# Patient Record
Sex: MALE | Race: OTHER | NOT HISPANIC OR LATINO | URBAN - METROPOLITAN AREA
[De-identification: names, ages, dates, MRNs, and addresses within clinical notes are randomized per-mention and may not be internally consistent; named-entity substitution may affect disease eponyms.]

---

## 2017-01-20 ENCOUNTER — INPATIENT (INPATIENT)
Facility: HOSPITAL | Age: 52
LOS: 2 days | Discharge: HOME | DRG: 816 | End: 2017-01-23
Attending: SURGERY | Admitting: SURGERY
Payer: MEDICARE

## 2017-01-20 VITALS
OXYGEN SATURATION: 98 % | TEMPERATURE: 98 F | SYSTOLIC BLOOD PRESSURE: 164 MMHG | RESPIRATION RATE: 20 BRPM | DIASTOLIC BLOOD PRESSURE: 98 MMHG | WEIGHT: 210.1 LBS | HEART RATE: 96 BPM

## 2017-01-20 LAB
ALBUMIN SERPL ELPH-MCNC: 3.5 G/DL — SIGNIFICANT CHANGE UP (ref 3.4–5)
ALP SERPL-CCNC: 127 U/L — HIGH (ref 40–120)
ALT FLD-CCNC: 35 U/L — SIGNIFICANT CHANGE UP (ref 12–42)
ANION GAP SERPL CALC-SCNC: 10 MMOL/L — SIGNIFICANT CHANGE UP (ref 9–16)
APPEARANCE UR: CLEAR — SIGNIFICANT CHANGE UP
AST SERPL-CCNC: 33 U/L — SIGNIFICANT CHANGE UP (ref 15–37)
BASOPHILS NFR BLD AUTO: 0.2 % — SIGNIFICANT CHANGE UP (ref 0–2)
BASOPHILS NFR BLD AUTO: 0.2 % — SIGNIFICANT CHANGE UP (ref 0–2)
BASOPHILS NFR BLD AUTO: 0.7 % — SIGNIFICANT CHANGE UP (ref 0–2)
BILIRUB SERPL-MCNC: 0.4 MG/DL — SIGNIFICANT CHANGE UP (ref 0.2–1.2)
BILIRUB UR-MCNC: NEGATIVE — SIGNIFICANT CHANGE UP
BLD GP AB SCN SERPL QL: NEGATIVE — SIGNIFICANT CHANGE UP
BUN SERPL-MCNC: 15 MG/DL — SIGNIFICANT CHANGE UP (ref 7–23)
CALCIUM SERPL-MCNC: 8.2 MG/DL — LOW (ref 8.5–10.5)
CHLORIDE SERPL-SCNC: 103 MMOL/L — SIGNIFICANT CHANGE UP (ref 96–108)
CK MB CFR SERPL CALC: <1 NG/ML — SIGNIFICANT CHANGE UP (ref 0.5–3.6)
CO2 SERPL-SCNC: 25 MMOL/L — SIGNIFICANT CHANGE UP (ref 22–31)
COLOR SPEC: YELLOW — SIGNIFICANT CHANGE UP
CREAT SERPL-MCNC: 0.64 MG/DL — SIGNIFICANT CHANGE UP (ref 0.5–1.3)
DIFF PNL FLD: NEGATIVE — SIGNIFICANT CHANGE UP
EOSINOPHIL NFR BLD AUTO: 0.1 % — SIGNIFICANT CHANGE UP (ref 0–6)
EOSINOPHIL NFR BLD AUTO: 0.3 % — SIGNIFICANT CHANGE UP (ref 0–6)
EOSINOPHIL NFR BLD AUTO: 2 % — SIGNIFICANT CHANGE UP (ref 0–6)
EXTRA BLUE TOP TUBE: SIGNIFICANT CHANGE UP
EXTRA SST TUBE: SIGNIFICANT CHANGE UP
GLUCOSE SERPL-MCNC: 179 MG/DL — HIGH (ref 70–99)
GLUCOSE UR QL: 100
HCT VFR BLD CALC: 35.2 % — LOW (ref 39–50)
HCT VFR BLD CALC: 35.7 % — LOW (ref 39–50)
HCT VFR BLD CALC: 37.3 % — LOW (ref 39–50)
HCT VFR BLD CALC: 40.7 % — SIGNIFICANT CHANGE UP (ref 39–50)
HETEROPH AB TITR SER AGGL: NEGATIVE — SIGNIFICANT CHANGE UP
HGB BLD-MCNC: 11.7 G/DL — LOW (ref 13–17)
HGB BLD-MCNC: 11.8 G/DL — LOW (ref 13–17)
HGB BLD-MCNC: 12.6 G/DL — LOW (ref 13–17)
HGB BLD-MCNC: 13.8 G/DL — SIGNIFICANT CHANGE UP (ref 13–17)
KETONES UR-MCNC: NEGATIVE — SIGNIFICANT CHANGE UP
LACTATE SERPL-SCNC: 2 MMOL/L — SIGNIFICANT CHANGE UP (ref 0.5–2)
LACTATE SERPL-SCNC: 2.1 MMOL/L — HIGH (ref 0.5–2)
LEUKOCYTE ESTERASE UR-ACNC: NEGATIVE — SIGNIFICANT CHANGE UP
LYMPHOCYTES # BLD AUTO: 16.9 % — SIGNIFICANT CHANGE UP (ref 13–44)
LYMPHOCYTES # BLD AUTO: 27.1 % — SIGNIFICANT CHANGE UP (ref 13–44)
LYMPHOCYTES # BLD AUTO: 8.1 % — LOW (ref 13–44)
MCHC RBC-ENTMCNC: 31.1 PG — SIGNIFICANT CHANGE UP (ref 27–34)
MCHC RBC-ENTMCNC: 31.2 PG — SIGNIFICANT CHANGE UP (ref 27–34)
MCHC RBC-ENTMCNC: 31.4 PG — SIGNIFICANT CHANGE UP (ref 27–34)
MCHC RBC-ENTMCNC: 31.6 PG — SIGNIFICANT CHANGE UP (ref 27–34)
MCHC RBC-ENTMCNC: 33.1 G/DL — SIGNIFICANT CHANGE UP (ref 32–36)
MCHC RBC-ENTMCNC: 33.2 G/DL — SIGNIFICANT CHANGE UP (ref 32–36)
MCHC RBC-ENTMCNC: 33.8 G/DL — SIGNIFICANT CHANGE UP (ref 32–36)
MCHC RBC-ENTMCNC: 33.9 G/DL — SIGNIFICANT CHANGE UP (ref 32–36)
MCV RBC AUTO: 92.5 FL — SIGNIFICANT CHANGE UP (ref 80–100)
MCV RBC AUTO: 93.5 FL — SIGNIFICANT CHANGE UP (ref 80–100)
MCV RBC AUTO: 93.9 FL — SIGNIFICANT CHANGE UP (ref 80–100)
MCV RBC AUTO: 94.2 FL — SIGNIFICANT CHANGE UP (ref 80–100)
MONOCYTES NFR BLD AUTO: 4.5 % — SIGNIFICANT CHANGE UP (ref 2–14)
MONOCYTES NFR BLD AUTO: 7 % — SIGNIFICANT CHANGE UP (ref 2–14)
MONOCYTES NFR BLD AUTO: 8.7 % — SIGNIFICANT CHANGE UP (ref 2–14)
NEUTROPHILS NFR BLD AUTO: 61.5 % — SIGNIFICANT CHANGE UP (ref 43–77)
NEUTROPHILS NFR BLD AUTO: 75.6 % — SIGNIFICANT CHANGE UP (ref 43–77)
NEUTROPHILS NFR BLD AUTO: 87.1 % — HIGH (ref 43–77)
NITRITE UR-MCNC: NEGATIVE — SIGNIFICANT CHANGE UP
PCP SPEC-MCNC: SIGNIFICANT CHANGE UP
PH UR: 5 — SIGNIFICANT CHANGE UP (ref 4–8)
PLATELET # BLD AUTO: 214 K/UL — SIGNIFICANT CHANGE UP (ref 150–400)
PLATELET # BLD AUTO: 220 K/UL — SIGNIFICANT CHANGE UP (ref 150–400)
PLATELET # BLD AUTO: 232 K/UL — SIGNIFICANT CHANGE UP (ref 150–400)
PLATELET # BLD AUTO: 280 K/UL — SIGNIFICANT CHANGE UP (ref 150–400)
POTASSIUM SERPL-MCNC: 4.1 MMOL/L — SIGNIFICANT CHANGE UP (ref 3.5–5.3)
POTASSIUM SERPL-SCNC: 4.1 MMOL/L — SIGNIFICANT CHANGE UP (ref 3.5–5.3)
PROT SERPL-MCNC: 7.4 G/DL — SIGNIFICANT CHANGE UP (ref 6.4–8.2)
PROT UR-MCNC: NEGATIVE MG/DL — SIGNIFICANT CHANGE UP
RBC # BLD: 3.75 M/UL — LOW (ref 4.2–5.8)
RBC # BLD: 3.79 M/UL — LOW (ref 4.2–5.8)
RBC # BLD: 3.99 M/UL — LOW (ref 4.2–5.8)
RBC # BLD: 4.4 M/UL — SIGNIFICANT CHANGE UP (ref 4.2–5.8)
RBC # FLD: 13.1 % — SIGNIFICANT CHANGE UP (ref 10.3–16.9)
RBC # FLD: 13.3 % — SIGNIFICANT CHANGE UP (ref 10.3–16.9)
RBC # FLD: 13.4 % — SIGNIFICANT CHANGE UP (ref 10.3–16.9)
RBC # FLD: 13.4 % — SIGNIFICANT CHANGE UP (ref 10.3–16.9)
RH IG SCN BLD-IMP: POSITIVE — SIGNIFICANT CHANGE UP
RH IG SCN BLD-IMP: POSITIVE — SIGNIFICANT CHANGE UP
SODIUM SERPL-SCNC: 138 MMOL/L — SIGNIFICANT CHANGE UP (ref 135–145)
SP GR SPEC: 1.02 — SIGNIFICANT CHANGE UP (ref 1–1.03)
TROPONIN I SERPL-MCNC: <0.015 NG/ML — SIGNIFICANT CHANGE UP (ref 0.01–0.04)
UROBILINOGEN FLD QL: 0.2 E.U./DL — SIGNIFICANT CHANGE UP
WBC # BLD: 13.5 K/UL — HIGH (ref 3.8–10.5)
WBC # BLD: 13.8 K/UL — HIGH (ref 3.8–10.5)
WBC # BLD: 17.2 K/UL — HIGH (ref 3.8–10.5)
WBC # BLD: 18.8 K/UL — HIGH (ref 3.8–10.5)
WBC # FLD AUTO: 13.5 K/UL — HIGH (ref 3.8–10.5)
WBC # FLD AUTO: 13.8 K/UL — HIGH (ref 3.8–10.5)
WBC # FLD AUTO: 17.2 K/UL — HIGH (ref 3.8–10.5)
WBC # FLD AUTO: 18.8 K/UL — HIGH (ref 3.8–10.5)

## 2017-01-20 PROCEDURE — 93010 ELECTROCARDIOGRAM REPORT: CPT

## 2017-01-20 PROCEDURE — 71020: CPT | Mod: 26

## 2017-01-20 PROCEDURE — 71020: CPT | Mod: 26,77

## 2017-01-20 PROCEDURE — 99285 EMERGENCY DEPT VISIT HI MDM: CPT | Mod: 25

## 2017-01-20 PROCEDURE — 74177 CT ABD & PELVIS W/CONTRAST: CPT | Mod: 26

## 2017-01-20 RX ORDER — NITROGLYCERIN 6.5 MG
0.4 CAPSULE, EXTENDED RELEASE ORAL ONCE
Qty: 0 | Refills: 0 | Status: DISCONTINUED | OUTPATIENT
Start: 2017-01-20 | End: 2017-01-20

## 2017-01-20 RX ORDER — LISINOPRIL 2.5 MG/1
20 TABLET ORAL DAILY
Qty: 0 | Refills: 0 | Status: DISCONTINUED | OUTPATIENT
Start: 2017-01-21 | End: 2017-01-21

## 2017-01-20 RX ORDER — HYDROMORPHONE HYDROCHLORIDE 2 MG/ML
4 INJECTION INTRAMUSCULAR; INTRAVENOUS; SUBCUTANEOUS EVERY 4 HOURS
Qty: 0 | Refills: 0 | Status: DISCONTINUED | OUTPATIENT
Start: 2017-01-20 | End: 2017-01-20

## 2017-01-20 RX ORDER — MORPHINE SULFATE 50 MG/1
4 CAPSULE, EXTENDED RELEASE ORAL ONCE
Qty: 0 | Refills: 0 | Status: DISCONTINUED | OUTPATIENT
Start: 2017-01-20 | End: 2017-01-20

## 2017-01-20 RX ORDER — HYDROMORPHONE HYDROCHLORIDE 2 MG/ML
1 INJECTION INTRAMUSCULAR; INTRAVENOUS; SUBCUTANEOUS ONCE
Qty: 0 | Refills: 0 | Status: DISCONTINUED | OUTPATIENT
Start: 2017-01-20 | End: 2017-01-20

## 2017-01-20 RX ORDER — TIOTROPIUM BROMIDE 18 UG/1
1 CAPSULE ORAL; RESPIRATORY (INHALATION) ONCE
Qty: 0 | Refills: 0 | Status: COMPLETED | OUTPATIENT
Start: 2017-01-20 | End: 2017-01-20

## 2017-01-20 RX ORDER — DILTIAZEM HCL 120 MG
120 CAPSULE, EXT RELEASE 24 HR ORAL ONCE
Qty: 0 | Refills: 0 | Status: COMPLETED | OUTPATIENT
Start: 2017-01-20 | End: 2017-01-20

## 2017-01-20 RX ORDER — ALBUTEROL 90 UG/1
2 AEROSOL, METERED ORAL EVERY 4 HOURS
Qty: 0 | Refills: 0 | Status: DISCONTINUED | OUTPATIENT
Start: 2017-01-20 | End: 2017-01-23

## 2017-01-20 RX ORDER — SODIUM CHLORIDE 9 MG/ML
1000 INJECTION, SOLUTION INTRAVENOUS
Qty: 0 | Refills: 0 | Status: DISCONTINUED | OUTPATIENT
Start: 2017-01-20 | End: 2017-01-21

## 2017-01-20 RX ORDER — ONDANSETRON 8 MG/1
4 TABLET, FILM COATED ORAL EVERY 6 HOURS
Qty: 0 | Refills: 0 | Status: DISCONTINUED | OUTPATIENT
Start: 2017-01-20 | End: 2017-01-23

## 2017-01-20 RX ORDER — HYDROMORPHONE HYDROCHLORIDE 2 MG/ML
1 INJECTION INTRAMUSCULAR; INTRAVENOUS; SUBCUTANEOUS EVERY 4 HOURS
Qty: 0 | Refills: 0 | Status: DISCONTINUED | OUTPATIENT
Start: 2017-01-20 | End: 2017-01-20

## 2017-01-20 RX ORDER — IOHEXOL 300 MG/ML
50 INJECTION, SOLUTION INTRAVENOUS ONCE
Qty: 0 | Refills: 0 | Status: COMPLETED | OUTPATIENT
Start: 2017-01-20 | End: 2017-01-20

## 2017-01-20 RX ORDER — DILTIAZEM HCL 120 MG
120 CAPSULE, EXT RELEASE 24 HR ORAL DAILY
Qty: 0 | Refills: 0 | Status: DISCONTINUED | OUTPATIENT
Start: 2017-01-21 | End: 2017-01-23

## 2017-01-20 RX ORDER — SODIUM CHLORIDE 9 MG/ML
1000 INJECTION INTRAMUSCULAR; INTRAVENOUS; SUBCUTANEOUS ONCE
Qty: 0 | Refills: 0 | Status: COMPLETED | OUTPATIENT
Start: 2017-01-20 | End: 2017-01-20

## 2017-01-20 RX ORDER — SODIUM CHLORIDE 9 MG/ML
1500 INJECTION INTRAMUSCULAR; INTRAVENOUS; SUBCUTANEOUS ONCE
Qty: 0 | Refills: 0 | Status: COMPLETED | OUTPATIENT
Start: 2017-01-20 | End: 2017-01-20

## 2017-01-20 RX ORDER — MORPHINE SULFATE 50 MG/1
4 CAPSULE, EXTENDED RELEASE ORAL EVERY 4 HOURS
Qty: 0 | Refills: 0 | Status: DISCONTINUED | OUTPATIENT
Start: 2017-01-20 | End: 2017-01-21

## 2017-01-20 RX ORDER — LISINOPRIL 2.5 MG/1
20 TABLET ORAL ONCE
Qty: 0 | Refills: 0 | Status: COMPLETED | OUTPATIENT
Start: 2017-01-20 | End: 2017-01-20

## 2017-01-20 RX ORDER — ONDANSETRON 8 MG/1
4 TABLET, FILM COATED ORAL ONCE
Qty: 0 | Refills: 0 | Status: COMPLETED | OUTPATIENT
Start: 2017-01-20 | End: 2017-01-20

## 2017-01-20 RX ORDER — HYDROMORPHONE HYDROCHLORIDE 2 MG/ML
0.5 INJECTION INTRAMUSCULAR; INTRAVENOUS; SUBCUTANEOUS EVERY 4 HOURS
Qty: 0 | Refills: 0 | Status: DISCONTINUED | OUTPATIENT
Start: 2017-01-20 | End: 2017-01-20

## 2017-01-20 RX ORDER — PANTOPRAZOLE SODIUM 20 MG/1
40 TABLET, DELAYED RELEASE ORAL
Qty: 0 | Refills: 0 | Status: DISCONTINUED | OUTPATIENT
Start: 2017-01-20 | End: 2017-01-23

## 2017-01-20 RX ORDER — MORPHINE SULFATE 50 MG/1
2 CAPSULE, EXTENDED RELEASE ORAL EVERY 4 HOURS
Qty: 0 | Refills: 0 | Status: DISCONTINUED | OUTPATIENT
Start: 2017-01-20 | End: 2017-01-21

## 2017-01-20 RX ORDER — SIMVASTATIN 20 MG/1
40 TABLET, FILM COATED ORAL AT BEDTIME
Qty: 0 | Refills: 0 | Status: DISCONTINUED | OUTPATIENT
Start: 2017-01-20 | End: 2017-01-23

## 2017-01-20 RX ORDER — LISINOPRIL 2.5 MG/1
20 TABLET ORAL DAILY
Qty: 0 | Refills: 0 | Status: DISCONTINUED | OUTPATIENT
Start: 2017-01-20 | End: 2017-01-20

## 2017-01-20 RX ADMIN — MORPHINE SULFATE 2 MILLIGRAM(S): 50 CAPSULE, EXTENDED RELEASE ORAL at 21:12

## 2017-01-20 RX ADMIN — MORPHINE SULFATE 4 MILLIGRAM(S): 50 CAPSULE, EXTENDED RELEASE ORAL at 04:00

## 2017-01-20 RX ADMIN — SODIUM CHLORIDE 1000 MILLILITER(S): 9 INJECTION INTRAMUSCULAR; INTRAVENOUS; SUBCUTANEOUS at 06:46

## 2017-01-20 RX ADMIN — MORPHINE SULFATE 4 MILLIGRAM(S): 50 CAPSULE, EXTENDED RELEASE ORAL at 18:35

## 2017-01-20 RX ADMIN — SODIUM CHLORIDE 1333.33 MILLILITER(S): 9 INJECTION INTRAMUSCULAR; INTRAVENOUS; SUBCUTANEOUS at 04:00

## 2017-01-20 RX ADMIN — SIMVASTATIN 40 MILLIGRAM(S): 20 TABLET, FILM COATED ORAL at 22:02

## 2017-01-20 RX ADMIN — SODIUM CHLORIDE 125 MILLILITER(S): 9 INJECTION, SOLUTION INTRAVENOUS at 21:12

## 2017-01-20 RX ADMIN — LISINOPRIL 20 MILLIGRAM(S): 2.5 TABLET ORAL at 07:25

## 2017-01-20 RX ADMIN — HYDROMORPHONE HYDROCHLORIDE 1 MILLIGRAM(S): 2 INJECTION INTRAMUSCULAR; INTRAVENOUS; SUBCUTANEOUS at 07:10

## 2017-01-20 RX ADMIN — SODIUM CHLORIDE 125 MILLILITER(S): 9 INJECTION, SOLUTION INTRAVENOUS at 11:12

## 2017-01-20 RX ADMIN — HYDROMORPHONE HYDROCHLORIDE 1 MILLIGRAM(S): 2 INJECTION INTRAMUSCULAR; INTRAVENOUS; SUBCUTANEOUS at 09:20

## 2017-01-20 RX ADMIN — MORPHINE SULFATE 2 MILLIGRAM(S): 50 CAPSULE, EXTENDED RELEASE ORAL at 17:17

## 2017-01-20 RX ADMIN — ONDANSETRON 4 MILLIGRAM(S): 8 TABLET, FILM COATED ORAL at 04:00

## 2017-01-20 RX ADMIN — MORPHINE SULFATE 4 MILLIGRAM(S): 50 CAPSULE, EXTENDED RELEASE ORAL at 22:56

## 2017-01-20 RX ADMIN — MORPHINE SULFATE 2 MILLIGRAM(S): 50 CAPSULE, EXTENDED RELEASE ORAL at 21:30

## 2017-01-20 RX ADMIN — MORPHINE SULFATE 4 MILLIGRAM(S): 50 CAPSULE, EXTENDED RELEASE ORAL at 04:34

## 2017-01-20 RX ADMIN — SODIUM CHLORIDE 1500 MILLILITER(S): 9 INJECTION INTRAMUSCULAR; INTRAVENOUS; SUBCUTANEOUS at 04:25

## 2017-01-20 RX ADMIN — HYDROMORPHONE HYDROCHLORIDE 1 MILLIGRAM(S): 2 INJECTION INTRAMUSCULAR; INTRAVENOUS; SUBCUTANEOUS at 06:54

## 2017-01-20 RX ADMIN — MORPHINE SULFATE 4 MILLIGRAM(S): 50 CAPSULE, EXTENDED RELEASE ORAL at 04:43

## 2017-01-20 RX ADMIN — HYDROMORPHONE HYDROCHLORIDE 1 MILLIGRAM(S): 2 INJECTION INTRAMUSCULAR; INTRAVENOUS; SUBCUTANEOUS at 11:27

## 2017-01-20 RX ADMIN — HYDROMORPHONE HYDROCHLORIDE 1 MILLIGRAM(S): 2 INJECTION INTRAMUSCULAR; INTRAVENOUS; SUBCUTANEOUS at 11:09

## 2017-01-20 RX ADMIN — HYDROMORPHONE HYDROCHLORIDE 1 MILLIGRAM(S): 2 INJECTION INTRAMUSCULAR; INTRAVENOUS; SUBCUTANEOUS at 08:48

## 2017-01-20 RX ADMIN — MORPHINE SULFATE 4 MILLIGRAM(S): 50 CAPSULE, EXTENDED RELEASE ORAL at 06:00

## 2017-01-20 RX ADMIN — MORPHINE SULFATE 4 MILLIGRAM(S): 50 CAPSULE, EXTENDED RELEASE ORAL at 18:19

## 2017-01-20 RX ADMIN — Medication 120 MILLIGRAM(S): at 07:25

## 2017-01-20 RX ADMIN — MORPHINE SULFATE 2 MILLIGRAM(S): 50 CAPSULE, EXTENDED RELEASE ORAL at 16:51

## 2017-01-20 RX ADMIN — MORPHINE SULFATE 4 MILLIGRAM(S): 50 CAPSULE, EXTENDED RELEASE ORAL at 05:32

## 2017-01-20 RX ADMIN — IOHEXOL 50 MILLILITER(S): 300 INJECTION, SOLUTION INTRAVENOUS at 04:02

## 2017-01-20 RX ADMIN — MORPHINE SULFATE 4 MILLIGRAM(S): 50 CAPSULE, EXTENDED RELEASE ORAL at 05:26

## 2017-01-20 NOTE — ED PROVIDER NOTE - MEDICAL DECISION MAKING DETAILS
IV meds + pain amelioration , hydration,  CT and abdominal set care + EKG completed labs completed no emergent surgical process identified IV meds + pain amelioration , hydration,  CT and abdominal set care + EKG completed labs with appreciated splenic subcapsular hematoma ? laceration thus surgical team consulted

## 2017-01-20 NOTE — H&P ADULT. - HISTORY OF PRESENT ILLNESS
51M w/ PMH of CAD, MI s/p angioplasty 10 years ago now on ASA 81, HTN, and pancreatitis 2/2 to EtOH abuse presents to [facility] with 2 day history of LUQ abdominal pain which acutely worsened at 2 AM on 1/20/17. Patient reports he was urinating and "felt a pop" and had severe Left sided abdominal pain, worse with inspiration and with associated nausea but no vomiting. Patient became sweaty and felt hypertensive so called an ambulance. Patient reports that he had a dull left sided abdominal pain for the past 2 days, but became acutely worse so decided he needed to seek further medical attention. In the ED, patient was stable, but moderately hypertensive (170/110) and mildly tachycardic (105-110). A CT scan was done which showed a subcapsular splenic hypodensity measuring 1.5 cm concerning for splenic hematoma. Surgery was consulted for further recommendations.     PMH: HTN, CAD/MI, pancreatitis 2/2 EtOH Abuse   PSH: Angioplasty (2006)   Allergies: NKDA   Meds: As listed in rec   SH: Reports drinking 6 beers every other day, denies liquor use. Reports occasional marijuana use. Denies other drug use. 1ppd/40 yrs smoking hx.     Physical Exam:   General: NAD  CV: Normal sinus tachycardia  Resp: Non-labored breathing, no acute respiratory distress, CTAB  Abd: Soft, Mildly distended, voluntary guarding, +TTP in LUQ, spleen palpable on exam   Ext: WNL

## 2017-01-20 NOTE — ED PROVIDER NOTE - PMH
Angina pectoris  Anginal pain  Atherosclerosis of coronary artery  CAD (coronary artery disease)  Essential hypertension  HTN (hypertension)  Nondependent alcohol abuse  Alcohol abuse  Pancreatitis

## 2017-01-20 NOTE — ED PROVIDER NOTE - CARE PLAN
Principal Discharge DX:	Left upper quadrant pain Principal Discharge DX:	Left upper quadrant pain  Secondary Diagnosis:	Splenic disorder

## 2017-01-20 NOTE — H&P ADULT. - ASSESSMENT
51M w/ PMH of MI, CAD, HTN, EtOH abuse presents w/ 2 day history of LUQ acutely worsening found to have splenic hematoma.   - Admit to general surgery Team 4, telemetry monitoring under Dr. Dowd   - NPO/IVF  - Pain, Nausea control PRN   - Serial abdominal exams   - DVT PPX: SCDs, NO SQH   - HOLD ASA 81   - HTN control

## 2017-01-20 NOTE — ED PROVIDER NOTE - ATTENDING CONTRIBUTION TO CARE
51M hx pancreatitis, htn, cad, c/o LUQ abd pain. pt states awoke with pain this morning.  +nausea. no vomiting, no fevers, no trauma. no dysuria. no chest pain, no SOB.  gen- nad  heent- ncat, clear conj  cv -rrr  lungs -ctab  abd - soft, nt, nd  ext -wwp, no edema  neuro -aox3, steady gait, escalante  elevation in WBC, no elevation in lipase,  CT shows subcapsular splenic hematoma, surgery consulted. pt continues to deny trauma, no ecchymosis on exam

## 2017-01-20 NOTE — ED PROVIDER NOTE - OBJECTIVE STATEMENT
severe left sided abdominal pain tonight and thus to Ed + associated nausea no fever no diarrhea severe left sided abdominal acute onset  pain tonight and thus to Ed + associated nausea no fever no diarrhea no trauma reported + NKDA + Hx MI ~ 2007 remote Hx etoh overuse but states drinks less now ( beer only and no " shakes " if not drinking )  HTN HLD GERD + Cardizem, lisinopril ,  Nexium Statin and baby asa daily + smoker ( marijuana and cigarettes ) denies other drug use Admits to being in a drug survey with VIT D use Denies surgeries sans Angioplasty in past

## 2017-01-20 NOTE — H&P ADULT. - FAMILY HISTORY
Father  Still living? Unknown  Family history of mental disorder, Age at diagnosis: Age Unknown     Mother  Still living? Unknown  Family history of Hodgkin's lymphoma, Age at diagnosis: Age Unknown     Sibling  Still living? Unknown  Family history of diabetes mellitus, Age at diagnosis: Age Unknown

## 2017-01-20 NOTE — ED ADULT NURSE NOTE - OBJECTIVE STATEMENT
52yo M, A&Ox3, came into ER c/o sudden abdominal pain to luq, states was peeing and had "pop" Pt vocal in regards to pain, moaning and hunched over grabbing abdomen. No notable masses. No cp, no sob, no back pain. Pt c/o some nausea, no vomiting. Some mild tenderness to left flank. No guarding noted. +bowl sounds. states pain 10/10, sharp. denies urinary s/s. Lungs clear bilateral. No jvd, no edema. No numbness, no tingling. Will continue to monitor. 50yo M, A&Ox3, came into ER c/o sudden abdominal pain to luq, states was peeing and had "pop" Pt vocal in regards to pain, moaning and hunched over grabbing abdomen. No notable masses. No cp, no sob, no back pain. Pt c/o some nausea, no vomiting. Some tenderness to left side of abdomen No guarding noted. +bowl sounds. states pain 10/10, sharp. denies urinary s/s, denies trauma. Lungs clear bilateral. No jvd, no edema. No numbness, no tingling. Will continue to monitor. 50yo M, A&Ox3, came into ER c/o sudden abdominal pain to luq, states was peeing and had "pop" Pt vocal in regards to pain, moaning and hunched over grabbing abdomen. No notable masses. No cp, no sob, no back pain. Pt c/o some nausea, no vomiting. Some tenderness to left side of abdomen No guarding noted. +bowl sounds. states pain 10/10, sharp. denies urinary s/s, denies trauma.  and cullens sign. Lungs clear bilateral. No jvd, no edema. No numbness, no tingling. Will continue to monitor.

## 2017-01-20 NOTE — ED PROVIDER NOTE - DIAGNOSTIC INTERPRETATION
ER Physician:  Dinorah Director  CHEST XRAY INTERPRETATION: lungs clear, heart shadow normal, bony structures intact

## 2017-01-20 NOTE — ED PROVIDER NOTE - PROGRESS NOTE DETAILS
Pt received from night team. Pt reports improved pain s/p dilaudid.  BP elevated - pt has not taken bp meds.  Surg at bedside, await their eval and recommendations.  Repeat cbc pending. seen by surgery will admit to tele

## 2017-01-21 LAB
ANION GAP SERPL CALC-SCNC: 8 MMOL/L — LOW (ref 9–16)
BUN SERPL-MCNC: 8 MG/DL — SIGNIFICANT CHANGE UP (ref 7–23)
CALCIUM SERPL-MCNC: 8.8 MG/DL — SIGNIFICANT CHANGE UP (ref 8.5–10.5)
CHLORIDE SERPL-SCNC: 99 MMOL/L — SIGNIFICANT CHANGE UP (ref 96–108)
CO2 SERPL-SCNC: 27 MMOL/L — SIGNIFICANT CHANGE UP (ref 22–31)
CREAT SERPL-MCNC: 0.6 MG/DL — SIGNIFICANT CHANGE UP (ref 0.5–1.3)
GLUCOSE SERPL-MCNC: 130 MG/DL — HIGH (ref 70–99)
HCT VFR BLD CALC: 36.3 % — LOW (ref 39–50)
HGB BLD-MCNC: 11.9 G/DL — LOW (ref 13–17)
MAGNESIUM SERPL-MCNC: 2 MG/DL — SIGNIFICANT CHANGE UP (ref 1.6–2.4)
MCHC RBC-ENTMCNC: 30.9 PG — SIGNIFICANT CHANGE UP (ref 27–34)
MCHC RBC-ENTMCNC: 32.8 G/DL — SIGNIFICANT CHANGE UP (ref 32–36)
MCV RBC AUTO: 94.3 FL — SIGNIFICANT CHANGE UP (ref 80–100)
PHOSPHATE SERPL-MCNC: 2.7 MG/DL — SIGNIFICANT CHANGE UP (ref 2.5–4.5)
PLATELET # BLD AUTO: 204 K/UL — SIGNIFICANT CHANGE UP (ref 150–400)
POTASSIUM SERPL-MCNC: 3.9 MMOL/L — SIGNIFICANT CHANGE UP (ref 3.5–5.3)
POTASSIUM SERPL-SCNC: 3.9 MMOL/L — SIGNIFICANT CHANGE UP (ref 3.5–5.3)
RBC # BLD: 3.85 M/UL — LOW (ref 4.2–5.8)
RBC # FLD: 13.4 % — SIGNIFICANT CHANGE UP (ref 10.3–16.9)
SODIUM SERPL-SCNC: 134 MMOL/L — LOW (ref 135–145)
WBC # BLD: 11.5 K/UL — HIGH (ref 3.8–10.5)
WBC # FLD AUTO: 11.5 K/UL — HIGH (ref 3.8–10.5)

## 2017-01-21 RX ORDER — POTASSIUM PHOSPHATE, MONOBASIC POTASSIUM PHOSPHATE, DIBASIC 236; 224 MG/ML; MG/ML
15 INJECTION, SOLUTION INTRAVENOUS ONCE
Qty: 0 | Refills: 0 | Status: COMPLETED | OUTPATIENT
Start: 2017-01-21 | End: 2017-01-21

## 2017-01-21 RX ORDER — LISINOPRIL 2.5 MG/1
20 TABLET ORAL ONCE
Qty: 0 | Refills: 0 | Status: COMPLETED | OUTPATIENT
Start: 2017-01-21 | End: 2017-01-21

## 2017-01-21 RX ORDER — HYDROMORPHONE HYDROCHLORIDE 2 MG/ML
0.5 INJECTION INTRAMUSCULAR; INTRAVENOUS; SUBCUTANEOUS EVERY 4 HOURS
Qty: 0 | Refills: 0 | Status: DISCONTINUED | OUTPATIENT
Start: 2017-01-21 | End: 2017-01-23

## 2017-01-21 RX ORDER — LABETALOL HCL 100 MG
10 TABLET ORAL ONCE
Qty: 0 | Refills: 0 | Status: COMPLETED | OUTPATIENT
Start: 2017-01-21 | End: 2017-01-21

## 2017-01-21 RX ORDER — SODIUM CHLORIDE 9 MG/ML
1000 INJECTION, SOLUTION INTRAVENOUS
Qty: 0 | Refills: 0 | Status: DISCONTINUED | OUTPATIENT
Start: 2017-01-21 | End: 2017-01-23

## 2017-01-21 RX ORDER — LISINOPRIL 2.5 MG/1
40 TABLET ORAL DAILY
Qty: 0 | Refills: 0 | Status: DISCONTINUED | OUTPATIENT
Start: 2017-01-22 | End: 2017-01-23

## 2017-01-21 RX ADMIN — ALBUTEROL 2 PUFF(S): 90 AEROSOL, METERED ORAL at 23:42

## 2017-01-21 RX ADMIN — LISINOPRIL 20 MILLIGRAM(S): 2.5 TABLET ORAL at 14:05

## 2017-01-21 RX ADMIN — HYDROMORPHONE HYDROCHLORIDE 0.5 MILLIGRAM(S): 2 INJECTION INTRAMUSCULAR; INTRAVENOUS; SUBCUTANEOUS at 14:05

## 2017-01-21 RX ADMIN — MORPHINE SULFATE 2 MILLIGRAM(S): 50 CAPSULE, EXTENDED RELEASE ORAL at 06:21

## 2017-01-21 RX ADMIN — MORPHINE SULFATE 4 MILLIGRAM(S): 50 CAPSULE, EXTENDED RELEASE ORAL at 04:18

## 2017-01-21 RX ADMIN — Medication 120 MILLIGRAM(S): at 06:11

## 2017-01-21 RX ADMIN — HYDROMORPHONE HYDROCHLORIDE 0.5 MILLIGRAM(S): 2 INJECTION INTRAMUSCULAR; INTRAVENOUS; SUBCUTANEOUS at 23:18

## 2017-01-21 RX ADMIN — POTASSIUM PHOSPHATE, MONOBASIC POTASSIUM PHOSPHATE, DIBASIC 63.75 MILLIMOLE(S): 236; 224 INJECTION, SOLUTION INTRAVENOUS at 10:04

## 2017-01-21 RX ADMIN — HYDROMORPHONE HYDROCHLORIDE 0.5 MILLIGRAM(S): 2 INJECTION INTRAMUSCULAR; INTRAVENOUS; SUBCUTANEOUS at 22:25

## 2017-01-21 RX ADMIN — HYDROMORPHONE HYDROCHLORIDE 0.5 MILLIGRAM(S): 2 INJECTION INTRAMUSCULAR; INTRAVENOUS; SUBCUTANEOUS at 18:17

## 2017-01-21 RX ADMIN — MORPHINE SULFATE 4 MILLIGRAM(S): 50 CAPSULE, EXTENDED RELEASE ORAL at 04:30

## 2017-01-21 RX ADMIN — HYDROMORPHONE HYDROCHLORIDE 0.5 MILLIGRAM(S): 2 INJECTION INTRAMUSCULAR; INTRAVENOUS; SUBCUTANEOUS at 09:50

## 2017-01-21 RX ADMIN — SIMVASTATIN 40 MILLIGRAM(S): 20 TABLET, FILM COATED ORAL at 22:14

## 2017-01-21 RX ADMIN — Medication 10 MILLIGRAM(S): at 11:01

## 2017-01-21 RX ADMIN — LISINOPRIL 20 MILLIGRAM(S): 2.5 TABLET ORAL at 06:11

## 2017-01-21 RX ADMIN — MORPHINE SULFATE 2 MILLIGRAM(S): 50 CAPSULE, EXTENDED RELEASE ORAL at 06:30

## 2017-01-21 RX ADMIN — MORPHINE SULFATE 4 MILLIGRAM(S): 50 CAPSULE, EXTENDED RELEASE ORAL at 00:30

## 2017-01-21 NOTE — PROGRESS NOTE ADULT - SUBJECTIVE AND OBJECTIVE BOX
O/N: 8pm CBC stable. VSS wnl  1/20: admitted from ED w/ splenic hematoma; tachycardia improved; abd exam- soft, non distended, minimal pain to LUQ; [id number]; O/N: 8pm CBC stable. VSS wnl  : admitted from ED w/ splenic hematoma; tachycardia improved; abd exam- soft, non distended, minimal pain to LUQ; [id number];     SUBJECTIVE:  Flatus: [ x] YES [ ] NO             Bowel Movement: [x ] YES [ ] NO  Pain Control Adequate: [ x] YES [ ] NO  Nausea: [ ] YES [x ] NO            Vomiting: [ ] YES [x ] NO  Diarrhea: [ ] YES [x ] NO         Constipation: [ ] YES [x ] NO     Chest Pain: [ ] YES [x ] NO    SOB:  [ ] YES [x ] NO    MEDICATIONS  (STANDING):  simvastatin 40milliGRAM(s) Oral at bedtime  pantoprazole    Tablet 40milliGRAM(s) Oral before breakfast  lisinopril 20milliGRAM(s) Oral daily  diltiazem   CD 120milliGRAM(s) Oral daily  lactated ringers. 1000milliLiter(s) IV Continuous <Continuous>  potassium phosphate IVPB 15milliMole(s) IV Intermittent once    MEDICATIONS  (PRN):  ALBUTerol    90 MICROgram(s) HFA Inhaler 2Puff(s) Inhalation every 4 hours PRN Shortness of Breath and/or Wheezing  ondansetron Injectable 4milliGRAM(s) IV Push every 6 hours PRN Nausea  HYDROmorphone  Injectable 0.5milliGRAM(s) IV Push every 4 hours PRN Severe Pain (7 - 10)      Vital Signs Last 24 Hrs  T(C): 37.1, Max: 37.1 ( @ 04:43)  T(F): 98.8, Max: 98.8 ( @ 04:43)  HR: 90 (90 - 102)  BP: 162/97 (133/90 - 162/97)  BP(mean): 123 (106 - 123)  RR: 16 (16 - 18)  SpO2: 94% (93% - 97%)    PHYSICAL EXAM:      Constitutional: NAD, A&Ox3    Gastrointestinal: Soft, NT/ND, No guarding or rebound    Extremities: no peripheral edema        I&O's Detail      LABS:                        11.9   11.5  )-----------( 204      ( 2017 07:46 )             36.3     2017 07:46    134    |  99     |  8      ----------------------------<  130    3.9     |  27     |  0.60     Ca    8.8        2017 07:46  Phos  2.7       2017 07:46  Mg     2.0       2017 07:46    TPro  7.4    /  Alb  3.5    /  TBili  0.4    /  DBili  x      /  AST  33     /  ALT  35     /  AlkPhos  127    2017 04:07    PT/INR - ( 2017 04:07 )   PT: 11.7 sec;   INR: 1.05          PTT - ( 2017 04:07 )  PTT:27.3 sec  Urinalysis Basic - ( 2017 05:50 )    Color: Yellow / Appearance: Clear / S.025 / pH: x  Gluc: x / Ketone: NEGATIVE  / Bili: NEGATIVE / Urobili: 0.2 E.U./dL   Blood: x / Protein: NEGATIVE mg/dL / Nitrite: NEGATIVE   Leuk Esterase: NEGATIVE / RBC: x / WBC x   Sq Epi: x / Non Sq Epi: x / Bacteria: x          RADIOLOGY & ADDITIONAL STUDIES:

## 2017-01-21 NOTE — CONSULT NOTE ADULT - ASSESSMENT
52 yo M with HTN, CAD s/p angioplasty, Possible AFib, and h/o pancreatitis 2/2 EtOH Abuse who presents with a splenic hematoma, now with asymptomatic uncontrolled hypertension. Elevated BP is likely exacerbated by both fluids and pain. He is asymptomatic, though the surgical team would like him better controlled to mitigate worsening of his splenic hematoma.     - Improve pain control and reduce or stop IVF as soon as feasible  - IV labetalol for acute mangement is a good choice  - IF still uncontrolled, can give an extra lisinopril 20mg today and increase his daily dose to 40mg  - Will need cardiology follow up as an outpatient, ideally with Dr. Gallardo  - Discussed with Dr. Jose Nunez MD  Cardiology Fellow

## 2017-01-21 NOTE — PROGRESS NOTE ADULT - SUBJECTIVE AND OBJECTIVE BOX
Pt with spont spleen laceration ( grade 1), uncontrolled hypertension. Feels ok, some RUQ pain. Cont monitoring, cardiology consult

## 2017-01-21 NOTE — PROVIDER CONTACT NOTE (CHANGE IN STATUS NOTIFICATION) - BACKGROUND
noted that patient chews nicotine gum, rubi oscar aware, pt states he cant take nicotine patches as it elevates his bp, dr. jarrett also aware, that pt may chew his own gum, as approved by rubi oscar

## 2017-01-21 NOTE — PROGRESS NOTE ADULT - ASSESSMENT
51M w/ HTN, CAD/MI s/p angioplasty, EtOH abuse with atraumatic Grade II splenic hematoma.     NPO/IVF   Pain/Nausea Control PRN  Serial Abdominal Exams   Hold SQH/ASA   Monitor for Tachycardia/increased pain   HTN control - cont diltiazem/lisinopril

## 2017-01-21 NOTE — CONSULT NOTE ADULT - ATTENDING COMMENTS
Patient seen and examined.  History reviewed. 51 M w of CAD/MI s/p angioplasty in 2006, HTN, possible  AFib, h/o  pancreatitis 2/2 to EtOH abuse admitted with abdominal pain and finding of  splenic hematoma. BPs improved on current regimen.  Discussed with patient importance of compliance and follow-up.

## 2017-01-21 NOTE — CONSULT NOTE ADULT - SUBJECTIVE AND OBJECTIVE BOX
HPI:  51 M w of CAD, MI s/p angioplasty in 2006 at [facility], HTN, Possible AFib (says he gets a "racing heart", is on diltiazem) and pancreatitis 2/2 to EtOH abuse presented with acute onset abdominal pain, found to have a splenic hematoma for which he is not being managed concervatively with fluids and pain control. Today his BP was elevated to 174/110 for which he was given labetalol 10mg IV with good response to 143/85. He is asymptomatic, denies headache, lightheadedness, dizziness, chest pain or shortness of breath.     He was seen by Dr. Gallardo for an outpatient stress test one year ago but has not followed up since that time.     ROS: A 10-point review of systems was otherwise negative.      PMH: HTN, CAD s/p angioplasty, Possible AFib, pancreatitis 2/2 EtOH Abuse     PSH: Angioplasty (2006)     SOCIAL HISTORY:  40 pack year smoker. Drinks 1-2 beer daily. Occasional marijuana use. Prior cocaine abuse, quit in 2006.    FAMILY HISTORY:  Family history of diabetes mellitus (Sibling)  Family history of Hodgkin&#x27;s lymphoma (Mother)  Family history of mental disorder (Father): Family history of alcoholism    ALLERGIES: 	  NKDA    Home Meds: ASA 81, Diltiazem  daily, lisinopril 20 daily, Nitro SL prn    MEDICATIONS:  simvastatin 40milliGRAM(s) Oral at bedtime  ALBUTerol    90 MICROgram(s) HFA Inhaler 2Puff(s) Inhalation every 4 hours PRN  pantoprazole    Tablet 40milliGRAM(s) Oral before breakfast  diltiazem   CD 120milliGRAM(s) Oral daily  ondansetron Injectable 4milliGRAM(s) IV Push every 6 hours PRN  lactated ringers. 1000milliLiter(s) IV Continuous <Continuous>  HYDROmorphone  Injectable 0.5milliGRAM(s) IV Push every 4 hours PRN    PHYSICAL EXAM:  T(C): 37.2, Max: 37.2 (01-21 @ 14:00)  HR: 90 (86 - 102)  BP: 145/92 (133/90 - 176/110)  RR: 18 (16 - 18)  SpO2: 95% (91% - 96%)  Wt(kg): --    GEN: Awake, comfortable. NAD.   HEENT: NCAT, PERRL, EOMI. Mucosa moist. No JVD.   RESP: CTA b/l  CV: RRR, normal s1/s2. No m/r/g.  ABD: Soft, RUQ tenderness. BS+  EXT: Warm. No edema, clubbing, or cyanosis.   NEURO: AAOx3. No focal deficits.  	  LABS:	 	                         11.9   11.5  )-----------( 204      ( 21 Jan 2017 07:46 )             36.3     21 Jan 2017 07:46    134    |  99     |  8      ----------------------------<  130    3.9     |  27     |  0.60     Ca    8.8        21 Jan 2017 07:46  Phos  2.7       21 Jan 2017 07:46  Mg     2.0       21 Jan 2017 07:46    TPro  7.4    /  Alb  3.5    /  TBili  0.4    /  DBili  x      /  AST  33     /  ALT  35     /  AlkPhos  127    20 Jan 2017 04:07    ECG: NSR at 98 bpm

## 2017-01-21 NOTE — PROVIDER CONTACT NOTE (CHANGE IN STATUS NOTIFICATION) - ASSESSMENT
pt desats down to 89/90% , pt placed on 2-3 L,rubi oscar aware, pt encouraged use of incentive spirometer, monitor closely.

## 2017-01-22 LAB
ANION GAP SERPL CALC-SCNC: 8 MMOL/L — LOW (ref 9–16)
BUN SERPL-MCNC: 7 MG/DL — SIGNIFICANT CHANGE UP (ref 7–23)
CALCIUM SERPL-MCNC: 8.9 MG/DL — SIGNIFICANT CHANGE UP (ref 8.5–10.5)
CHLORIDE SERPL-SCNC: 98 MMOL/L — SIGNIFICANT CHANGE UP (ref 96–108)
CO2 SERPL-SCNC: 27 MMOL/L — SIGNIFICANT CHANGE UP (ref 22–31)
CREAT SERPL-MCNC: 0.55 MG/DL — SIGNIFICANT CHANGE UP (ref 0.5–1.3)
GLUCOSE SERPL-MCNC: 109 MG/DL — HIGH (ref 70–99)
HCT VFR BLD CALC: 34.5 % — LOW (ref 39–50)
HGB BLD-MCNC: 11.3 G/DL — LOW (ref 13–17)
MAGNESIUM SERPL-MCNC: 1.9 MG/DL — SIGNIFICANT CHANGE UP (ref 1.6–2.4)
MCHC RBC-ENTMCNC: 30.4 PG — SIGNIFICANT CHANGE UP (ref 27–34)
MCHC RBC-ENTMCNC: 32.8 G/DL — SIGNIFICANT CHANGE UP (ref 32–36)
MCV RBC AUTO: 92.7 FL — SIGNIFICANT CHANGE UP (ref 80–100)
PHOSPHATE SERPL-MCNC: 3.4 MG/DL — SIGNIFICANT CHANGE UP (ref 2.5–4.5)
PLATELET # BLD AUTO: 206 K/UL — SIGNIFICANT CHANGE UP (ref 150–400)
POTASSIUM SERPL-MCNC: 3.9 MMOL/L — SIGNIFICANT CHANGE UP (ref 3.5–5.3)
POTASSIUM SERPL-SCNC: 3.9 MMOL/L — SIGNIFICANT CHANGE UP (ref 3.5–5.3)
RBC # BLD: 3.72 M/UL — LOW (ref 4.2–5.8)
RBC # FLD: 13.2 % — SIGNIFICANT CHANGE UP (ref 10.3–16.9)
SODIUM SERPL-SCNC: 133 MMOL/L — LOW (ref 135–145)
WBC # BLD: 13.2 K/UL — HIGH (ref 3.8–10.5)
WBC # FLD AUTO: 13.2 K/UL — HIGH (ref 3.8–10.5)

## 2017-01-22 PROCEDURE — 99253 IP/OBS CNSLTJ NEW/EST LOW 45: CPT

## 2017-01-22 RX ADMIN — HYDROMORPHONE HYDROCHLORIDE 0.5 MILLIGRAM(S): 2 INJECTION INTRAMUSCULAR; INTRAVENOUS; SUBCUTANEOUS at 15:23

## 2017-01-22 RX ADMIN — LISINOPRIL 40 MILLIGRAM(S): 2.5 TABLET ORAL at 05:56

## 2017-01-22 RX ADMIN — HYDROMORPHONE HYDROCHLORIDE 0.5 MILLIGRAM(S): 2 INJECTION INTRAMUSCULAR; INTRAVENOUS; SUBCUTANEOUS at 19:46

## 2017-01-22 RX ADMIN — HYDROMORPHONE HYDROCHLORIDE 0.5 MILLIGRAM(S): 2 INJECTION INTRAMUSCULAR; INTRAVENOUS; SUBCUTANEOUS at 20:10

## 2017-01-22 RX ADMIN — HYDROMORPHONE HYDROCHLORIDE 0.5 MILLIGRAM(S): 2 INJECTION INTRAMUSCULAR; INTRAVENOUS; SUBCUTANEOUS at 07:17

## 2017-01-22 RX ADMIN — HYDROMORPHONE HYDROCHLORIDE 0.5 MILLIGRAM(S): 2 INJECTION INTRAMUSCULAR; INTRAVENOUS; SUBCUTANEOUS at 14:56

## 2017-01-22 RX ADMIN — HYDROMORPHONE HYDROCHLORIDE 0.5 MILLIGRAM(S): 2 INJECTION INTRAMUSCULAR; INTRAVENOUS; SUBCUTANEOUS at 23:55

## 2017-01-22 RX ADMIN — PANTOPRAZOLE SODIUM 40 MILLIGRAM(S): 20 TABLET, DELAYED RELEASE ORAL at 06:40

## 2017-01-22 RX ADMIN — HYDROMORPHONE HYDROCHLORIDE 0.5 MILLIGRAM(S): 2 INJECTION INTRAMUSCULAR; INTRAVENOUS; SUBCUTANEOUS at 11:15

## 2017-01-22 RX ADMIN — HYDROMORPHONE HYDROCHLORIDE 0.5 MILLIGRAM(S): 2 INJECTION INTRAMUSCULAR; INTRAVENOUS; SUBCUTANEOUS at 06:38

## 2017-01-22 RX ADMIN — SIMVASTATIN 40 MILLIGRAM(S): 20 TABLET, FILM COATED ORAL at 21:08

## 2017-01-22 RX ADMIN — Medication 120 MILLIGRAM(S): at 05:57

## 2017-01-22 RX ADMIN — HYDROMORPHONE HYDROCHLORIDE 0.5 MILLIGRAM(S): 2 INJECTION INTRAMUSCULAR; INTRAVENOUS; SUBCUTANEOUS at 10:39

## 2017-01-22 RX ADMIN — HYDROMORPHONE HYDROCHLORIDE 0.5 MILLIGRAM(S): 2 INJECTION INTRAMUSCULAR; INTRAVENOUS; SUBCUTANEOUS at 02:35

## 2017-01-22 NOTE — PHYSICAL THERAPY INITIAL EVALUATION ADULT - PERTINENT HX OF CURRENT PROBLEM, REHAB EVAL
52 yo male admitted for Left upper quadrant pain/burning, found to have grade II nontraumatic splenic hematoma seen for PT evaluation hospital day # 3

## 2017-01-22 NOTE — PHYSICAL THERAPY INITIAL EVALUATION ADULT - ADDITIONAL COMMENTS
Lives with wife in elevator building (he does not use elevator due to claustrophobia) lives on 3rd floor. No reported falls, no DME.

## 2017-01-22 NOTE — PROGRESS NOTE ADULT - SUBJECTIVE AND OBJECTIVE BOX
INTERVAL HPI/OVERNIGHT EVENTS: No acute events overnight.         SUBJECTIVE:  Flatus: [ ] YES [ ] NO             Bowel Movement: [ ] YES [ ] NO  Pain (0-10):            Pain Control Adequate: [ ] YES [ ] NO  Nausea: [ ] YES [ ] NO            Vomiting: [ ] YES [ ] NO  Diarrhea: [ ] YES [ ] NO         Constipation: [ ] YES [ ] NO     Chest Pain: [ ] YES [ ] NO    SOB:  [ ] YES [ ] NO    MEDICATIONS  (STANDING):  simvastatin 40milliGRAM(s) Oral at bedtime  pantoprazole    Tablet 40milliGRAM(s) Oral before breakfast  diltiazem   CD 120milliGRAM(s) Oral daily  lactated ringers. 1000milliLiter(s) IV Continuous <Continuous>  lisinopril 40milliGRAM(s) Oral daily    MEDICATIONS  (PRN):  ALBUTerol    90 MICROgram(s) HFA Inhaler 2Puff(s) Inhalation every 4 hours PRN Shortness of Breath and/or Wheezing  ondansetron Injectable 4milliGRAM(s) IV Push every 6 hours PRN Nausea  HYDROmorphone  Injectable 0.5milliGRAM(s) IV Push every 4 hours PRN Severe Pain (7 - 10)      Vital Signs Last 24 Hrs  T(C): 37, Max: 37.6 ( @ 18:41)  T(F): 98.6, Max: 99.7 ( @ 18:41)  HR: 98 (86 - 98)  BP: 131/79 (131/79 - 176/110)  BP(mean): 115 (106 - 136)  RR: 18 (16 - 18)  SpO2: 94% (91% - 95%)    PHYSICAL EXAM:      Constitutional: A&Ox3    Breasts:    Respiratory: non labored breathing, no respiratory distress    Cardiovascular: NSR, RRR    Gastrointestinal:                 Incision:    Genitourinary:    Extremities: (-) edema                  I&O's Detail      LABS:                        11.9   11.5  )-----------( 204      ( 2017 07:46 )             36.3     2017 07:46    134    |  99     |  8      ----------------------------<  130    3.9     |  27     |  0.60     Ca    8.8        2017 07:46  Phos  2.7       2017 07:46  Mg     2.0       2017 07:46    TPro  7.4    /  Alb  3.5    /  TBili  0.4    /  DBili  x      /  AST  33     /  ALT  35     /  AlkPhos  127    2017 04:07    PT/INR - ( 2017 04:07 )   PT: 11.7 sec;   INR: 1.05          PTT - ( 2017 04:07 )  PTT:27.3 sec  Urinalysis Basic - ( 2017 05:50 )    Color: Yellow / Appearance: Clear / S.025 / pH: x  Gluc: x / Ketone: NEGATIVE  / Bili: NEGATIVE / Urobili: 0.2 E.U./dL   Blood: x / Protein: NEGATIVE mg/dL / Nitrite: NEGATIVE   Leuk Esterase: NEGATIVE / RBC: x / WBC x   Sq Epi: x / Non Sq Epi: x / Bacteria: x        RADIOLOGY & ADDITIONAL STUDIES: INTERVAL HPI/OVERNIGHT EVENTS: No acute events overnight.         SUBJECTIVE:  Flatus: [ ] YES [ ] NO             Bowel Movement: [ ] YES [ ] NO  Pain (0-10):            Pain Control Adequate: [ ] YES [ ] NO  Nausea: [ ] YES [ ] NO            Vomiting: [ ] YES [ ] NO  Diarrhea: [ ] YES [ ] NO         Constipation: [ ] YES [ ] NO     Chest Pain: [ ] YES [ ] NO    SOB:  [ ] YES [ ] NO    MEDICATIONS  (STANDING):  simvastatin 40milliGRAM(s) Oral at bedtime  pantoprazole    Tablet 40milliGRAM(s) Oral before breakfast  diltiazem   CD 120milliGRAM(s) Oral daily  lactated ringers. 1000milliLiter(s) IV Continuous <Continuous>  lisinopril 40milliGRAM(s) Oral daily    MEDICATIONS  (PRN):  ALBUTerol    90 MICROgram(s) HFA Inhaler 2Puff(s) Inhalation every 4 hours PRN Shortness of Breath and/or Wheezing  ondansetron Injectable 4milliGRAM(s) IV Push every 6 hours PRN Nausea  HYDROmorphone  Injectable 0.5milliGRAM(s) IV Push every 4 hours PRN Severe Pain (7 - 10)      Vital Signs Last 24 Hrs  T(C): 37, Max: 37.6 ( @ 18:41)  T(F): 98.6, Max: 99.7 ( @ 18:41)  HR: 98 (86 - 98)  BP: 131/79 (131/79 - 176/110)  BP(mean): 115 (106 - 136)  RR: 18 (16 - 18)  SpO2: 94% (91% - 95%)    PHYSICAL EXAM:      Constitutional: A&Ox3    Respiratory: non labored breathing, no respiratory distress    Cardiovascular: NSR, RRR    Gastrointestinal:                 Incision:    Extremities: (-) edema                  I&O's Detail      LABS:                        11.9   11.5  )-----------( 204      ( 2017 07:46 )             36.3     2017 07:46    134    |  99     |  8      ----------------------------<  130    3.9     |  27     |  0.60     Ca    8.8        2017 07:46  Phos  2.7       2017 07:46  Mg     2.0       2017 07:46    TPro  7.4    /  Alb  3.5    /  TBili  0.4    /  DBili  x      /  AST  33     /  ALT  35     /  AlkPhos  127    2017 04:07    PT/INR - ( 2017 04:07 )   PT: 11.7 sec;   INR: 1.05          PTT - ( 2017 04:07 )  PTT:27.3 sec  Urinalysis Basic - ( 2017 05:50 )    Color: Yellow / Appearance: Clear / S.025 / pH: x  Gluc: x / Ketone: NEGATIVE  / Bili: NEGATIVE / Urobili: 0.2 E.U./dL   Blood: x / Protein: NEGATIVE mg/dL / Nitrite: NEGATIVE   Leuk Esterase: NEGATIVE / RBC: x / WBC x   Sq Epi: x / Non Sq Epi: x / Bacteria: x        RADIOLOGY & ADDITIONAL STUDIES: INTERVAL HPI/OVERNIGHT EVENTS: No acute events overnight.         SUBJECTIVE:  Flatus: [ ] YES [x ] NO             Bowel Movement: [ ] YES [x ] NO  Pain (0-10):            Pain Control Adequate: [x ] YES [ ] NO  Nausea: [ ] YES [x ] NO            Vomiting: [ ] YES [ x] NO  Diarrhea: [ ] YES [ x] NO         Constipation: [ ] YES [ x] NO     Chest Pain: [ ] YES [x ] NO    SOB:  [ ] YES [x ] NO    MEDICATIONS  (STANDING):  simvastatin 40milliGRAM(s) Oral at bedtime  pantoprazole    Tablet 40milliGRAM(s) Oral before breakfast  diltiazem   CD 120milliGRAM(s) Oral daily  lactated ringers. 1000milliLiter(s) IV Continuous <Continuous>  lisinopril 40milliGRAM(s) Oral daily    MEDICATIONS  (PRN):  ALBUTerol    90 MICROgram(s) HFA Inhaler 2Puff(s) Inhalation every 4 hours PRN Shortness of Breath and/or Wheezing  ondansetron Injectable 4milliGRAM(s) IV Push every 6 hours PRN Nausea  HYDROmorphone  Injectable 0.5milliGRAM(s) IV Push every 4 hours PRN Severe Pain (7 - 10)      Vital Signs Last 24 Hrs  T(C): 37, Max: 37.6 ( @ 18:41)  T(F): 98.6, Max: 99.7 ( @ 18:41)  HR: 98 (86 - 98)  BP: 131/79 (131/79 - 176/110)  BP(mean): 115 (106 - 136)  RR: 18 (16 - 18)  SpO2: 94% (91% - 95%)    PHYSICAL EXAM:      Constitutional: A&Ox3    Respiratory: non labored breathing, no respiratory distress    Cardiovascular: NSR, RRR    Gastrointestinal: soft, non distended, dec TTP to LUQ                 Incision: N/A    Extremities: (-) edema          I&O's Detail      LABS:                        11.9   11.5  )-----------( 204      ( 2017 07:46 )             36.3     2017 07:46    134    |  99     |  8      ----------------------------<  130    3.9     |  27     |  0.60     Ca    8.8        2017 07:46  Phos  2.7       2017 07:46  Mg     2.0       2017 07:46    TPro  7.4    /  Alb  3.5    /  TBili  0.4    /  DBili  x      /  AST  33     /  ALT  35     /  AlkPhos  127    2017 04:07    PT/INR - ( 2017 04:07 )   PT: 11.7 sec;   INR: 1.05          PTT - ( 2017 04:07 )  PTT:27.3 sec  Urinalysis Basic - ( 2017 05:50 )    Color: Yellow / Appearance: Clear / S.025 / pH: x  Gluc: x / Ketone: NEGATIVE  / Bili: NEGATIVE / Urobili: 0.2 E.U./dL   Blood: x / Protein: NEGATIVE mg/dL / Nitrite: NEGATIVE   Leuk Esterase: NEGATIVE / RBC: x / WBC x   Sq Epi: x / Non Sq Epi: x / Bacteria: x        RADIOLOGY & ADDITIONAL STUDIES:

## 2017-01-22 NOTE — PHYSICAL THERAPY INITIAL EVALUATION ADULT - REHAB POTENTIAL, PT EVAL
Patient demonstrated independence with all functional skills including stair neg and bed mobility, cleared from inpatient PT program Team 4 informed./none

## 2017-01-22 NOTE — PHYSICAL THERAPY INITIAL EVALUATION ADULT - RANGE OF MOTION EXAMINATION, REHAB EVAL
bilateral upper extremity ROM was WNL (within normal limits)/bilateral lower extremity was ROM was WNL (within normal limits)

## 2017-01-23 VITALS — TEMPERATURE: 100 F

## 2017-01-23 LAB
ANION GAP SERPL CALC-SCNC: 10 MMOL/L — SIGNIFICANT CHANGE UP (ref 9–16)
BUN SERPL-MCNC: 7 MG/DL — SIGNIFICANT CHANGE UP (ref 7–23)
CALCIUM SERPL-MCNC: 9 MG/DL — SIGNIFICANT CHANGE UP (ref 8.5–10.5)
CHLORIDE SERPL-SCNC: 99 MMOL/L — SIGNIFICANT CHANGE UP (ref 96–108)
CO2 SERPL-SCNC: 27 MMOL/L — SIGNIFICANT CHANGE UP (ref 22–31)
CREAT SERPL-MCNC: 0.54 MG/DL — SIGNIFICANT CHANGE UP (ref 0.5–1.3)
GLUCOSE SERPL-MCNC: 105 MG/DL — HIGH (ref 70–99)
HCT VFR BLD CALC: 33.5 % — LOW (ref 39–50)
HGB BLD-MCNC: 11.5 G/DL — LOW (ref 13–17)
MAGNESIUM SERPL-MCNC: 2 MG/DL — SIGNIFICANT CHANGE UP (ref 1.6–2.4)
MCHC RBC-ENTMCNC: 31.8 PG — SIGNIFICANT CHANGE UP (ref 27–34)
MCHC RBC-ENTMCNC: 34.3 G/DL — SIGNIFICANT CHANGE UP (ref 32–36)
MCV RBC AUTO: 92.5 FL — SIGNIFICANT CHANGE UP (ref 80–100)
PHOSPHATE SERPL-MCNC: 3.5 MG/DL — SIGNIFICANT CHANGE UP (ref 2.5–4.5)
PLATELET # BLD AUTO: 229 K/UL — SIGNIFICANT CHANGE UP (ref 150–400)
POTASSIUM SERPL-MCNC: 3.8 MMOL/L — SIGNIFICANT CHANGE UP (ref 3.5–5.3)
POTASSIUM SERPL-SCNC: 3.8 MMOL/L — SIGNIFICANT CHANGE UP (ref 3.5–5.3)
RBC # BLD: 3.62 M/UL — LOW (ref 4.2–5.8)
RBC # FLD: 12.9 % — SIGNIFICANT CHANGE UP (ref 10.3–16.9)
SODIUM SERPL-SCNC: 136 MMOL/L — SIGNIFICANT CHANGE UP (ref 135–145)
WBC # BLD: 13.6 K/UL — HIGH (ref 3.8–10.5)
WBC # FLD AUTO: 13.6 K/UL — HIGH (ref 3.8–10.5)

## 2017-01-23 PROCEDURE — 80053 COMPREHEN METABOLIC PANEL: CPT

## 2017-01-23 PROCEDURE — 93005 ELECTROCARDIOGRAM TRACING: CPT

## 2017-01-23 PROCEDURE — 82550 ASSAY OF CK (CPK): CPT

## 2017-01-23 PROCEDURE — 99285 EMERGENCY DEPT VISIT HI MDM: CPT | Mod: 25

## 2017-01-23 PROCEDURE — 85730 THROMBOPLASTIN TIME PARTIAL: CPT

## 2017-01-23 PROCEDURE — 86901 BLOOD TYPING SEROLOGIC RH(D): CPT

## 2017-01-23 PROCEDURE — 82553 CREATINE MB FRACTION: CPT

## 2017-01-23 PROCEDURE — 85027 COMPLETE CBC AUTOMATED: CPT

## 2017-01-23 PROCEDURE — 80048 BASIC METABOLIC PNL TOTAL CA: CPT

## 2017-01-23 PROCEDURE — 86900 BLOOD TYPING SEROLOGIC ABO: CPT

## 2017-01-23 PROCEDURE — 86850 RBC ANTIBODY SCREEN: CPT

## 2017-01-23 PROCEDURE — 96376 TX/PRO/DX INJ SAME DRUG ADON: CPT | Mod: XU

## 2017-01-23 PROCEDURE — 99284 EMERGENCY DEPT VISIT MOD MDM: CPT | Mod: 25

## 2017-01-23 PROCEDURE — 86308 HETEROPHILE ANTIBODY SCREEN: CPT

## 2017-01-23 PROCEDURE — 83690 ASSAY OF LIPASE: CPT

## 2017-01-23 PROCEDURE — 81003 URINALYSIS AUTO W/O SCOPE: CPT

## 2017-01-23 PROCEDURE — 85610 PROTHROMBIN TIME: CPT

## 2017-01-23 PROCEDURE — 36415 COLL VENOUS BLD VENIPUNCTURE: CPT

## 2017-01-23 PROCEDURE — 97161 PT EVAL LOW COMPLEX 20 MIN: CPT

## 2017-01-23 PROCEDURE — 84484 ASSAY OF TROPONIN QUANT: CPT

## 2017-01-23 PROCEDURE — 83735 ASSAY OF MAGNESIUM: CPT

## 2017-01-23 PROCEDURE — 94640 AIRWAY INHALATION TREATMENT: CPT

## 2017-01-23 PROCEDURE — 80307 DRUG TEST PRSMV CHEM ANLYZR: CPT

## 2017-01-23 PROCEDURE — 96374 THER/PROPH/DIAG INJ IV PUSH: CPT | Mod: XU

## 2017-01-23 PROCEDURE — 96375 TX/PRO/DX INJ NEW DRUG ADDON: CPT

## 2017-01-23 PROCEDURE — 71046 X-RAY EXAM CHEST 2 VIEWS: CPT

## 2017-01-23 PROCEDURE — 74177 CT ABD & PELVIS W/CONTRAST: CPT

## 2017-01-23 PROCEDURE — 83605 ASSAY OF LACTIC ACID: CPT

## 2017-01-23 PROCEDURE — 84100 ASSAY OF PHOSPHORUS: CPT

## 2017-01-23 PROCEDURE — 85025 COMPLETE CBC W/AUTO DIFF WBC: CPT

## 2017-01-23 RX ORDER — HYDROMORPHONE HYDROCHLORIDE 2 MG/ML
0.5 INJECTION INTRAMUSCULAR; INTRAVENOUS; SUBCUTANEOUS
Qty: 0 | Refills: 0 | Status: DISCONTINUED | OUTPATIENT
Start: 2017-01-23 | End: 2017-01-23

## 2017-01-23 RX ORDER — DOCUSATE SODIUM 100 MG
1 CAPSULE ORAL
Qty: 15 | Refills: 0 | OUTPATIENT
Start: 2017-01-23

## 2017-01-23 RX ADMIN — Medication 10 MILLIGRAM(S): at 11:18

## 2017-01-23 RX ADMIN — PANTOPRAZOLE SODIUM 40 MILLIGRAM(S): 20 TABLET, DELAYED RELEASE ORAL at 06:02

## 2017-01-23 RX ADMIN — HYDROMORPHONE HYDROCHLORIDE 0.5 MILLIGRAM(S): 2 INJECTION INTRAMUSCULAR; INTRAVENOUS; SUBCUTANEOUS at 07:01

## 2017-01-23 RX ADMIN — HYDROMORPHONE HYDROCHLORIDE 0.5 MILLIGRAM(S): 2 INJECTION INTRAMUSCULAR; INTRAVENOUS; SUBCUTANEOUS at 13:02

## 2017-01-23 RX ADMIN — HYDROMORPHONE HYDROCHLORIDE 0.5 MILLIGRAM(S): 2 INJECTION INTRAMUSCULAR; INTRAVENOUS; SUBCUTANEOUS at 04:05

## 2017-01-23 RX ADMIN — LISINOPRIL 40 MILLIGRAM(S): 2.5 TABLET ORAL at 06:03

## 2017-01-23 RX ADMIN — Medication 120 MILLIGRAM(S): at 06:03

## 2017-01-23 RX ADMIN — HYDROMORPHONE HYDROCHLORIDE 0.5 MILLIGRAM(S): 2 INJECTION INTRAMUSCULAR; INTRAVENOUS; SUBCUTANEOUS at 00:55

## 2017-01-23 RX ADMIN — HYDROMORPHONE HYDROCHLORIDE 0.5 MILLIGRAM(S): 2 INJECTION INTRAMUSCULAR; INTRAVENOUS; SUBCUTANEOUS at 10:20

## 2017-01-23 RX ADMIN — HYDROMORPHONE HYDROCHLORIDE 0.5 MILLIGRAM(S): 2 INJECTION INTRAMUSCULAR; INTRAVENOUS; SUBCUTANEOUS at 13:20

## 2017-01-23 RX ADMIN — HYDROMORPHONE HYDROCHLORIDE 0.5 MILLIGRAM(S): 2 INJECTION INTRAMUSCULAR; INTRAVENOUS; SUBCUTANEOUS at 05:05

## 2017-01-23 RX ADMIN — HYDROMORPHONE HYDROCHLORIDE 0.5 MILLIGRAM(S): 2 INJECTION INTRAMUSCULAR; INTRAVENOUS; SUBCUTANEOUS at 08:00

## 2017-01-23 RX ADMIN — HYDROMORPHONE HYDROCHLORIDE 0.5 MILLIGRAM(S): 2 INJECTION INTRAMUSCULAR; INTRAVENOUS; SUBCUTANEOUS at 10:03

## 2017-01-23 NOTE — DISCHARGE NOTE ADULT - CARE PLAN
Principal Discharge DX:	Left upper quadrant pain  Goal:	Recovery, return to normal activities  Instructions for follow-up, activity and diet:	You were admitted for a splenic hematoma which has resolved. Upon discharge, please continue to advance diet as tolerated. No heavy lifting or contact sports until cleared by your surgeon. Contact your doctor or go to the ER for fever > 101.5, chills, nausea, vomiting, chest pain, shortness of breath, pain not controlled by medication or excessive bleeding.    Please follow up with Dr. Hu in two weeks; you may call the office to make an appointment at your earliest convenience.

## 2017-01-23 NOTE — PROGRESS NOTE ADULT - SUBJECTIVE AND OBJECTIVE BOX
O/N: +F, advanced to CLD and tolerating. Soflty distented. OOBA. Up to 1500 IS  1/22: AM CBC stable; advanced to sips of water ; no flatus; off 02 and satting 94%; OOB/A; softly distended, dec ttp to LUQ O/N: +F, advanced to CLD and tolerating. Soflty distented. OOBA. Up to 1500 IS  1/22: AM CBC stable; advanced to sips of water ; no flatus; off 02 and satting 94%; OOB/A; softly distended, dec ttp to LUQ    STATUS POST:       SUBJECTIVE:    diltiazem   CD 120milliGRAM(s) Oral daily  lisinopril 40milliGRAM(s) Oral daily      Vital Signs Last 24 Hrs  T(C): 37.6, Max: 37.7 (01-22 @ 18:38)  T(F): 99.6, Max: 99.8 (01-22 @ 18:38)  HR: 104 (92 - 104)  BP: 150/83 (130/71 - 150/83)  BP(mean): 107 (95 - 113)  RR: 16 (16 - 17)  SpO2: 94% (90% - 97%)  I&O's Detail      General: NAD, resting comfortably in bed  C/V: NSR  Pulm: Nonlabored breathing, no respiratory distress  Abd: soft, NT/ND, obese  Extrem: WWP, no edema, SCDs in place        LABS:                        11.5   13.6  )-----------( 229      ( 23 Jan 2017 06:21 )             33.5     23 Jan 2017 06:21    136    |  99     |  7      ----------------------------<  105    3.8     |  27     |  0.54     Ca    9.0        23 Jan 2017 06:21  Phos  3.5       23 Jan 2017 06:21  Mg     2.0       23 Jan 2017 06:21            RADIOLOGY & ADDITIONAL STUDIES: O/N: +F, advanced to CLD and tolerating. Soflty distented. OOBA. Up to 1500 IS  1/22: AM CBC stable; advanced to sips of water ; no flatus; off 02 and satting 94%; OOB/A; softly distended, dec ttp to LUQ     SUBJECTIVE: No complaints this AM    diltiazem   CD 120milliGRAM(s) Oral daily  lisinopril 40milliGRAM(s) Oral daily      Vital Signs Last 24 Hrs  T(C): 37.6, Max: 37.7 (01-22 @ 18:38)  T(F): 99.6, Max: 99.8 (01-22 @ 18:38)  HR: 104 (92 - 104)  BP: 150/83 (130/71 - 150/83)  BP(mean): 107 (95 - 113)  RR: 16 (16 - 17)  SpO2: 94% (90% - 97%)  I&O's Detail      General: NAD, resting comfortably in bed  C/V: NSR  Pulm: Nonlabored breathing, no respiratory distress  Abd: soft, NT/ND, obese  Extrem: WWP, no edema, SCDs in place        LABS:                        11.5   13.6  )-----------( 229      ( 23 Jan 2017 06:21 )             33.5     23 Jan 2017 06:21    136    |  99     |  7      ----------------------------<  105    3.8     |  27     |  0.54     Ca    9.0        23 Jan 2017 06:21  Phos  3.5       23 Jan 2017 06:21  Mg     2.0       23 Jan 2017 06:21            RADIOLOGY & ADDITIONAL STUDIES: O/N: +F, advanced to CLD and tolerating. Soflty distented. OOBA. Up to 1500 IS  1/22: AM CBC stable; advanced to sips of water ; no flatus; off 02 and satting 94%; OOB/A; softly distended, dec ttp to LUQ     SUBJECTIVE: No complaints this AM. No flatus, no BM    diltiazem   CD 120milliGRAM(s) Oral daily  lisinopril 40milliGRAM(s) Oral daily      Vital Signs Last 24 Hrs  T(C): 37.6, Max: 37.7 (01-22 @ 18:38)  T(F): 99.6, Max: 99.8 (01-22 @ 18:38)  HR: 104 (92 - 104)  BP: 150/83 (130/71 - 150/83)  BP(mean): 107 (95 - 113)  RR: 16 (16 - 17)  SpO2: 94% (90% - 97%)  I&O's Detail      General: NAD, resting comfortably in bed  C/V: NSR  Pulm: Nonlabored breathing, no respiratory distress  Abd: soft, NT/ND, obese  Extrem: WWP, no edema, SCDs in place        LABS:                        11.5   13.6  )-----------( 229      ( 23 Jan 2017 06:21 )             33.5     23 Jan 2017 06:21    136    |  99     |  7      ----------------------------<  105    3.8     |  27     |  0.54     Ca    9.0        23 Jan 2017 06:21  Phos  3.5       23 Jan 2017 06:21  Mg     2.0       23 Jan 2017 06:21            RADIOLOGY & ADDITIONAL STUDIES:

## 2017-01-23 NOTE — DISCHARGE NOTE ADULT - MEDICATION SUMMARY - MEDICATIONS TO TAKE
I will START or STAY ON the medications listed below when I get home from the hospital:    aspirin 81 mg oral tablet  -- 1 tab(s) by mouth once a day  -- Indication: For ppx/home med    lisinopril 20 mg oral tablet  -- 1 tab(s) by mouth once a day  -- Indication: For HTN/home med    nitroglycerin 0.4 mg sublingual tablet  --  under tongue , As Needed  -- Indication: For angina/home med    diltiazem 24 hour extended release  -- 120 milligram(s) by mouth once a day  -- Indication: For antiarrhythmic/home med    simvastatin  --  by mouth   -- Indication: For HLD/home med    Advair Diskus 100 mcg-50 mcg inhalation powder  -- 1 puff(s) inhaled 2 times a day  -- Check with your doctor before becoming pregnant.  For inhalation only.  Obtain medical advice before taking any non-prescription drugs as some may affect the action of this medication.  Rinse mouth thoroughly after use.    -- Indication: For asthma/home med    Ventolin HFA CFC free 90 mcg/inh inhalation aerosol  -- 2 puff(s) inhaled 4 times a day  -- For inhalation only.  It is very important that you take or use this exactly as directed.  Do not skip doses or discontinue unless directed by your doctor.  Obtain medical advice before taking any non-prescription drugs as some may affect the action of this medication.  Shake well before use.    -- Indication: For asthma/home med    Spiriva 18 mcg inhalation capsule  -- 1 cap(s) inhaled once a day  -- Check with your doctor before becoming pregnant.  For inhalation only.  It is very important that you take or use this exactly as directed.  Do not skip doses or discontinue unless directed by your doctor.  Obtain medical advice before taking any non-prescription drugs as some may affect the action of this medication.    -- Indication: For asthma/home med    NexIUM  --  by mouth   -- Indication: For PPI/home med I will START or STAY ON the medications listed below when I get home from the hospital:    aspirin 81 mg oral tablet  -- 1 tab(s) by mouth once a day  -- Indication: For ppx/home med    oxyCODONE-acetaminophen 5mg-325mg oral tablet  -- 1 tab(s) by mouth every 4 to 6 hours, As Needed -for severe pain MDD:6  -- Caution federal law prohibits the transfer of this drug to any person other  than the person for whom it was prescribed.  May cause drowsiness.  Alcohol may intensify this effect.  Use care when operating dangerous machinery.  This prescription cannot be refilled.  This product contains acetaminophen.  Do not use  with any other product containing acetaminophen to prevent possible liver damage.  Using more of this medication than prescribed may cause serious breathing problems.    -- Indication: For pain, as needed    lisinopril 20 mg oral tablet  -- 1 tab(s) by mouth once a day  -- Indication: For HTN/home med    nitroglycerin 0.4 mg sublingual tablet  --  under tongue , As Needed  -- Indication: For angina/home med    diltiazem 24 hour extended release  -- 120 milligram(s) by mouth once a day  -- Indication: For antiarrhythmic/home med    simvastatin  --  by mouth   -- Indication: For HLD/home med    Advair Diskus 100 mcg-50 mcg inhalation powder  -- 1 puff(s) inhaled 2 times a day  -- Check with your doctor before becoming pregnant.  For inhalation only.  Obtain medical advice before taking any non-prescription drugs as some may affect the action of this medication.  Rinse mouth thoroughly after use.    -- Indication: For asthma/home med    Ventolin HFA CFC free 90 mcg/inh inhalation aerosol  -- 2 puff(s) inhaled 4 times a day  -- For inhalation only.  It is very important that you take or use this exactly as directed.  Do not skip doses or discontinue unless directed by your doctor.  Obtain medical advice before taking any non-prescription drugs as some may affect the action of this medication.  Shake well before use.    -- Indication: For asthma/home med    Spiriva 18 mcg inhalation capsule  -- 1 cap(s) inhaled once a day  -- Check with your doctor before becoming pregnant.  For inhalation only.  It is very important that you take or use this exactly as directed.  Do not skip doses or discontinue unless directed by your doctor.  Obtain medical advice before taking any non-prescription drugs as some may affect the action of this medication.    -- Indication: For asthma/home med    Colace 100 mg oral capsule  -- 1 cap(s) by mouth 2 times a day, As Needed -for constipation  -- Medication should be taken with plenty of water.    -- Indication: For constipation, as needed    NexIUM  --  by mouth   -- Indication: For PPI/home med

## 2017-01-23 NOTE — DISCHARGE NOTE ADULT - HOSPITAL COURSE
51yoM with PMH HTN, pancreatitis, EtOH abuse admitted with grade II splenic hematoma. Patient was managed nonoperatively, pain was well controlled, diet was advanced as tolerated. Abdominal exam remained soft, improving. Vital signs stable. No intervention required. On day of discharge, pt deemed stable and ready to return home with plan for follow up as outpatient.

## 2017-01-23 NOTE — DISCHARGE NOTE ADULT - PLAN OF CARE
Recovery, return to normal activities You were admitted for a splenic hematoma which has resolved. Upon discharge, please continue to advance diet as tolerated. No heavy lifting or contact sports until cleared by your surgeon. Contact your doctor or go to the ER for fever > 101.5, chills, nausea, vomiting, chest pain, shortness of breath, pain not controlled by medication or excessive bleeding.    Please follow up with Dr. Hu in two weeks; you may call the office to make an appointment at your earliest convenience.

## 2017-01-23 NOTE — PROGRESS NOTE ADULT - ASSESSMENT
51M w/ HTN, CAD/MI s/p angioplasty, EtOH abuse with atraumatic Grade II splenic hematoma.     CLD  Pain/Nausea Control PRN  Serial Abdominal Exams   Hold SQH/ASA   HTN control - cont diltiazem/lisinopril   OOB/A 51M w/ HTN, CAD/MI s/p angioplasty, EtOH abuse with atraumatic Grade II splenic hematoma.     CLD, advance to RD  Pain/Nausea Control PRN  Serial Abdominal Exams   Hold SQH/ASA   HTN control - cont diltiazem/lisinopril   OOB/A  d/c home today

## 2017-01-23 NOTE — DISCHARGE NOTE ADULT - NS AS ACTIVITY OBS
Walking-Indoors allowed/Driving allowed/Stairs allowed/Walking-Outdoors allowed/Showering allowed/No Heavy lifting/straining

## 2017-01-23 NOTE — DISCHARGE NOTE ADULT - PATIENT PORTAL LINK FT
“You can access the [facility] Patient Portal, offered by [facility], by registering with the following website: [email or web address]”

## 2017-01-26 ENCOUNTER — APPOINTMENT (OUTPATIENT)
Dept: INTERNAL MEDICINE | Facility: CLINIC | Age: 52
End: 2017-01-26

## 2017-01-26 VITALS
OXYGEN SATURATION: 96 % | TEMPERATURE: 97.9 F | HEIGHT: 72 IN | DIASTOLIC BLOOD PRESSURE: 86 MMHG | BODY MASS INDEX: 31.15 KG/M2 | HEART RATE: 95 BPM | SYSTOLIC BLOOD PRESSURE: 130 MMHG | WEIGHT: 230 LBS

## 2017-01-26 DIAGNOSIS — Z79.82 LONG TERM (CURRENT) USE OF ASPIRIN: ICD-10-CM

## 2017-01-26 DIAGNOSIS — Z83.3 FAMILY HISTORY OF DIABETES MELLITUS: ICD-10-CM

## 2017-01-26 DIAGNOSIS — R10.9 UNSPECIFIED ABDOMINAL PAIN: ICD-10-CM

## 2017-01-26 DIAGNOSIS — I25.2 OLD MYOCARDIAL INFARCTION: ICD-10-CM

## 2017-01-26 DIAGNOSIS — D73.5 INFARCTION OF SPLEEN: ICD-10-CM

## 2017-01-26 DIAGNOSIS — F17.210 NICOTINE DEPENDENCE, CIGARETTES, UNCOMPLICATED: ICD-10-CM

## 2017-01-26 DIAGNOSIS — Z95.5 PRESENCE OF CORONARY ANGIOPLASTY IMPLANT AND GRAFT: ICD-10-CM

## 2017-01-26 DIAGNOSIS — I10 ESSENTIAL (PRIMARY) HYPERTENSION: ICD-10-CM

## 2017-01-26 DIAGNOSIS — I25.10 ATHEROSCLEROTIC HEART DISEASE OF NATIVE CORONARY ARTERY WITHOUT ANGINA PECTORIS: ICD-10-CM

## 2017-01-26 DIAGNOSIS — K21.9 GASTRO-ESOPHAGEAL REFLUX DISEASE WITHOUT ESOPHAGITIS: ICD-10-CM

## 2017-01-26 DIAGNOSIS — Z13.6 ENCOUNTER FOR SCREENING FOR CARDIOVASCULAR DISORDERS: ICD-10-CM

## 2017-01-26 DIAGNOSIS — Z80.7 FAMILY HISTORY OF OTHER MALIGNANT NEOPLASMS OF LYMPHOID, HEMATOPOIETIC AND RELATED TISSUES: ICD-10-CM

## 2017-01-26 DIAGNOSIS — E78.5 HYPERLIPIDEMIA, UNSPECIFIED: ICD-10-CM

## 2017-01-26 DIAGNOSIS — J45.909 UNSPECIFIED ASTHMA, UNCOMPLICATED: ICD-10-CM

## 2017-01-26 DIAGNOSIS — F10.10 ALCOHOL ABUSE, UNCOMPLICATED: ICD-10-CM

## 2017-01-26 DIAGNOSIS — Z81.8 FAMILY HISTORY OF OTHER MENTAL AND BEHAVIORAL DISORDERS: ICD-10-CM

## 2017-01-29 ENCOUNTER — INPATIENT (INPATIENT)
Facility: HOSPITAL | Age: 52
LOS: 7 days | Discharge: HOME | DRG: 175 | End: 2017-02-06
Attending: SURGERY | Admitting: SURGERY
Payer: MEDICARE

## 2017-01-29 VITALS
RESPIRATION RATE: 20 BRPM | DIASTOLIC BLOOD PRESSURE: 89 MMHG | HEART RATE: 109 BPM | HEIGHT: 73 IN | OXYGEN SATURATION: 96 % | WEIGHT: 235.01 LBS | SYSTOLIC BLOOD PRESSURE: 142 MMHG | TEMPERATURE: 101 F

## 2017-01-29 LAB
ALBUMIN SERPL ELPH-MCNC: 3 G/DL — LOW (ref 3.4–5)
ALP SERPL-CCNC: 111 U/L — SIGNIFICANT CHANGE UP (ref 40–120)
ALT FLD-CCNC: 30 U/L — SIGNIFICANT CHANGE UP (ref 12–42)
ANION GAP SERPL CALC-SCNC: 8 MMOL/L — LOW (ref 9–16)
APPEARANCE UR: CLEAR — SIGNIFICANT CHANGE UP
AST SERPL-CCNC: 36 U/L — SIGNIFICANT CHANGE UP (ref 15–37)
BASOPHILS NFR BLD AUTO: 0.9 % — SIGNIFICANT CHANGE UP (ref 0–2)
BILIRUB SERPL-MCNC: 0.4 MG/DL — SIGNIFICANT CHANGE UP (ref 0.2–1.2)
BILIRUB UR-MCNC: NEGATIVE — SIGNIFICANT CHANGE UP
BUN SERPL-MCNC: 7 MG/DL — SIGNIFICANT CHANGE UP (ref 7–23)
CALCIUM SERPL-MCNC: 8.8 MG/DL — SIGNIFICANT CHANGE UP (ref 8.5–10.5)
CHLORIDE SERPL-SCNC: 96 MMOL/L — SIGNIFICANT CHANGE UP (ref 96–108)
CO2 SERPL-SCNC: 28 MMOL/L — SIGNIFICANT CHANGE UP (ref 22–31)
COLOR SPEC: YELLOW — SIGNIFICANT CHANGE UP
CREAT SERPL-MCNC: 0.82 MG/DL — SIGNIFICANT CHANGE UP (ref 0.5–1.3)
DIFF PNL FLD: (no result)
EOSINOPHIL NFR BLD AUTO: 2.3 % — SIGNIFICANT CHANGE UP (ref 0–6)
GLUCOSE SERPL-MCNC: 109 MG/DL — HIGH (ref 70–99)
GLUCOSE UR QL: NEGATIVE — SIGNIFICANT CHANGE UP
HCT VFR BLD CALC: 34.5 % — LOW (ref 39–50)
HGB BLD-MCNC: 11.4 G/DL — LOW (ref 13–17)
KETONES UR-MCNC: NEGATIVE — SIGNIFICANT CHANGE UP
LACTATE SERPL-SCNC: 1.3 MMOL/L — SIGNIFICANT CHANGE UP (ref 0.5–2)
LEUKOCYTE ESTERASE UR-ACNC: NEGATIVE — SIGNIFICANT CHANGE UP
LYMPHOCYTES # BLD AUTO: 10.9 % — LOW (ref 13–44)
MCHC RBC-ENTMCNC: 30.2 PG — SIGNIFICANT CHANGE UP (ref 27–34)
MCHC RBC-ENTMCNC: 33 G/DL — SIGNIFICANT CHANGE UP (ref 32–36)
MCV RBC AUTO: 91.3 FL — SIGNIFICANT CHANGE UP (ref 80–100)
MONOCYTES NFR BLD AUTO: 14 % — SIGNIFICANT CHANGE UP (ref 2–14)
NEUTROPHILS NFR BLD AUTO: 71.9 % — SIGNIFICANT CHANGE UP (ref 43–77)
NITRITE UR-MCNC: NEGATIVE — SIGNIFICANT CHANGE UP
PH UR: 7 — SIGNIFICANT CHANGE UP (ref 4–8)
PLATELET # BLD AUTO: 347 K/UL — SIGNIFICANT CHANGE UP (ref 150–400)
POTASSIUM SERPL-MCNC: 4.1 MMOL/L — SIGNIFICANT CHANGE UP (ref 3.5–5.3)
POTASSIUM SERPL-SCNC: 4.1 MMOL/L — SIGNIFICANT CHANGE UP (ref 3.5–5.3)
PROT SERPL-MCNC: 7.5 G/DL — SIGNIFICANT CHANGE UP (ref 6.4–8.2)
PROT UR-MCNC: NEGATIVE MG/DL — SIGNIFICANT CHANGE UP
RBC # BLD: 3.78 M/UL — LOW (ref 4.2–5.8)
RBC # FLD: 12.6 % — SIGNIFICANT CHANGE UP (ref 10.3–16.9)
SODIUM SERPL-SCNC: 132 MMOL/L — LOW (ref 135–145)
SP GR SPEC: <=1.005 — SIGNIFICANT CHANGE UP (ref 1–1.03)
UROBILINOGEN FLD QL: 0.2 E.U./DL — SIGNIFICANT CHANGE UP
WBC # BLD: 9.3 K/UL — SIGNIFICANT CHANGE UP (ref 3.8–10.5)
WBC # FLD AUTO: 9.3 K/UL — SIGNIFICANT CHANGE UP (ref 3.8–10.5)

## 2017-01-29 PROCEDURE — 71275 CT ANGIOGRAPHY CHEST: CPT | Mod: 26

## 2017-01-29 PROCEDURE — 99285 EMERGENCY DEPT VISIT HI MDM: CPT | Mod: 25

## 2017-01-29 PROCEDURE — 93010 ELECTROCARDIOGRAM REPORT: CPT

## 2017-01-29 PROCEDURE — 74177 CT ABD & PELVIS W/CONTRAST: CPT | Mod: 26

## 2017-01-29 PROCEDURE — 49406 IMAGE CATH FLUID PERI/RETRO: CPT

## 2017-01-29 RX ORDER — PIPERACILLIN AND TAZOBACTAM 4; .5 G/20ML; G/20ML
3.38 INJECTION, POWDER, LYOPHILIZED, FOR SOLUTION INTRAVENOUS ONCE
Qty: 0 | Refills: 0 | Status: COMPLETED | OUTPATIENT
Start: 2017-01-29 | End: 2017-01-29

## 2017-01-29 RX ORDER — SODIUM CHLORIDE 9 MG/ML
1000 INJECTION INTRAMUSCULAR; INTRAVENOUS; SUBCUTANEOUS
Qty: 0 | Refills: 0 | Status: DISCONTINUED | OUTPATIENT
Start: 2017-01-29 | End: 2017-01-30

## 2017-01-29 RX ORDER — VANCOMYCIN HCL 1 G
1000 VIAL (EA) INTRAVENOUS ONCE
Qty: 0 | Refills: 0 | Status: COMPLETED | OUTPATIENT
Start: 2017-01-29 | End: 2017-01-29

## 2017-01-29 RX ORDER — SODIUM CHLORIDE 9 MG/ML
3 INJECTION INTRAMUSCULAR; INTRAVENOUS; SUBCUTANEOUS ONCE
Qty: 0 | Refills: 0 | Status: COMPLETED | OUTPATIENT
Start: 2017-01-29 | End: 2017-01-29

## 2017-01-29 RX ORDER — ACETAMINOPHEN 500 MG
650 TABLET ORAL ONCE
Qty: 0 | Refills: 0 | Status: COMPLETED | OUTPATIENT
Start: 2017-01-29 | End: 2017-01-29

## 2017-01-29 RX ORDER — ACETAMINOPHEN 500 MG
650 TABLET ORAL EVERY 6 HOURS
Qty: 0 | Refills: 0 | Status: DISCONTINUED | OUTPATIENT
Start: 2017-01-29 | End: 2017-02-02

## 2017-01-29 RX ORDER — ACETAMINOPHEN 500 MG
650 TABLET ORAL EVERY 6 HOURS
Qty: 0 | Refills: 0 | Status: DISCONTINUED | OUTPATIENT
Start: 2017-01-29 | End: 2017-01-30

## 2017-01-29 RX ADMIN — PIPERACILLIN AND TAZOBACTAM 200 GRAM(S): 4; .5 INJECTION, POWDER, LYOPHILIZED, FOR SOLUTION INTRAVENOUS at 21:18

## 2017-01-29 RX ADMIN — Medication 650 MILLIGRAM(S): at 17:55

## 2017-01-29 RX ADMIN — SODIUM CHLORIDE 3 MILLILITER(S): 9 INJECTION INTRAMUSCULAR; INTRAVENOUS; SUBCUTANEOUS at 17:00

## 2017-01-29 RX ADMIN — SODIUM CHLORIDE 130 MILLILITER(S): 9 INJECTION INTRAMUSCULAR; INTRAVENOUS; SUBCUTANEOUS at 23:51

## 2017-01-29 RX ADMIN — Medication 250 MILLIGRAM(S): at 22:04

## 2017-01-29 RX ADMIN — Medication 650 MILLIGRAM(S): at 23:59

## 2017-01-29 NOTE — CONSULT NOTE ADULT - SUBJECTIVE AND OBJECTIVE BOX
HPI:  51M w/ PMH of CAD, MI s/p angioplasty 10 years recently on ASA 81, HTN, and pancreatitis 2/2 to EtOH abuse returns to [facility] ED today with fever after discharge from the hospital 6 days ago for a grade II splenic hematoma. Per the patient, his LUQ and his L flank pain has been improving and had nearly disappeared as of two days ago.  However, he occasionally feels pain with deep inspiration/burping/hiccoughing in addition to palpation. Patient started to feel febrile two days ago but took his temperature at home and found it was only 99 deg F.  However, today, patient felt febrile again and took his temperature orally at home and found it to be 102 deg F.  Since his recent discharge instructions stated that he should return for temperatures >101.5, patient came to the hospital.  Denies N/V/C.  Patient denies any recent episodes of leg pain or swelling.  Denies any episodes of sob, however, states he has been less inclined to breath deeply due to the discomfort and is a little more immobile 2/2 the pain.  States that he has been using his IS occasionally at home since discharge from the hospital.  Patient recently in-house for a non-traumatic/spontaneous subcapsular splenic hematoma (grade II).  Patient was treated conservatively and observed closely from 1/20-1-23.  Discharged to home in stable condition.  On arrival today, patient with low grade temp, VSS: T 100.7  /89 RR 20 O2 96% on RA.  Hemoglobin stable since prior admission.  Repeat CT scan shows ...      PAST MEDICAL & SURGICAL HISTORY:  Pancreatitis  Essential hypertension: HTN (hypertension)  Nondependent alcohol abuse: Alcohol abuse  Atherosclerosis of coronary artery: CAD (coronary artery disease)  Angina pectoris: Anginal pain  Status post percutaneous transluminal coronary angioplasty: S/P angioplasty    MEDICATIONS:    ALLERGIES:  No Known Allergies    SOCIAL HISTORY:    FAMILY HISTORY:  Family history of diabetes mellitus (Sibling)  Family history of Hodgkin&#x27;s lymphoma (Mother)  Family history of mental disorder (Father): Family history of alcoholism    Vital Signs Last 24 Hrs  T(C): 36.9, Max: 38.2 (01-29 @ 16:23)  T(F): 98.4, Max: 100.7 (01-29 @ 16:23)  HR: 86 (86 - 109)  BP: 132/88 (132/88 - 142/89)  BP(mean): --  RR: 20 (20 - 20)  SpO2: 95% (95% - 96%)    PHYSICAL EXAM:  Gen: NAD, A&Ox3, appropriate affect  HEENT: nc/at, eomi, CN II-XI grossly intact, mmm  CV: RRR, no M/R/G  Pulm: CTAB, no wheezing/rales/rhonchi  Abdomen: Soft, NT/ND, no rebound/guarding, +BS  Ext/Pulses: 2+ DP/PT b/l, no c/c/e, wwp    LABS:                        11.4   9.3   )-----------( 347      ( 29 Jan 2017 17:35 )             34.5     29 Jan 2017 17:35    132    |  96     |  7      ----------------------------<  109    4.1     |  28     |  0.82     Ca    8.8        29 Jan 2017 17:35    TPro  7.5    /  Alb  3.0    /  TBili  0.4    /  DBili  x      /  AST  36     /  ALT  30     /  AlkPhos  111    29 Jan 2017 17:35    RADIOLOGY & ADDITIONAL STUDIES:  EXAM:  CT ABDOMEN AND PELVIS IC                           PROCEDURE DATE:  01/29/2017  IMPRESSION:  Since the prior study of 1/20/2017, there has been interval increase in   the size of a subcapsular splenic collection tracking along the left   paracolic gutter. While the contents of this collection of decreased   density, there is new mild rim enhancement and slightly increased   stranding of the mesenteric fat surrounding the collection. This may   represent resolution although superinfection is not excluded.    There is a new small left-sided pleural effusion and new bibasilar   atelectasis.    Slight thickening of the pancreatic tail with surrounding haziness.   Correlation with pancreatic enzymes is recommended to exclude hepatitis.   0.7 cm low-attenuation lesion in the pancreatic tail may represent   pseudocysts. Correlation with MRI on nonemergent basis can be obtained.    EXAM:  CT ANGIO CHEST PE PROTOCOL IC                           PROCEDURE DATE:  01/29/2017 IMPRESSION:   Suboptimal opacification of the pulmonary arterial tree. Within this   limitation, there appears to be small pulmonary emboli within the   segmental branches to the left lower lobe. No evidence of right heart   strain.    New moderate left pleural effusion with new bibasilar atelectasis.    Subcapsular splenic collection. This is better evaluated on the   concomitant CT of the abdomen and pelvis.    Slight fullness of the pancreatic tail with hazinessof the surrounding   mesenteric fat. Clinical correlation with pancreatic enzymes is   recommended to exclude pancreatitis. HPI:  51M w/ PMH of CAD, MI s/p angioplasty 10 years recently on ASA 81, HTN, and pancreatitis 2/2 to EtOH abuse returns to [facility] ED today with fever after discharge from the hospital 6 days ago for a grade II nontraumatic splenic hematoma. Per the patient, his LUQ and his L flank pain has been improving and had nearly disappeared as of two days ago.  However, he occasionally feels pain with deep inspiration/burping/hiccoughing in addition to palpation. Patient started to feel febrile two days ago but took his temperature at home and found it was only 99 deg F.  However, today, patient felt febrile again and took his temperature orally at home and found it to be 102 deg F.  Since his recent discharge instructions stated that he should return for temperatures >101.5, patient came to the hospital.  Denies N/V/C.  Patient denies any recent episodes of leg pain or swelling.  Denies any episodes of sob, however, states he has been less inclined to breath deeply due to the discomfort and is a little more immobile 2/2 the pain.  States that he has been using his IS occasionally at home since discharge from the hospital.  Patient recently in-house for a non-traumatic/spontaneous subcapsular splenic hematoma (grade II).  Patient was treated conservatively and observed closely from 1/20-1-23.  Discharged to home in stable condition.  On arrival today, patient with low grade temp, VSS: T 100.7  /89 RR 20 O2 96% on RA.  Hemoglobin stable since prior admission.  Repeat CT scan shows ...      PAST MEDICAL & SURGICAL HISTORY:  Pancreatitis  Essential hypertension: HTN (hypertension)  Nondependent alcohol abuse: Alcohol abuse  Atherosclerosis of coronary artery: CAD (coronary artery disease)  Angina pectoris: Anginal pain  Status post percutaneous transluminal coronary angioplasty: S/P angioplasty    MEDICATIONS:    ALLERGIES:  No Known Allergies    SOCIAL HISTORY:    FAMILY HISTORY:  Family history of diabetes mellitus (Sibling)  Family history of Hodgkin&#x27;s lymphoma (Mother)  Family history of mental disorder (Father): Family history of alcoholism    Vital Signs Last 24 Hrs  T(C): 36.9, Max: 38.2 (01-29 @ 16:23)  T(F): 98.4, Max: 100.7 (01-29 @ 16:23)  HR: 86 (86 - 109)  BP: 132/88 (132/88 - 142/89)  BP(mean): --  RR: 20 (20 - 20)  SpO2: 95% (95% - 96%)    PHYSICAL EXAM:  Gen: NAD, A&Ox3, appropriate affect  HEENT: nc/at, eomi, CN II-XI grossly intact, mmm  CV: RRR, no M/R/G  Pulm: CTAB, no wheezing/rales/rhonchi  Abdomen: Soft, NT/ND, no rebound/guarding, +BS  Ext/Pulses: 2+ DP/PT b/l, no c/c/e, wwp    LABS:                        11.4   9.3   )-----------( 347      ( 29 Jan 2017 17:35 )             34.5     29 Jan 2017 17:35    132    |  96     |  7      ----------------------------<  109    4.1     |  28     |  0.82     Ca    8.8        29 Jan 2017 17:35    TPro  7.5    /  Alb  3.0    /  TBili  0.4    /  DBili  x      /  AST  36     /  ALT  30     /  AlkPhos  111    29 Jan 2017 17:35    RADIOLOGY & ADDITIONAL STUDIES:  EXAM:  CT ABDOMEN AND PELVIS IC                           PROCEDURE DATE:  01/29/2017  IMPRESSION:  Since the prior study of 1/20/2017, there has been interval increase in   the size of a subcapsular splenic collection tracking along the left   paracolic gutter. While the contents of this collection of decreased   density, there is new mild rim enhancement and slightly increased   stranding of the mesenteric fat surrounding the collection. This may   represent resolution although superinfection is not excluded.    There is a new small left-sided pleural effusion and new bibasilar   atelectasis.    Slight thickening of the pancreatic tail with surrounding haziness.   Correlation with pancreatic enzymes is recommended to exclude hepatitis.   0.7 cm low-attenuation lesion in the pancreatic tail may represent   pseudocysts. Correlation with MRI on nonemergent basis can be obtained.    EXAM:  CT ANGIO CHEST PE PROTOCOL IC                           PROCEDURE DATE:  01/29/2017 IMPRESSION:   Suboptimal opacification of the pulmonary arterial tree. Within this   limitation, there appears to be small pulmonary emboli within the   segmental branches to the left lower lobe. No evidence of right heart   strain.    New moderate left pleural effusion with new bibasilar atelectasis.    Subcapsular splenic collection. This is better evaluated on the   concomitant CT of the abdomen and pelvis.    Slight fullness of the pancreatic tail with hazinessof the surrounding   mesenteric fat. Clinical correlation with pancreatic enzymes is   recommended to exclude pancreatitis. HPI:  51M w/ PMH of CAD, MI s/p angioplasty 10 years on ASA 81, HTN, and pancreatitis 2/2 to EtOH abuse returns to [facility] ED today with fever after discharge from the hospital 6 days ago for a grade II nontraumatic splenic hematoma. Per the patient, his LUQ and his L flank pain has been improving and had nearly disappeared as of two days ago.  However, he occasionally feels pain with deep inspiration/burping/hiccoughing in addition to palpation. Patient started to feel febrile two days ago but took his temperature at home and found it was only 99 deg F.  However, today, patient felt febrile again and took his temperature orally at home and found it to be 102 deg F.  Since his recent discharge instructions stated that he should return for temperatures >101.5, patient came to the hospital.  Denies N/V/C.  Patient denies any recent episodes of leg pain or swelling.  Denies any episodes of sob, however, states he has been less inclined to breath deeply due to the discomfort and is a little more immobile 2/2 the pain.  States that he has been using his IS occasionally at home since discharge from the hospital.  Patient recently in-house for a non-traumatic/spontaneous subcapsular splenic hematoma (grade II).  Patient was treated conservatively and observed closely from 1/20-1-23.  Discharged to home in stable condition.  On arrival today, patient with low grade temp, VSS: T 100.7  /89 RR 20 O2 96% on RA.  Hemoglobin stable since prior admission.  Repeat CT scan shows mild progression of hematoma degradation but no significant change.  CT chest with moderate L-sided effusion w/atelectasis.       PAST MEDICAL & SURGICAL HISTORY:  Pancreatitis  Essential hypertension: HTN (hypertension)  Nondependent alcohol abuse: Alcohol abuse  Atherosclerosis of coronary artery: CAD (coronary artery disease)  Angina pectoris: Anginal pain  Status post percutaneous transluminal coronary angioplasty: S/P angioplasty    MEDICATIONS:  · 	Colace 100 mg oral capsule: 1 cap(s) orally 2 times a day, As Needed -for constipation  · 	oxyCODONE-acetaminophen 5mg-325mg oral tablet: 1 tab(s) orally every 4 to 6 hours, As Needed -for severe pain MDD:6  · 	Spiriva 18 mcg inhalation capsule: 1 cap(s) inhaled once a day  · 	Ventolin HFA CFC free 90 mcg/inh inhalation aerosol: 2 puff(s) inhaled 4 times a day  · 	Advair Diskus 100 mcg-50 mcg inhalation powder: 1 puff(s) inhaled 2 times a day  · 	simvastatin:  orally   · 	NexIUM:  orally   · 	diltiazem 24 hour extended release: 120 milligram(s) orally once a day  · 	lisinopril 20 mg oral tablet: 1 tab(s) orally once a day  · 	nitroglycerin 0.4 mg sublingual tablet:  sublingual , As Needed  · 	aspirin 81 mg oral tablet: 1 tab(s) orally once a day    ALLERGIES:  No Known Allergies    SOCIAL HISTORY: H/o polysubstance abuse,   past cocaine use, 40 pack year tobacco, quit 2 weeks ago after discharge from [facility], past etoh abuse, currently drinks 1 beer daily	    FAMILY HISTORY:  Family history of diabetes mellitus (Sibling)  Family history of Hodgkin&#x27;s lymphoma (Mother)  Family history of mental disorder (Father): Family history of alcoholism    Vital Signs Last 24 Hrs  T(C): 36.9, Max: 38.2 (01-29 @ 16:23)  T(F): 98.4, Max: 100.7 (01-29 @ 16:23)  HR: 86 (86 - 109)  BP: 132/88 (132/88 - 142/89)  BP(mean): --  RR: 20 (20 - 20)  SpO2: 95% (95% - 96%)    PHYSICAL EXAM:  Gen: NAD, A&Ox3, appropriate affect  HEENT: nc/at, eomi, CN II-XI grossly intact, mmm  CV: RRR, no M/R/G  Pulm: CTAB, no wheezing/rales/rhonchi  Abdomen: Soft, NT/ND, no rebound/guarding, +BS, mild ttp with palpation of L upper flank  Ext/Pulses: 2+ DP/PT b/l, no c/c/e, wwp, no leg swelling, no tenderness to palpation, L mid lower leg medial radial scar from being stung by a sting ray    LABS:                        11.4   9.3   )-----------( 347      ( 29 Jan 2017 17:35 )             34.5     29 Jan 2017 17:35    132    |  96     |  7      ----------------------------<  109    4.1     |  28     |  0.82     Ca    8.8        29 Jan 2017 17:35    TPro  7.5    /  Alb  3.0    /  TBili  0.4    /  DBili  x      /  AST  36     /  ALT  30     /  AlkPhos  111    29 Jan 2017 17:35    RADIOLOGY & ADDITIONAL STUDIES:  EXAM:  CT ABDOMEN AND PELVIS IC                           PROCEDURE DATE:  01/29/2017  IMPRESSION:  Since the prior study of 1/20/2017, there has been interval increase in   the size of a subcapsular splenic collection tracking along the left   paracolic gutter. While the contents of this collection of decreased   density, there is new mild rim enhancement and slightly increased   stranding of the mesenteric fat surrounding the collection. This may   represent resolution although superinfection is not excluded.    There is a new small left-sided pleural effusion and new bibasilar   atelectasis.    Slight thickening of the pancreatic tail with surrounding haziness.   Correlation with pancreatic enzymes is recommended to exclude hepatitis.   0.7 cm low-attenuation lesion in the pancreatic tail may represent   pseudocysts. Correlation with MRI on nonemergent basis can be obtained.    EXAM:  CT ANGIO CHEST PE PROTOCOL IC                           PROCEDURE DATE:  01/29/2017 IMPRESSION:   Suboptimal opacification of the pulmonary arterial tree. Within this   limitation, there appears to be small pulmonary emboli within the   segmental branches to the left lower lobe. No evidence of right heart   strain.    New moderate left pleural effusion with new bibasilar atelectasis.    Subcapsular splenic collection. This is better evaluated on the   concomitant CT of the abdomen and pelvis.    Slight fullness of the pancreatic tail with hazinessof the surrounding   mesenteric fat. Clinical correlation with pancreatic enzymes is   recommended to exclude pancreatitis.

## 2017-01-29 NOTE — ED PROVIDER NOTE - CARE PLAN
Principal Discharge DX:	Other acute pulmonary embolism without acute cor pulmonale  Secondary Diagnosis:	Pleural effusion on left  Secondary Diagnosis:	Laceration of spleen, subsequent encounter

## 2017-01-29 NOTE — ED ADULT TRIAGE NOTE - CHIEF COMPLAINT QUOTE
Patient c/o fever started this morning . Was d/c home last monday for splenic hematoma .  Still with slight abdominal pain . Denies any chest pain nor sob .

## 2017-01-29 NOTE — ED ADULT NURSE NOTE - CHPI ED SYMPTOMS NEG
no tingling/no vomiting/no dizziness/no numbness/no decreased eating/drinking/no weakness/no chills/no nausea

## 2017-01-29 NOTE — ED ADULT NURSE REASSESSMENT NOTE - NS ED NURSE REASSESS COMMENT FT1
Pt endorsed to me from previous shift, A&O x4, c/o LUQ pain radiating to the back, rated 3/10 at present time, VS stable. will continue to monitor.

## 2017-01-29 NOTE — ED PROVIDER NOTE - OBJECTIVE STATEMENT
51yoM with hx of HTN, CAD s/p angioplasty, HLD, with recent admission for spontaneous splenic rupture, presents with fever today. Pt woke up with some malaise, has had mild cough and nasal congestion, was feeling tired. He took a nap during the day, and when he woke up he felt really warm and checked his temperature, was 102.5 at home. He had one percocet left from his recent admission, so took it and came to ED for eval. Pt reports feeling well yesterday, no n/v/d. no urinary sx. When he was diagnosed with the splenic hematoma, he was having L sided abd pain, primarily in LUQ, and overall that pain is much better and was well controlled with the percocet, which he was using less frequently in the past few days. He does report a new pain in the similar area but a little bit higher and it radiates to the back. Worse with deeps breaths and cough. No recent trauma. He has been fairly inactive since his discharge because he did not want to make the splenic hematoma worse. No leg pain or swelling. No headache, no dizziness, +wife sick with URI sx, but overall better. no other complaints.

## 2017-01-29 NOTE — ED PROVIDER NOTE - MEDICAL DECISION MAKING DETAILS
51yoM with fever, L lower chest wall/LUQ pain, cough and congestion. recently admitted for splenic laceration/hematoma which presented with LUQ pain but is overall improving, and the lower chest wall/back pain is different than what he experienced with the splenic hematoma. Workup shows L pleural effusion with subsegmental PE and enlargement of the splenic hematoma. Surgery consulted and no acute intervention indicated re: hematoma. Unable to acutely anticoagulate pt for PE given the hematoma, will admit for further workup for more definitive diagnosis for PE.

## 2017-01-29 NOTE — CONSULT NOTE ADULT - ASSESSMENT
51M w/ PMH of CAD, MI s/p angioplasty 10 years on ASA 81, HTN, and pancreatitis 2/2 to EtOH abuse returns to [facility] ED today with fever after discharge from the hospital 6 days ago for a grade II nontraumatic splenic hematoma.  Patient with low grade temp in ED, VSS, satting well on RA, no current abdominal pain, no sob, no LE swelling/pain, no leukocytosis.      - No current surgical indication; splenic hematoma appears to be progressing/auto-digesting without expansion, unlikely infected/infectious source of fevers especially with lung pathology to account for fevers and no leukocytosis  - CT shows moderate L-sided effusion with atelectasis that could easily account for fevers; Was concern for LLL subsegmental PE but CT study is "limited"; Would recommend LE doppler to r/o DVT  - Recommend aggressive pulmonary toilet and continued incentive spirometry  - Gave patient card for Acute Care Surgery, can follow-up with them as an outpatient wrt the splenic hematoma  - All care per primary medicine team

## 2017-01-29 NOTE — ED PROVIDER NOTE - CARDIAC, MLM
Tachycardic rate, regular rhythm.  Heart sounds S1, S2.  No murmurs, rubs or gallops. +L lower chest wall ttp

## 2017-01-30 DIAGNOSIS — R63.8 OTHER SYMPTOMS AND SIGNS CONCERNING FOOD AND FLUID INTAKE: ICD-10-CM

## 2017-01-30 DIAGNOSIS — R65.10 SYSTEMIC INFLAMMATORY RESPONSE SYNDROME (SIRS) OF NON-INFECTIOUS ORIGIN WITHOUT ACUTE ORGAN DYSFUNCTION: ICD-10-CM

## 2017-01-30 DIAGNOSIS — A41.9 SEPSIS, UNSPECIFIED ORGANISM: ICD-10-CM

## 2017-01-30 DIAGNOSIS — S36.039D UNSPECIFIED LACERATION OF SPLEEN, SUBSEQUENT ENCOUNTER: ICD-10-CM

## 2017-01-30 DIAGNOSIS — Z41.8 ENCOUNTER FOR OTHER PROCEDURES FOR PURPOSES OTHER THAN REMEDYING HEALTH STATE: ICD-10-CM

## 2017-01-30 DIAGNOSIS — F19.10 OTHER PSYCHOACTIVE SUBSTANCE ABUSE, UNCOMPLICATED: ICD-10-CM

## 2017-01-30 DIAGNOSIS — I26.99 OTHER PULMONARY EMBOLISM WITHOUT ACUTE COR PULMONALE: ICD-10-CM

## 2017-01-30 DIAGNOSIS — D64.9 ANEMIA, UNSPECIFIED: ICD-10-CM

## 2017-01-30 DIAGNOSIS — I25.10 ATHEROSCLEROTIC HEART DISEASE OF NATIVE CORONARY ARTERY WITHOUT ANGINA PECTORIS: ICD-10-CM

## 2017-01-30 LAB
25(OH)D3 SERPL-MCNC: 9 NG/ML
ALBUMIN SERPL ELPH-MCNC: 4.1 G/DL
ALP BLD-CCNC: 114 U/L
ALT SERPL-CCNC: 19 U/L
ANION GAP SERPL CALC-SCNC: 17 MMOL/L
ANION GAP SERPL CALC-SCNC: 9 MMOL/L — SIGNIFICANT CHANGE UP (ref 9–16)
APTT BLD: 32.4 SEC — SIGNIFICANT CHANGE UP (ref 27.5–37.4)
APTT BLD: 56.7 SEC — HIGH (ref 27.5–37.4)
AST SERPL-CCNC: 24 U/L
BASOPHILS # BLD AUTO: 0.06 K/UL
BASOPHILS NFR BLD AUTO: 0.5 %
BILIRUB SERPL-MCNC: 0.6 MG/DL
BUN SERPL-MCNC: 6 MG/DL
BUN SERPL-MCNC: 8 MG/DL — SIGNIFICANT CHANGE UP (ref 7–23)
CALCIUM SERPL-MCNC: 8.8 MG/DL — SIGNIFICANT CHANGE UP (ref 8.5–10.5)
CALCIUM SERPL-MCNC: 9.3 MG/DL
CHLORIDE SERPL-SCNC: 100 MMOL/L — SIGNIFICANT CHANGE UP (ref 96–108)
CHLORIDE SERPL-SCNC: 94 MMOL/L
CHOLEST SERPL-MCNC: 136 MG/DL
CHOLEST/HDLC SERPL: 4.2 RATIO
CO2 SERPL-SCNC: 26 MMOL/L
CO2 SERPL-SCNC: 26 MMOL/L — SIGNIFICANT CHANGE UP (ref 22–31)
CREAT SERPL-MCNC: 0.68 MG/DL
CREAT SERPL-MCNC: 0.76 MG/DL — SIGNIFICANT CHANGE UP (ref 0.5–1.3)
EOSINOPHIL # BLD AUTO: 0.23 K/UL
EOSINOPHIL NFR BLD AUTO: 1.8 %
FERRITIN SERPL-MCNC: 610.2 NG/ML — HIGH (ref 26–388)
FLUAV H1 2009 PAND RNA SPEC QL NAA+PROBE: DETECTED
GLUCOSE SERPL-MCNC: 101 MG/DL — HIGH (ref 70–99)
GLUCOSE SERPL-MCNC: 102 MG/DL
HBA1C MFR BLD HPLC: 6.4 %
HCT VFR BLD CALC: 35.1 % — LOW (ref 39–50)
HCT VFR BLD CALC: 35.5 %
HCT VFR BLD CALC: 35.5 % — LOW (ref 39–50)
HDLC SERPL-MCNC: 32 MG/DL
HGB BLD-MCNC: 11.4 G/DL
HGB BLD-MCNC: 11.7 G/DL — LOW (ref 13–17)
HGB BLD-MCNC: 11.7 G/DL — LOW (ref 13–17)
IMM GRANULOCYTES NFR BLD AUTO: 0.3 %
INR BLD: 1.2 — HIGH (ref 0.88–1.16)
IRON SATN MFR SERPL: 20 UG/DL — LOW (ref 65–175)
IRON SATN MFR SERPL: 8 % — LOW (ref 26–39)
LDLC SERPL CALC-MCNC: 82 MG/DL
LIDOCAIN IGE QN: 430 U/L — HIGH (ref 73–393)
LYMPHOCYTES # BLD AUTO: 2.71 K/UL
LYMPHOCYTES NFR BLD AUTO: 20.8 %
MAGNESIUM SERPL-MCNC: 2.2 MG/DL — SIGNIFICANT CHANGE UP (ref 1.6–2.4)
MAN DIFF?: NORMAL
MCHC RBC-ENTMCNC: 29.9 PG — SIGNIFICANT CHANGE UP (ref 27–34)
MCHC RBC-ENTMCNC: 30.2 PG — SIGNIFICANT CHANGE UP (ref 27–34)
MCHC RBC-ENTMCNC: 30.8 PG
MCHC RBC-ENTMCNC: 32.1 GM/DL
MCHC RBC-ENTMCNC: 33 G/DL — SIGNIFICANT CHANGE UP (ref 32–36)
MCHC RBC-ENTMCNC: 33.3 G/DL — SIGNIFICANT CHANGE UP (ref 32–36)
MCV RBC AUTO: 90.7 FL — SIGNIFICANT CHANGE UP (ref 80–100)
MCV RBC AUTO: 90.8 FL — SIGNIFICANT CHANGE UP (ref 80–100)
MCV RBC AUTO: 95.9 FL
MONOCYTES # BLD AUTO: 1.18 K/UL
MONOCYTES NFR BLD AUTO: 9 %
NEUTROPHILS # BLD AUTO: 8.84 K/UL
NEUTROPHILS NFR BLD AUTO: 67.6 %
PLATELET # BLD AUTO: 341 K/UL
PLATELET # BLD AUTO: 358 K/UL — SIGNIFICANT CHANGE UP (ref 150–400)
PLATELET # BLD AUTO: 367 K/UL — SIGNIFICANT CHANGE UP (ref 150–400)
POTASSIUM SERPL-MCNC: 4 MMOL/L — SIGNIFICANT CHANGE UP (ref 3.5–5.3)
POTASSIUM SERPL-SCNC: 4 MMOL/L — SIGNIFICANT CHANGE UP (ref 3.5–5.3)
POTASSIUM SERPL-SCNC: 4.1 MMOL/L
PROT SERPL-MCNC: 6.8 G/DL
PROTHROM AB SERPL-ACNC: 13.4 SEC — HIGH (ref 10–13.1)
PSA FREE SERPL-MCNC: 0.05 NG/ML
RAPID RVP RESULT: DETECTED
RBC # BLD: 3.7 M/UL
RBC # BLD: 3.87 M/UL — LOW (ref 4.2–5.8)
RBC # BLD: 3.91 M/UL — LOW (ref 4.2–5.8)
RBC # FLD: 12.5 % — SIGNIFICANT CHANGE UP (ref 10.3–16.9)
RBC # FLD: 13.1 % — SIGNIFICANT CHANGE UP (ref 10.3–16.9)
RBC # FLD: 13.2 %
SODIUM SERPL-SCNC: 135 MMOL/L — SIGNIFICANT CHANGE UP (ref 135–145)
SODIUM SERPL-SCNC: 137 MMOL/L
TIBC SERPL-MCNC: 247 UG/DL — LOW (ref 250–450)
TRIGL SERPL-MCNC: 111 MG/DL
TSH SERPL-ACNC: 7.08 UIU/ML
WBC # BLD: 9 K/UL — SIGNIFICANT CHANGE UP (ref 3.8–10.5)
WBC # BLD: 9.5 K/UL — SIGNIFICANT CHANGE UP (ref 3.8–10.5)
WBC # FLD AUTO: 13.06 K/UL
WBC # FLD AUTO: 9 K/UL — SIGNIFICANT CHANGE UP (ref 3.8–10.5)
WBC # FLD AUTO: 9.5 K/UL — SIGNIFICANT CHANGE UP (ref 3.8–10.5)

## 2017-01-30 PROCEDURE — 71010: CPT | Mod: 26

## 2017-01-30 PROCEDURE — 99223 1ST HOSP IP/OBS HIGH 75: CPT | Mod: GC

## 2017-01-30 RX ORDER — PIPERACILLIN AND TAZOBACTAM 4; .5 G/20ML; G/20ML
4.5 INJECTION, POWDER, LYOPHILIZED, FOR SOLUTION INTRAVENOUS EVERY 6 HOURS
Qty: 0 | Refills: 0 | Status: DISCONTINUED | OUTPATIENT
Start: 2017-01-30 | End: 2017-01-30

## 2017-01-30 RX ORDER — NICOTINE POLACRILEX 2 MG
2 GUM BUCCAL
Qty: 0 | Refills: 0 | Status: DISCONTINUED | OUTPATIENT
Start: 2017-01-30 | End: 2017-02-06

## 2017-01-30 RX ORDER — HEPARIN SODIUM 5000 [USP'U]/ML
1400 INJECTION INTRAVENOUS; SUBCUTANEOUS
Qty: 25000 | Refills: 0 | Status: DISCONTINUED | OUTPATIENT
Start: 2017-01-30 | End: 2017-01-30

## 2017-01-30 RX ORDER — LANOLIN ALCOHOL/MO/W.PET/CERES
3 CREAM (GRAM) TOPICAL AT BEDTIME
Qty: 0 | Refills: 0 | Status: DISCONTINUED | OUTPATIENT
Start: 2017-01-30 | End: 2017-02-06

## 2017-01-30 RX ORDER — LISINOPRIL 2.5 MG/1
20 TABLET ORAL DAILY
Qty: 0 | Refills: 0 | Status: DISCONTINUED | OUTPATIENT
Start: 2017-01-30 | End: 2017-02-06

## 2017-01-30 RX ORDER — PANTOPRAZOLE SODIUM 20 MG/1
40 TABLET, DELAYED RELEASE ORAL
Qty: 0 | Refills: 0 | Status: DISCONTINUED | OUTPATIENT
Start: 2017-01-30 | End: 2017-02-06

## 2017-01-30 RX ORDER — NITROGLYCERIN 6.5 MG
0.3 CAPSULE, EXTENDED RELEASE ORAL THREE TIMES A DAY
Qty: 0 | Refills: 0 | Status: DISCONTINUED | OUTPATIENT
Start: 2017-01-30 | End: 2017-01-30

## 2017-01-30 RX ORDER — DOCUSATE SODIUM 100 MG
100 CAPSULE ORAL
Qty: 0 | Refills: 0 | Status: DISCONTINUED | OUTPATIENT
Start: 2017-01-30 | End: 2017-02-06

## 2017-01-30 RX ORDER — FLUTICASONE PROPIONATE AND SALMETEROL 50; 250 UG/1; UG/1
1 POWDER ORAL; RESPIRATORY (INHALATION)
Qty: 0 | Refills: 0 | Status: DISCONTINUED | OUTPATIENT
Start: 2017-01-30 | End: 2017-02-06

## 2017-01-30 RX ORDER — SIMVASTATIN 20 MG/1
10 TABLET, FILM COATED ORAL AT BEDTIME
Qty: 0 | Refills: 0 | Status: DISCONTINUED | OUTPATIENT
Start: 2017-01-30 | End: 2017-01-30

## 2017-01-30 RX ORDER — DILTIAZEM HCL 120 MG
120 CAPSULE, EXT RELEASE 24 HR ORAL DAILY
Qty: 0 | Refills: 0 | Status: DISCONTINUED | OUTPATIENT
Start: 2017-01-30 | End: 2017-02-06

## 2017-01-30 RX ORDER — ACETAMINOPHEN 500 MG
650 TABLET ORAL EVERY 6 HOURS
Qty: 0 | Refills: 0 | Status: DISCONTINUED | OUTPATIENT
Start: 2017-01-30 | End: 2017-02-01

## 2017-01-30 RX ORDER — IPRATROPIUM/ALBUTEROL SULFATE 18-103MCG
3 AEROSOL WITH ADAPTER (GRAM) INHALATION EVERY 6 HOURS
Qty: 0 | Refills: 0 | Status: DISCONTINUED | OUTPATIENT
Start: 2017-01-30 | End: 2017-02-06

## 2017-01-30 RX ORDER — VANCOMYCIN HCL 1 G
1250 VIAL (EA) INTRAVENOUS EVERY 12 HOURS
Qty: 0 | Refills: 0 | Status: DISCONTINUED | OUTPATIENT
Start: 2017-01-30 | End: 2017-01-30

## 2017-01-30 RX ORDER — HEPARIN SODIUM 5000 [USP'U]/ML
1500 INJECTION INTRAVENOUS; SUBCUTANEOUS
Qty: 25000 | Refills: 0 | Status: DISCONTINUED | OUTPATIENT
Start: 2017-01-30 | End: 2017-01-31

## 2017-01-30 RX ADMIN — Medication 3 MILLIGRAM(S): at 22:29

## 2017-01-30 RX ADMIN — FLUTICASONE PROPIONATE AND SALMETEROL 1 DOSE(S): 50; 250 POWDER ORAL; RESPIRATORY (INHALATION) at 21:24

## 2017-01-30 RX ADMIN — PIPERACILLIN AND TAZOBACTAM 200 GRAM(S): 4; .5 INJECTION, POWDER, LYOPHILIZED, FOR SOLUTION INTRAVENOUS at 04:11

## 2017-01-30 RX ADMIN — Medication 75 MILLIGRAM(S): at 18:32

## 2017-01-30 RX ADMIN — Medication 650 MILLIGRAM(S): at 21:25

## 2017-01-30 RX ADMIN — Medication 120 MILLIGRAM(S): at 06:14

## 2017-01-30 RX ADMIN — Medication 3 MILLILITER(S): at 02:28

## 2017-01-30 RX ADMIN — LISINOPRIL 20 MILLIGRAM(S): 2.5 TABLET ORAL at 05:44

## 2017-01-30 RX ADMIN — PANTOPRAZOLE SODIUM 40 MILLIGRAM(S): 20 TABLET, DELAYED RELEASE ORAL at 06:14

## 2017-01-30 RX ADMIN — HEPARIN SODIUM 14 UNIT(S)/HR: 5000 INJECTION INTRAVENOUS; SUBCUTANEOUS at 14:24

## 2017-01-30 RX ADMIN — Medication 650 MILLIGRAM(S): at 01:30

## 2017-01-30 RX ADMIN — Medication 75 MILLIGRAM(S): at 10:24

## 2017-01-30 RX ADMIN — Medication 325 MILLIGRAM(S): at 05:45

## 2017-01-30 RX ADMIN — HEPARIN SODIUM 14 UNIT(S)/HR: 5000 INJECTION INTRAVENOUS; SUBCUTANEOUS at 14:19

## 2017-01-30 RX ADMIN — FLUTICASONE PROPIONATE AND SALMETEROL 1 DOSE(S): 50; 250 POWDER ORAL; RESPIRATORY (INHALATION) at 10:24

## 2017-01-30 RX ADMIN — Medication 650 MILLIGRAM(S): at 22:32

## 2017-01-30 NOTE — H&P ADULT. - GASTROINTESTINAL DETAILS
no distention/bowel sounds normal/no rebound tenderness/no masses palpable/no rigidity/soft/no guarding

## 2017-01-30 NOTE — PROGRESS NOTE ADULT - PROBLEM SELECTOR PLAN 1
As described above. No longer meeting SIRS criteria on RMF, comfortable at bedside. No respiratory distress. Flank pain responded well to tylenol.  - s/p vanc/zosyn in ED, continue for now  - start maintenance fluids overnight IV NS  - fu bl cx  - fu surgery recs  - fu RVP

## 2017-01-30 NOTE — PROGRESS NOTE ADULT - PROBLEM SELECTOR PLAN 6
Hgb 11 range, at baseline. Normocytic. Likely ACD. Hematoma may be expanding based on CT scan imaging. No other evidence new bleed.  - iron studies  - trend CBC    PPx. holding AC and SCDs for now in setting of possible bleed and possible DVTs, start these items as appropriate. nexium.    FEN. IVF as above. replete lytes prn K>4, Mg>2. DASH diet.    Dispo. Pending clinical improvement.

## 2017-01-30 NOTE — H&P ADULT. - PROBLEM SELECTOR PLAN 1
As described above. No longer meeting SIRS criteria on RMF, comfortable at bedside. No respiratory distress. Flank pain responded well to tylenol.  - s/p vanc/zosyn in ED  - start maintenance fluids overnight IV NS  - fu bl cx  - fu surgery recs As described above. No longer meeting SIRS criteria on RMF, comfortable at bedside. No respiratory distress. Flank pain responded well to tylenol.  - s/p vanc/zosyn in ED, continue for now  - start maintenance fluids overnight IV NS  - fu bl cx  - fu surgery recs  - fu RVP

## 2017-01-30 NOTE — PROGRESS NOTE ADULT - PROBLEM SELECTOR PLAN 2
As described above. Hgb stable at baseline, no evidence of bleed on exam, no ongoing tachycardia. CT scan read as possible increased size of splenic hematoma.  - plan to transfer patient to surgery tele for close monitoring and start heparin gtt  - fu LE dopplers for DVT

## 2017-01-30 NOTE — PROGRESS NOTE ADULT - PROBLEM SELECTOR PLAN 5
MI and angiplasty 10 years ago. Pt reports MI cocaine induced. No substernal CP or HART.  - holding ASA in setting of possible expanding hematoma

## 2017-01-30 NOTE — PROGRESS NOTE ADULT - PROBLEM SELECTOR PLAN 2
As described above. Hgb stable at baseline, no evidence of bleed on exam, no ongoing tachycardia. CT scan read as possible increased size of splenic hematoma, caution with starting AC now.  - monitor pt overnight and trend hgb, if no further evidence bleed than start AC in AM  - fu LE dopplers for DVT    attending addendum: NO AC for now, consult pulmonary team

## 2017-01-30 NOTE — PROGRESS NOTE ADULT - SUBJECTIVE AND OBJECTIVE BOX
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 Wife has had cough and runny nose. Pt came to ED as he reports his DC paperwork indicated he should RTC if he measured elevated temp at home. No runny nose or sinus pressure. No chills. No CP/dyruria/N/vomiting/urinary or bowel habit changes, no constipation/diarrhea. Was DC'ed with LUQ pain that improved (and remains improved from DC). Now experiencing new/changed L flank and back back. LUQ anterior abd pain is much better than during past admission. Pain worse with movement and inhalation. No epigastric pain. +intermittant mild HA. +mild SOB when experiencing pain, otherwise no SOB, no wheezing. Pt reports no prior dx asthma or COPD. No new leg swelling or pain. Has been relatively immobile at home while convalscing but feeling much better and was planning [place].    In ED: 100.7, 109 to 86, 142/89, 20 , 96% RA. Hgb 11 range, at baseline. CT PE protocol and A/P: L subsegmental PE, no R heart strain, L pl effusion, possible enlarged hematoma vs infection vs hematoma resolution. Pancreatic haziness, recs check lipase. Received vanc/zosyn, tylenol.  Surgery cx in ED: no acute intervention.    OBJECTIVE:    VITAL SIGNS:  ICU Vital Signs Last 24 Hrs  T(C): 37.2, Max: 38.2 (01-29 @ 16:23)  T(F): 98.9, Max: 100.7 (01-29 @ 16:23)  HR: 92 (81 - 109)  BP: 148/62 (106/69 - 166/84)  BP(mean): --  ABP: --  ABP(mean): --  RR: 18 (17 - 20)  SpO2: 98% (94% - 98%)        I & Os for current day (as of 01-30 @ 14:06)  =============================================  IN: 490 ml / OUT: 750 ml / NET: -260 ml    CAPILLARY BLOOD GLUCOSE      PHYSICAL EXAM:    PHYSICAL EXAM:  General: WDWN ; NAD  HEENT: NC/AT; PERRL, anicteric sclera  Neck: supple, no JVD  Respiratory: CTA B/L; no W/R/R  Cardiovascular: +S1/S2; RRR; no M/R/G  Gastrointestinal: soft, NT/ND; +BS x4  Extremities: WWP; 2+ peripheral pulses B/L; no LE edema  Skin: normal color and turgor; no rash  Neurological:     MEDICATIONS:  MEDICATIONS  (STANDING):  fluticasone / salmeterol 100-50 MICROgram(s) Diskus 1Dose(s) Inhalation two times a day  lisinopril 20milliGRAM(s) Oral daily  diltiazem   CD 120milliGRAM(s) Oral daily  pantoprazole    Tablet 40milliGRAM(s) Oral before breakfast  oseltamivir 75milliGRAM(s) Oral two times a day  heparin  Infusion 1400Unit(s)/Hr IV Continuous <Continuous>    MEDICATIONS  (PRN):  acetaminophen   Tablet 650milliGRAM(s) Oral every 6 hours PRN For Temp greater than 38 C (100.4 F)  acetaminophen   Tablet. 650milliGRAM(s) Oral every 6 hours PRN Mild Pain (1 - 3)  nicotine  Polacrilex Gum 2milliGRAM(s) Oral two times a day PRN agitation  ALBUTerol/ipratropium for Nebulization 3milliLiter(s) Nebulizer every 6 hours PRN Shortness of Breath and/or Wheezing  docusate sodium 100milliGRAM(s) Oral two times a day PRN Constipation      ALLERGIES:  Allergies    No Known Allergies    Intolerances        LABS:                        11.7   9.0   )-----------( 367      ( 30 Jan 2017 07:51 )             35.5     30 Jan 2017 07:51    135    |  100    |  8      ----------------------------<  101    4.0     |  26     |  0.76     Ca    8.8        30 Jan 2017 07:51  Mg     2.2       30 Jan 2017 07:51    TPro  7.5    /  Alb  3.0    /  TBili  0.4    /  DBili  x      /  AST  36     /  ALT  30     /  AlkPhos  111    29 Jan 2017 17:35    LIVER FUNCTIONS - ( 29 Jan 2017 17:35 )  Alb: 3.0 g/dL / Pro: 7.5 g/dL / ALK PHOS: 111 U/L / ALT: 30 U/L / AST: 36 U/L / GGT: x           PT/INR - ( 30 Jan 2017 07:51 )   PT: 13.4 sec;   INR: 1.20          PTT - ( 30 Jan 2017 07:51 )  PTT:32.4 sec  Urinalysis Basic - ( 29 Jan 2017 23:35 )    Color: Yellow / Appearance: Clear / SG: <=1.005 / pH: x  Gluc: x / Ketone: NEGATIVE  / Bili: NEGATIVE / Urobili: 0.2 E.U./dL   Blood: x / Protein: NEGATIVE mg/dL / Nitrite: NEGATIVE   Leuk Esterase: NEGATIVE / RBC: < 5 /HPF / WBC < 5 /HPF   Sq Epi: x / Non Sq Epi: Rare /HPF / Bacteria: Present /HPF PGY-1 Transfer Note:    HPI/Hospital Course  52 yo M PMH polysubstance use (past heavy liquor x years, switched to 1 beer per day only about 1 year ago, reports no longer heavy drinker, no other current drugs, remote cocaine use), HLD, 40 pack year smoker, quit 2 weeks ago, CAD (angioplasty 10 years ago, pt reports MI was cocaine induced), HTN, recent 1/23/17 [facility] DC splenic laceration grade 2 (hematoma was managed non operatively on surgery service), etoh induced pancreatitis 1 year ago tx at [facility], sting ray wound to LLE, BL LE numbness, presents with home fever 102.5, cough, malaise x 2 days. Now experiencing new/changed L flank and back pain.  No new leg swelling or pain.  In ED: 100.7, 109 to 86, 142/89, 20 , 96% RA. Hgb 11 range, at baseline. CT PE protocol and CT A/P: L subsegmental PE, no R heart strain, L pl effusion, possible enlarged hematoma. Pancreatic haziness, recs check lipase. Received vanc/zosyn, tylenol. Patient's RVP +influenza, started on Tamiflu and abx discontinued. Surgery consulted regarding anticoagulation in the setting of enlarging splenic hematoma, and the decision was made to transfer the patient to surgical telemetry.     SUBJECTIVE: Patient states he has L side/flank/back pain but otherwise has no complaints today.    OBJECTIVE:    VITAL SIGNS:  ICU Vital Signs Last 24 Hrs  T(C): 37.2, Max: 38.2 (01-29 @ 16:23)  T(F): 98.9, Max: 100.7 (01-29 @ 16:23)  HR: 92 (81 - 109)  BP: 148/62 (106/69 - 166/84)  BP(mean): --  ABP: --  ABP(mean): --  RR: 18 (17 - 20)  SpO2: 98% (94% - 98%)        I & Os for current day (as of 01-30 @ 14:06)  =============================================  IN: 490 ml / OUT: 750 ml / NET: -260 ml    CAPILLARY BLOOD GLUCOSE      PHYSICAL EXAM:    PHYSICAL EXAM:  General: WDWN ; NAD sitting in bed   HEENT: NC/AT; PERRL, anicteric sclera  Neck: supple, no JVD  Respiratory: decreased breath sounds at left base, clear in other lung fields   Cardiovascular: +S1/S2; RRR; no M/R/G  Gastrointestinal: soft, NT/ND; +BS   Extremities: WWP; 2+ peripheral pulses B/L; no LE edema, no calf tenderness     MEDICATIONS:  MEDICATIONS  (STANDING):  fluticasone / salmeterol 100-50 MICROgram(s) Diskus 1Dose(s) Inhalation two times a day  lisinopril 20milliGRAM(s) Oral daily  diltiazem   CD 120milliGRAM(s) Oral daily  pantoprazole    Tablet 40milliGRAM(s) Oral before breakfast  oseltamivir 75milliGRAM(s) Oral two times a day  heparin  Infusion 1400Unit(s)/Hr IV Continuous <Continuous>    MEDICATIONS  (PRN):  acetaminophen   Tablet 650milliGRAM(s) Oral every 6 hours PRN For Temp greater than 38 C (100.4 F)  acetaminophen   Tablet. 650milliGRAM(s) Oral every 6 hours PRN Mild Pain (1 - 3)  nicotine  Polacrilex Gum 2milliGRAM(s) Oral two times a day PRN agitation  ALBUTerol/ipratropium for Nebulization 3milliLiter(s) Nebulizer every 6 hours PRN Shortness of Breath and/or Wheezing  docusate sodium 100milliGRAM(s) Oral two times a day PRN Constipation      ALLERGIES:  Allergies    No Known Allergies    Intolerances        LABS:                        11.7   9.0   )-----------( 367      ( 30 Jan 2017 07:51 )             35.5     30 Jan 2017 07:51    135    |  100    |  8      ----------------------------<  101    4.0     |  26     |  0.76     Ca    8.8        30 Jan 2017 07:51  Mg     2.2       30 Jan 2017 07:51    TPro  7.5    /  Alb  3.0    /  TBili  0.4    /  DBili  x      /  AST  36     /  ALT  30     /  AlkPhos  111    29 Jan 2017 17:35    LIVER FUNCTIONS - ( 29 Jan 2017 17:35 )  Alb: 3.0 g/dL / Pro: 7.5 g/dL / ALK PHOS: 111 U/L / ALT: 30 U/L / AST: 36 U/L / GGT: x           PT/INR - ( 30 Jan 2017 07:51 )   PT: 13.4 sec;   INR: 1.20          PTT - ( 30 Jan 2017 07:51 )  PTT:32.4 sec  Urinalysis Basic - ( 29 Jan 2017 23:35 )    Color: Yellow / Appearance: Clear / SG: <=1.005 / pH: x  Gluc: x / Ketone: NEGATIVE  / Bili: NEGATIVE / Urobili: 0.2 E.U./dL   Blood: x / Protein: NEGATIVE mg/dL / Nitrite: NEGATIVE   Leuk Esterase: NEGATIVE / RBC: < 5 /HPF / WBC < 5 /HPF   Sq Epi: x / Non Sq Epi: Rare /HPF / Bacteria: Present /HPF

## 2017-01-30 NOTE — PROGRESS NOTE ADULT - PROBLEM SELECTOR PLAN 3
Recent admit for grade 2 laceration. Unclear if increased in size or resolving based on imaging. Clinically pt improving from recent [facility] 1/23/17 DC.  - monitor as above  - fu surgery recs, no planned intervention when consulted in ED

## 2017-01-30 NOTE — PROGRESS NOTE ADULT - SUBJECTIVE AND OBJECTIVE BOX
Readmitted with fever after being in the hospital for spont. spleen rupture with contained hematoma. His work up showed L sided sub segmental PE, perisplenic abscess. No signs of bleeding. Plan heparinize the PT, IR drainage of the abscess in 24 hours, monitor H/H and VS, transfer to monitored floor, cont IV ABX

## 2017-01-30 NOTE — H&P ADULT. - ATTENDING COMMENTS
pt seen and examined on 1/30/2017  reviewed h&p, ekg, vs, labs, CT chest angio  pt recently dcd from [facility] for splenic laceration re-admitted for  fevers, dyspnea, found to have moderate L pleural effusion and possible  subsegmental PE at segmental branch of LLL ; pt also w/ increase in subcapsular collection at spleen on CT a/p     PE  gen: awake, alert, NAD   heent: dry MM, no JVD  cvs: s1s2  lungs: decreased L basilar breath sounds, otherwise cta    abd: no abdominal tenderness    legs: Left distal lower leg scar (sting ray attack 2 years ago); left lower leg appears to larger than right.     a/p:   1. SIRS/ L pleural effusion: started on vancomycin/zosyn; follow up ctxs and RVP; consult pulmonary   2. subsegmental PE of segmental branch at LLL: limited study, hold off on AC as pt w/ splenic laceration; ICU consult if worsening sxs, and/or hemodynamic instability ; dopplers of lower ext pending pt seen and examined on 1/30/2017  reviewed h&p, ekg, vs, labs, CT chest angio  pt recently dcd from [facility] for splenic laceration re-admitted for  fevers, dyspnea, found to have moderate L pleural effusion and possible  subsegmental PE at segmental branch of LLL ; pt also w/ increase in subcapsular collection at spleen on CT a/p     PE  gen: awake, alert, NAD   heent: dry MM, no JVD  cvs: s1s2  lungs: decreased L basilar breath sounds, otherwise cta    abd: no abdominal tenderness    legs: Left distal lower leg scar (sting ray attack 2 years ago); left lower leg appears to larger than right.     a/p:   1. SIRS/ L pleural effusion: started on vancomycin/zosyn; follow up ctxs and RVP; consult pulmonary   2. subsegmental PE of segmental branch at LLL: limited study, hold off on AC as pt w/ splenic laceration; hold ASA;  ICU consult if worsening sxs, and/or hemodynamic instability ; dopplers of lower ext pending

## 2017-01-30 NOTE — H&P ADULT. - PROBLEM SELECTOR PLAN 3
Recent admit for grade 2 laceration. Unclear if increased in size or resolving based on imaging. Clinically pt improving from recent [facility] 1/23/17 DC.  - monitor as above  - fu surgery recs, no planned intervention when consulted in ED

## 2017-01-30 NOTE — H&P ADULT. - PROBLEM SELECTOR PLAN 2
As described above. Hgb stable at baseline, no evidence of bleed on exam, no ongoing tachycardia. CT scan read as possible increased size of splenic hematoma, caution with starting AC now.  - monitor pt overnight and trend hgb, if no further evidence bleed than start AC in AM  - fu LE dopplers for DVT As described above. Hgb stable at baseline, no evidence of bleed on exam, no ongoing tachycardia. CT scan read as possible increased size of splenic hematoma, caution with starting AC now. Overnight pt had episode quarter sized hemoptysis, did not recur, VSS, NAD during or afterwards.  - monitor pt overnight and trend hgb, if no further evidence bleed than start AC in AM  - fu LE dopplers for DVT As described above. Hgb stable at baseline, no evidence of bleed on exam, no ongoing tachycardia. CT scan read as possible increased size of splenic hematoma, caution with starting AC now.  - monitor pt overnight and trend hgb, if no further evidence bleed than start AC in AM  - fu LE dopplers for DVT    attending addendum: NO AC for now, consult pulmonary team

## 2017-01-30 NOTE — PROGRESS NOTE ADULT - PROBLEM SELECTOR PLAN 1
Patient w/ sepsis 2/2 influenza present on admission, no resolved, patient no longer tachycardic and is currently afebrile.   -droplet precautions  -Tamiflu  -discontinue antibiotics   -Tylenol prn fever

## 2017-01-30 NOTE — H&P ADULT. - SUBSTANCE USE COMMENT, PROFILE
past cocaine use, 40 pack year tobacco, quit 2 weeks ago, past etoh abuse, currently drinks 1 beer daily

## 2017-01-30 NOTE — PROGRESS NOTE ADULT - PROBLEM SELECTOR PLAN 3
Recent admit for grade 2 laceration. Unclear if increased in size or resolving based on imaging.   - transfer

## 2017-01-30 NOTE — H&P ADULT. - PROBLEM SELECTOR PLAN 6
Hgb 11 range, at baseline. Normocytic. Likely ACD. Hematoma may be expanding based on CT scan imaging. No other evidence new bleed.  - iron studies  - trend CBC    PPx. SCDs for now in setting of possible bleed. nexium    FEN. IVF as above. replete lytes prn K>4, Mg>2. DASH diet.    Dispo. Pending clinical improvement. Hgb 11 range, at baseline. Normocytic. Likely ACD. Hematoma may be expanding based on CT scan imaging. No other evidence new bleed.  - iron studies  - trend CBC    PPx. holding AC and SCDs for now in setting of possible bleed and possible DVTs, start these items as appropriate. nexium.    FEN. IVF as above. replete lytes prn K>4, Mg>2. DASH diet.    Dispo. Pending clinical improvement.

## 2017-01-30 NOTE — H&P ADULT. - PROBLEM SELECTOR PLAN 5
MI and angiplasty 10 years ago. Pt reports MI cocaine induced. No substernal CP or HART.  - hold ASA in setting of possible expanding hematoma

## 2017-01-30 NOTE — H&P ADULT. - ASSESSMENT
50 yo M PMH polysubstance use (past heavy liquor x years, switched to 1 beer per day only about 1 year ago, reports no longer heavy drinker, no other current drugs, remote cocaine use), HLD, 40 pack year smoker, quit 2 weeks ago, CAD (angioplasty 10 years ago, pt reports MI was cocaine induced), HTN, recent 1/23/17 [facility] DC splenic laceration grade 2 (hematoma was managed non operatively on surgery service), etoh induced pancreatitis 1 year ago tx at [facility], sting ray wound to LLE, BL LE numbness, chronic presents with home fever 102.5, cough, malaise x 2 days. CT PE protocol and A/P: L subsegmental PE, no R heart strain, L pl effusion, possible enlarged hematoma vs infection vs hematoma resolution. Surgery cx in ED: no acute intervention. Initially meeting SIRS criteria, possible sources include HCAP vs infected hematoma vs fever from hematoma without infection vs viral URI. NAD on exam, comfortable at bedside.

## 2017-01-30 NOTE — H&P ADULT. - PROBLEM SELECTOR PLAN 4
No evidence intoxication or withdrawal on exam.  - fu u tox  - monitor signs of withdrawal  - nicotine gum

## 2017-01-30 NOTE — H&P ADULT. - PMH
Angina pectoris  Anginal pain  Atherosclerosis of coronary artery  CAD (coronary artery disease)  Essential hypertension  HTN (hypertension)  Nondependent alcohol abuse  Alcohol abuse  Pancreatitis    Polysubstance abuse

## 2017-01-30 NOTE — PROGRESS NOTE ADULT - ASSESSMENT
52 yo M PMH polysubstance use, HLD, 40 pack year smoker, quit 2 weeks ago, CAD (angioplasty 10 years ago, pt reports MI was cocaine induced), HTN, recent 1/23/17 [facility] DC splenic laceration grade 2 (hematoma was managed non operatively on surgery service), etoh induced pancreatitis 1 year ago tx at [facility], sting ray wound to LLE, BL LE numbness, chronic presents with home fever 102.5, cough, malaise x 2 days. CT PE protocol and A/P: L subsegmental PE, no R heart strain, L pl effusion, possible enlarged hematoma vs infection vs hematoma resolution. Surgery cx in ED: no acute intervention. Initially meeting SIRS criteria, possible sources include HCAP vs infected hematoma vs fever from hematoma without infection vs viral URI. NAD on exam, comfortable at bedside.

## 2017-01-30 NOTE — H&P ADULT. - RS GEN PE MLT RESP DETAILS PC
scattered BL end expiratory wheezes/respirations non-labored/airway patent/good air movement/breath sounds equal/no rales/no rhonchi

## 2017-01-30 NOTE — PROGRESS NOTE ADULT - PROBLEM SELECTOR PLAN 6
Hgb 11 range, at baseline. Normocytic. Likely ACD. Hematoma may be expanding based on CT scan imaging. No other evidence new bleed.  - trend CBC    PPx. holding AC and SCDs for now in setting of possible bleed and possible DVTs, start these items as appropriate. nexium.    FEN. IVF as above. replete lytes prn K>4, Mg>2. DASH diet.    Dispo. Pending clinical improvement.

## 2017-01-30 NOTE — PROGRESS NOTE ADULT - SUBJECTIVE AND OBJECTIVE BOX
SUBJECTIVE: Pt seen and examined at bedside. No overnight events. No c/n/v. +BM/+Flatus    Vital Signs Last 24 Hrs  T(C): 38.1, Max: 38.2 (01-29 @ 16:23)  T(F): 100.6, Max: 100.7 (01-29 @ 16:23)  HR: 81 (81 - 109)  BP: 106/69 (106/69 - 166/84)  BP(mean): --  RR: 18 (17 - 20)  SpO2: 94% (94% - 98%)    PHYSICAL EXAM    Gen: NAD  Abd: Soft, ND, NT. Some minimal TTP in Upper lateral Left flank    LABS:                        11.4   9.3   )-----------( 347      ( 29 Jan 2017 17:35 )             34.5     29 Jan 2017 17:35    132    |  96     |  7      ----------------------------<  109    4.1     |  28     |  0.82     Ca    8.8        29 Jan 2017 17:35    TPro  7.5    /  Alb  3.0    /  TBili  0.4    /  DBili  x      /  AST  36     /  ALT  30     /  AlkPhos  111    29 Jan 2017 17:35      Urinalysis Basic - ( 29 Jan 2017 23:35 )    Color: Yellow / Appearance: Clear / SG: <=1.005 / pH: x  Gluc: x / Ketone: NEGATIVE  / Bili: NEGATIVE / Urobili: 0.2 E.U./dL   Blood: x / Protein: NEGATIVE mg/dL / Nitrite: NEGATIVE   Leuk Esterase: NEGATIVE / RBC: < 5 /HPF / WBC < 5 /HPF   Sq Epi: x / Non Sq Epi: Rare /HPF / Bacteria: Present /HPF        ASSESSMENT AND PLAN  51M hx CAD, MI s/p angioplasty 10 years on ASA 81, HTN, and pancreatitis 2/2 to EtOH abuse admitted to [facility] with fevers at home, possibly stemming from lung pathology seen on CT scan. Splenic hematoma appears on CT to be less likely the source of fevers, however will continue to monitor  -No acute surgical intervention at this time  -will f/u LE Dopplers  -Continue Incentive spirometry and pulmonary toilet  -Further management per primary team  -D/W Dr. Hu

## 2017-01-30 NOTE — PROGRESS NOTE ADULT - ASSESSMENT
52 yo M PMH polysubstance use, HLD, 40 pack year smoker, quit 2 weeks ago, CAD (angioplasty 10 years ago, pt reports MI was cocaine induced), HTN, recent 1/23/17 [facility] DC splenic laceration grade 2 (managed non operatively on surgery service), etoh induced pancreatitis 1 year ago tx at [facility], presents with home fever 102.5, cough, malaise x 2 days, found to have  L subsegmental PE, no R heart strain, L pl effusion, possible enlarged hematoma, and influenza, admitted initially to medicine but being transferred to surgery tele for further management.

## 2017-01-30 NOTE — PROGRESS NOTE ADULT - SUBJECTIVE AND OBJECTIVE BOX
INTERVAL HPI/OVERNIGHT EVENTS:    SUBJECTIVE: Patient seen and examined at bedside.    OBJECTIVE:    VITAL SIGNS:  ICU Vital Signs Last 24 Hrs  T(C): 38.1, Max: 38.2 (01-29 @ 16:23)  T(F): 100.6, Max: 100.7 (01-29 @ 16:23)  HR: 81 (81 - 109)  BP: 106/69 (106/69 - 166/84)  BP(mean): --  ABP: --  ABP(mean): --  RR: 18 (17 - 20)  SpO2: 94% (94% - 98%)        I & Os for current day (as of 01-30 @ 08:58)  =============================================  IN: 490 ml / OUT: 750 ml / NET: -260 ml    CAPILLARY BLOOD GLUCOSE      PHYSICAL EXAM:    PHYSICAL EXAM:  General: WDWN ; NAD  HEENT: NC/AT; PERRL, anicteric sclera  Neck: supple, no JVD  Respiratory: CTA B/L; no W/R/R  Cardiovascular: +S1/S2; RRR; no M/R/G  Gastrointestinal: soft, NT/ND; +BS x4  Extremities: WWP; 2+ peripheral pulses B/L; no LE edema  Skin: normal color and turgor; no rash  Neurological:     MEDICATIONS:  MEDICATIONS  (STANDING):  fluticasone / salmeterol 100-50 MICROgram(s) Diskus 1Dose(s) Inhalation two times a day  lisinopril 20milliGRAM(s) Oral daily  diltiazem   CD 120milliGRAM(s) Oral daily  pantoprazole    Tablet 40milliGRAM(s) Oral before breakfast  oseltamivir 75milliGRAM(s) Oral two times a day    MEDICATIONS  (PRN):  acetaminophen   Tablet 650milliGRAM(s) Oral every 6 hours PRN For Temp greater than 38 C (100.4 F)  acetaminophen   Tablet. 650milliGRAM(s) Oral every 6 hours PRN Mild Pain (1 - 3)  nicotine  Polacrilex Gum 2milliGRAM(s) Oral two times a day PRN agitation  ALBUTerol/ipratropium for Nebulization 3milliLiter(s) Nebulizer every 6 hours PRN Shortness of Breath and/or Wheezing  docusate sodium 100milliGRAM(s) Oral two times a day PRN Constipation      ALLERGIES:  Allergies    No Known Allergies    Intolerances        LABS:                        11.7   9.0   )-----------( 367      ( 30 Jan 2017 07:51 )             35.5     30 Jan 2017 07:51    135    |  100    |  8      ----------------------------<  101    4.0     |  26     |  0.76     Ca    8.8        30 Jan 2017 07:51  Mg     2.2       30 Jan 2017 07:51    TPro  7.5    /  Alb  3.0    /  TBili  0.4    /  DBili  x      /  AST  36     /  ALT  30     /  AlkPhos  111    29 Jan 2017 17:35    LIVER FUNCTIONS - ( 29 Jan 2017 17:35 )  Alb: 3.0 g/dL / Pro: 7.5 g/dL / ALK PHOS: 111 U/L / ALT: 30 U/L / AST: 36 U/L / GGT: x           PT/INR - ( 30 Jan 2017 07:51 )   PT: 13.4 sec;   INR: 1.20          PTT - ( 30 Jan 2017 07:51 )  PTT:32.4 sec  Urinalysis Basic - ( 29 Jan 2017 23:35 )    Color: Yellow / Appearance: Clear / SG: <=1.005 / pH: x  Gluc: x / Ketone: NEGATIVE  / Bili: NEGATIVE / Urobili: 0.2 E.U./dL   Blood: x / Protein: NEGATIVE mg/dL / Nitrite: NEGATIVE   Leuk Esterase: NEGATIVE / RBC: < 5 /HPF / WBC < 5 /HPF   Sq Epi: x / Non Sq Epi: Rare /HPF / Bacteria: Present /HPF            RADIOLOGY & ADDITIONAL TESTS:   CT A/P  Since the prior study of 1/20/2017, there has been interval increase in   the size of a subcapsular splenic collection tracking along the left   paracolic gutter. While the contents of this collection of decreased   density, there is new mild rim enhancement and slightly increased   stranding of the mesenteric fat surrounding the collection. This may   represent resolution although superinfection is not excluded.    There is a new small left-sided pleural effusion and new bibasilar   atelectasis.    CT PE Protocol   there appears to be small pulmonary emboli within the   segmental branches to the left lower lobe. No evidence of right heart   strain.    New moderate left pleural effusion with new bibasilar atelectasis.

## 2017-01-30 NOTE — H&P ADULT. - HISTORY OF PRESENT ILLNESS
52 yo M PMH polysubstance use (past heavy liquor x years, switched to 1 beer per day only about 1 year ago, reports no longer heavy drinker, no other current drugs, remote cocaine use), HLD, 40 pack year smoker, quit 2 weeks ago, CAD (angioplasty 10 years ago, pt reports MI was cocaine induced), HTN, recent 1/23/17 [facility] DC splenic laceration grade 2 (hematoma was managed non operatively on surgery service), etoh induced pancreatitis 1 year ago tx at [facility], sting ray wound to LLE, BL LE numbness, chronic presents with home fever 102.5, cough, malaise x 2 days. Wife has had cough and runny nose. Pt came to ED as he reports his DC paperwork indicated he should RTC if he measured elevated temp at home. No runny nose or sinus pressure. No chills. No CP/dyruria/N/vomiting/urinary or bowel habit changes, no constipation/diarrhea. Was DC'ed with LUQ pain that improved (and remains improved from DC). Now experiencing new/changed L flank and back back. LUQ anterior abd pain is much better than during past admission. Pain worse with movement and inhalation. No epigastric pain. +intermittant mild HA. +mild SOB when experiencing pain, otherwise no SOB, no wheezing. Pt reports no prior dx asthma or COPD. No new leg swelling or pain. Has been relatively immobile at home while convalscing but feeling much better and was [place].    In ED: 100.7, 109 to 86, 142/89, 20 , 96% RA. Hgb 11 range, at baseline. CT PE protocol and A/P: L subsegmental PE, no R heart strain, L pl effusion, possible enlarged hematoma vs infection vs hematoma resolution. Pancreatic haziness, recs check lipase. Received vanc/zosyn, tylenol. 52 yo M PMH polysubstance use (past heavy liquor x years, switched to 1 beer per day only about 1 year ago, reports no longer heavy drinker, no other current drugs, remote cocaine use), HLD, 40 pack year smoker, quit 2 weeks ago, CAD (angioplasty 10 years ago, pt reports MI was cocaine induced), HTN, recent 1/23/17 [facility] DC splenic laceration grade 2 (hematoma was managed non operatively on surgery service), etoh induced pancreatitis 1 year ago tx at [facility], sting ray wound to LLE, BL LE numbness, chronic presents with home fever 102.5, cough, malaise x 2 days. Wife has had cough and runny nose. Pt came to ED as he reports his DC paperwork indicated he should RTC if he measured elevated temp at home. No runny nose or sinus pressure. No chills. No CP/dyruria/N/vomiting/urinary or bowel habit changes, no constipation/diarrhea. Was DC'ed with LUQ pain that improved (and remains improved from DC). Now experiencing new/changed L flank and back back. LUQ anterior abd pain is much better than during past admission. Pain worse with movement and inhalation. No epigastric pain. +intermittant mild HA. +mild SOB when experiencing pain, otherwise no SOB, no wheezing. Pt reports no prior dx asthma or COPD. No new leg swelling or pain. Has been relatively immobile at home while convalscing but feeling much better and was planning to go [place].    In ED: 100.7, 109 to 86, 142/89, 20 , 96% RA. Hgb 11 range, at baseline. CT PE protocol and A/P: L subsegmental PE, no R heart strain, L pl effusion, possible enlarged hematoma vs infection vs hematoma resolution. Pancreatic haziness, recs check lipase. Received vanc/zosyn, tylenol.  Surgery cx in ED: no acute intervention.

## 2017-01-31 ENCOUNTER — APPOINTMENT (OUTPATIENT)
Dept: GASTROENTEROLOGY | Facility: HOSPITAL | Age: 52
End: 2017-01-31

## 2017-01-31 LAB
ANION GAP SERPL CALC-SCNC: 7 MMOL/L — LOW (ref 9–16)
APTT BLD: 77.6 SEC — HIGH (ref 27.5–37.4)
APTT BLD: 83.9 SEC — HIGH (ref 27.5–37.4)
BUN SERPL-MCNC: 9 MG/DL — SIGNIFICANT CHANGE UP (ref 7–23)
CALCIUM SERPL-MCNC: 9.2 MG/DL — SIGNIFICANT CHANGE UP (ref 8.5–10.5)
CHLORIDE SERPL-SCNC: 101 MMOL/L — SIGNIFICANT CHANGE UP (ref 96–108)
CO2 SERPL-SCNC: 27 MMOL/L — SIGNIFICANT CHANGE UP (ref 22–31)
CREAT SERPL-MCNC: 0.64 MG/DL — SIGNIFICANT CHANGE UP (ref 0.5–1.3)
CULTURE RESULTS: NO GROWTH — SIGNIFICANT CHANGE UP
GLUCOSE SERPL-MCNC: 110 MG/DL — HIGH (ref 70–99)
HCT VFR BLD CALC: 37 % — LOW (ref 39–50)
HGB BLD-MCNC: 12.5 G/DL — LOW (ref 13–17)
MAGNESIUM SERPL-MCNC: 2.2 MG/DL — SIGNIFICANT CHANGE UP (ref 1.6–2.4)
MCHC RBC-ENTMCNC: 30.9 PG — SIGNIFICANT CHANGE UP (ref 27–34)
MCHC RBC-ENTMCNC: 33.8 G/DL — SIGNIFICANT CHANGE UP (ref 32–36)
MCV RBC AUTO: 91.4 FL — SIGNIFICANT CHANGE UP (ref 80–100)
PHOSPHATE SERPL-MCNC: 4.1 MG/DL — SIGNIFICANT CHANGE UP (ref 2.5–4.5)
PLATELET # BLD AUTO: 355 K/UL — SIGNIFICANT CHANGE UP (ref 150–400)
POTASSIUM SERPL-MCNC: 4.1 MMOL/L — SIGNIFICANT CHANGE UP (ref 3.5–5.3)
POTASSIUM SERPL-SCNC: 4.1 MMOL/L — SIGNIFICANT CHANGE UP (ref 3.5–5.3)
RBC # BLD: 4.05 M/UL — LOW (ref 4.2–5.8)
RBC # FLD: 13.1 % — SIGNIFICANT CHANGE UP (ref 10.3–16.9)
SODIUM SERPL-SCNC: 135 MMOL/L — SIGNIFICANT CHANGE UP (ref 135–145)
SPECIMEN SOURCE: SIGNIFICANT CHANGE UP
WBC # BLD: 10 K/UL — SIGNIFICANT CHANGE UP (ref 3.8–10.5)
WBC # FLD AUTO: 10 K/UL — SIGNIFICANT CHANGE UP (ref 3.8–10.5)

## 2017-01-31 RX ORDER — HEPARIN SODIUM 5000 [USP'U]/ML
1500 INJECTION INTRAVENOUS; SUBCUTANEOUS
Qty: 25000 | Refills: 0 | Status: DISCONTINUED | OUTPATIENT
Start: 2017-01-31 | End: 2017-01-31

## 2017-01-31 RX ORDER — HEPARIN SODIUM 5000 [USP'U]/ML
1450 INJECTION INTRAVENOUS; SUBCUTANEOUS
Qty: 25000 | Refills: 0 | Status: DISCONTINUED | OUTPATIENT
Start: 2017-01-31 | End: 2017-02-01

## 2017-01-31 RX ORDER — HYDROMORPHONE HYDROCHLORIDE 2 MG/ML
0.5 INJECTION INTRAMUSCULAR; INTRAVENOUS; SUBCUTANEOUS ONCE
Qty: 0 | Refills: 0 | Status: DISCONTINUED | OUTPATIENT
Start: 2017-01-31 | End: 2017-01-31

## 2017-01-31 RX ADMIN — HEPARIN SODIUM 14.5 UNIT(S)/HR: 5000 INJECTION INTRAVENOUS; SUBCUTANEOUS at 21:47

## 2017-01-31 RX ADMIN — Medication 75 MILLIGRAM(S): at 18:58

## 2017-01-31 RX ADMIN — PANTOPRAZOLE SODIUM 40 MILLIGRAM(S): 20 TABLET, DELAYED RELEASE ORAL at 06:14

## 2017-01-31 RX ADMIN — LISINOPRIL 20 MILLIGRAM(S): 2.5 TABLET ORAL at 06:14

## 2017-01-31 RX ADMIN — Medication 3 MILLIGRAM(S): at 23:23

## 2017-01-31 RX ADMIN — Medication 120 MILLIGRAM(S): at 06:14

## 2017-01-31 RX ADMIN — Medication 650 MILLIGRAM(S): at 21:55

## 2017-01-31 RX ADMIN — HYDROMORPHONE HYDROCHLORIDE 0.5 MILLIGRAM(S): 2 INJECTION INTRAMUSCULAR; INTRAVENOUS; SUBCUTANEOUS at 20:55

## 2017-01-31 RX ADMIN — Medication 75 MILLIGRAM(S): at 06:14

## 2017-01-31 RX ADMIN — HYDROMORPHONE HYDROCHLORIDE 0.5 MILLIGRAM(S): 2 INJECTION INTRAMUSCULAR; INTRAVENOUS; SUBCUTANEOUS at 21:15

## 2017-01-31 RX ADMIN — FLUTICASONE PROPIONATE AND SALMETEROL 1 DOSE(S): 50; 250 POWDER ORAL; RESPIRATORY (INHALATION) at 10:35

## 2017-01-31 RX ADMIN — Medication 650 MILLIGRAM(S): at 23:00

## 2017-01-31 NOTE — PROGRESS NOTE ADULT - SUBJECTIVE AND OBJECTIVE BOX
Vital Signs Last 24 Hrs  T(C): 37.1, Max: 37.2 (01-30 @ 09:13)  T(F): 98.7, Max: 99 (01-30 @ 21:13)  HR: 82 (78 - 93)  BP: 140/92 (125/79 - 153/95)  BP(mean): 109 (101 - 116)  RR: 16 (16 - 18)  SpO2: 98% (96% - 98%)      SUBJECTIVE: Pt seen and examined at bedside. Pt complains of some mild pain on the left side of his stomach.   SOB:  [ ] YES [x ] NO  Dyspnea: [ ]YES [x ]NO  Chest Discomfort: [ ] YES [x ] NO    Nausea: [ ] YES [x ] NO           Vomiting: [ ] YES [x ] NO  Flatus: [x ] YES [ ] NO             Bowel Movement: [x ] YES [ ] NO  Diarrhea: [ ] YES [x ] NO         Void: [x ]YES [ ]No  Constipation: [ ] YES [x ] NO     Pain (0-10):              Pain Control Adequate: [x ] YES [ ] NO  Wilson: na  NGT: na    PE:  Abd soft, TTP, ND.       I&O's Summary    I & Os for current day (as of 31 Jan 2017 07:38)  =============================================  IN: 206 ml / OUT: 2000 ml / NET: -1794 ml    I&O's Detail    I & Os for current day (as of 31 Jan 2017 07:38)  =============================================  IN:    heparin Infusion: 150 ml    heparin Infusion: 56 ml    Total IN: 206 ml  ---------------------------------------------  OUT:    Voided: 2000 ml    Total OUT: 2000 ml  ---------------------------------------------  Total NET: -1794 ml      MEDICATIONS  (STANDING):  fluticasone / salmeterol 100-50 MICROgram(s) Diskus 1Dose(s) Inhalation two times a day  lisinopril 20milliGRAM(s) Oral daily  diltiazem   CD 120milliGRAM(s) Oral daily  pantoprazole    Tablet 40milliGRAM(s) Oral before breakfast  oseltamivir 75milliGRAM(s) Oral two times a day  heparin  Infusion 1500Unit(s)/Hr IV Continuous <Continuous>    MEDICATIONS  (PRN):  acetaminophen   Tablet 650milliGRAM(s) Oral every 6 hours PRN For Temp greater than 38 C (100.4 F)  acetaminophen   Tablet. 650milliGRAM(s) Oral every 6 hours PRN Mild Pain (1 - 3)  nicotine  Polacrilex Gum 2milliGRAM(s) Oral two times a day PRN agitation  ALBUTerol/ipratropium for Nebulization 3milliLiter(s) Nebulizer every 6 hours PRN Shortness of Breath and/or Wheezing  docusate sodium 100milliGRAM(s) Oral two times a day PRN Constipation  melatonin 3milliGRAM(s) Oral at bedtime PRN Insomnia      LABS:                        11.7   9.5   )-----------( 358      ( 30 Jan 2017 18:32 )             35.1     30 Jan 2017 07:51    135    |  100    |  8      ----------------------------<  101    4.0     |  26     |  0.76     Ca    8.8        30 Jan 2017 07:51  Mg     2.2       30 Jan 2017 07:51    TPro  7.5    /  Alb  3.0    /  TBili  0.4    /  DBili  x      /  AST  36     /  ALT  30     /  AlkPhos  111    29 Jan 2017 17:35    PT/INR - ( 30 Jan 2017 07:51 )   PT: 13.4 sec;   INR: 1.20          PTT - ( 31 Jan 2017 03:10 )  PTT:83.9 sec  Urinalysis Basic - ( 29 Jan 2017 23:35 )    Color: Yellow / Appearance: Clear / SG: <=1.005 / pH: x  Gluc: x / Ketone: NEGATIVE  / Bili: NEGATIVE / Urobili: 0.2 E.U./dL   Blood: x / Protein: NEGATIVE mg/dL / Nitrite: NEGATIVE   Leuk Esterase: NEGATIVE / RBC: < 5 /HPF / WBC < 5 /HPF   Sq Epi: x / Non Sq Epi: Rare /HPF / Bacteria: Present /HPF Vital Signs Last 24 Hrs  T(C): 37.1, Max: 37.2 (01-30 @ 09:13)  T(F): 98.7, Max: 99 (01-30 @ 21:13)  HR: 82 (78 - 93)  BP: 140/92 (125/79 - 153/95)  BP(mean): 109 (101 - 116)  RR: 16 (16 - 18)  SpO2: 98% (96% - 98%)      SUBJECTIVE: Pt seen and examined at bedside. Pt complains of some mild pain on the left side of his stomach.   SOB:  [ ] YES [x ] NO  Dyspnea: [ ]YES [x ]NO  Chest Discomfort: [ ] YES [x ] NO    Nausea: [ ] YES [x ] NO           Vomiting: [ ] YES [x ] NO  Flatus: [x ] YES [ ] NO             Bowel Movement: [x ] YES [ ] NO  Diarrhea: [ ] YES [x ] NO         Void: [x ]YES [ ]No  Constipation: [ ] YES [x ] NO     Pain (0-10):              Pain Control Adequate: [x ] YES [ ] NO  Wilson: na  NGT: na    PE:  Abd soft, TTP, ND.       I&O's Summary    I & Os for current day (as of 31 Jan 2017 07:38)  =============================================  IN: 206 ml / OUT: 2000 ml / NET: -1794 ml    I&O's Detail    I & Os for current day (as of 31 Jan 2017 07:38)  =============================================  IN:    heparin Infusion: 150 ml    heparin Infusion: 56 ml    Total IN: 206 ml  ---------------------------------------------  OUT:    Voided: 2000 ml    Total OUT: 2000 ml  ---------------------------------------------  Total NET: -1794 ml      MEDICATIONS  (STANDING):  fluticasone / salmeterol 100-50 MICROgram(s) Diskus 1Dose(s) Inhalation two times a day  lisinopril 20milliGRAM(s) Oral daily  diltiazem   CD 120milliGRAM(s) Oral daily  pantoprazole    Tablet 40milliGRAM(s) Oral before breakfast  oseltamivir 75milliGRAM(s) Oral two times a day  heparin  Infusion 1500Unit(s)/Hr IV Continuous <Continuous>    MEDICATIONS  (PRN):  acetaminophen   Tablet 650milliGRAM(s) Oral every 6 hours PRN For Temp greater than 38 C (100.4 F)  acetaminophen   Tablet. 650milliGRAM(s) Oral every 6 hours PRN Mild Pain (1 - 3)  nicotine  Polacrilex Gum 2milliGRAM(s) Oral two times a day PRN agitation  ALBUTerol/ipratropium for Nebulization 3milliLiter(s) Nebulizer every 6 hours PRN Shortness of Breath and/or Wheezing  docusate sodium 100milliGRAM(s) Oral two times a day PRN Constipation  melatonin 3milliGRAM(s) Oral at bedtime PRN Insomnia      LABS:                        11.7   9.5   )-----------( 358      ( 30 Jan 2017 18:32 )             35.1     30 Jan 2017 07:51    135    |  100    |  8      ----------------------------<  101    4.0     |  26     |  0.76     Ca    8.8        30 Jan 2017 07:51  Mg     2.2       30 Jan 2017 07:51    TPro  7.5    /  Alb  3.0    /  TBili  0.4    /  DBili  x      /  AST  36     /  ALT  30     /  AlkPhos  111    29 Jan 2017 17:35    PT/INR - ( 30 Jan 2017 07:51 )   PT: 13.4 sec;   INR: 1.20          PTT - ( 31 Jan 2017 03:10 )  PTT:83.9 sec  Urinalysis Basic - ( 29 Jan 2017 23:35 )    Color: Yellow / Appearance: Clear / SG: <=1.005 / pH: x  Gluc: x / Ketone: NEGATIVE  / Bili: NEGATIVE / Urobili: 0.2 E.U./dL   Blood: x / Protein: NEGATIVE mg/dL / Nitrite: NEGATIVE   Leuk Esterase: NEGATIVE / RBC: < 5 /HPF / WBC < 5 /HPF   Sq Epi: x / Non Sq Epi: Rare /HPF / Bacteria: Present /HPF      Radiology: EXAM:  CT ANGIO CHEST PE PROTOCOL IC                           PROCEDURE DATE:  01/29/2017    Quantity of Contrast in Vial in ml: 0 Contrast Used: dual study  Quantity of Contrast Wasted in ml: 0       INTERPRETATION:  Resident preliminary report.     CTA (CT angiography) of the CHEST dated 1/29/2017 7:02 PM    INDICATION: Left upper quadrant and lower chest wall pain and tenderness   to palpation. Fevers. History of splenic hematoma. Assess for pulmonary   embolism.    TECHNIQUE: CT angiography of the chest was performed during bolus   injection of intravenous contrast.  Post-processing including the   production of axial, coronal and sagittal multiplanar reformatted images   and axial and coronal maximum intensity projections (MIPs) was performed.    PRIOR STUDY: CTA chest 10/23/2016. CT abdomen pelvis 1/20/2017    FINDINGS: There is suboptimal opacification of the pulmonary arterial   tree. Within this limitation, there appears to be small pulmonary emboli   within a segmental branchto the left lower lobe (for example series 3   image 75 and series 7 image 67). There is no evidence of right heart   strain. The thoracic aorta is normal in appearance. Heart is normal in   size. Dense coronary artery calcifications. No pericardial effusion is   seen. No mediastinal or axillary lymphadenopathy. There is a prominent   right hilar lymph node measuring up to 1.3 cm in short axis. Mild   gynecomastia noted bilaterally.    There is a new moderate-sized left pleural effusion with associated   passive anterior atelectasis. There are atelectatic changes in the right   lung base. 0.2 cm calcified granuloma in right middle lobe.     Limited evaluation of the upper abdomen demonstrates incomplete   visualization of a subcapsular splenic fluid collection which appears to   have decreased in density on the prior examination of 1/20/2017. There is   slight fullness of the pancreatic tail with haziness surrounding fat.    Evaluation of the osseous structures demonstrates mild degenerative   changes of the spine.      IMPRESSION:   Suboptimal opacification of the pulmonary arterial tree. Within this   limitation, there appears to be small pulmonary emboli within the   segmental branches to the left lower lobe. No evidence of right heart   strain.    New moderate left pleural effusion with new bibasilar atelectasis.    Subcapsular splenic collection. This is better evaluated on the   concomitant CT of the abdomen and pelvis.    Slight fullness of the pancreatic tail with hazinessof the surrounding   mesenteric fat. Clinical correlation with pancreatic enzymes is   recommended to exclude pancreatitis.

## 2017-01-31 NOTE — PROGRESS NOTE ADULT - ASSESSMENT
50 yo M with splenic abscess/ hematoma    IR drainage today  Monitor PTT  Heparin drip  IV ABX  Monitor HH/ VS

## 2017-01-31 NOTE — PROGRESS NOTE ADULT - SUBJECTIVE AND OBJECTIVE BOX
Doing ok, VSS on heparin drip. H/H is stable also. For IR drainage of the abscess, cont supportive care and monitoring

## 2017-02-01 LAB
ANION GAP SERPL CALC-SCNC: 6 MMOL/L — LOW (ref 9–16)
APTT BLD: 51.3 SEC — HIGH (ref 27.5–37.4)
APTT BLD: 55.8 SEC — HIGH (ref 27.5–37.4)
APTT BLD: 66.4 SEC — HIGH (ref 27.5–37.4)
APTT BLD: 87.8 SEC — HIGH (ref 27.5–37.4)
BUN SERPL-MCNC: 16 MG/DL — SIGNIFICANT CHANGE UP (ref 7–23)
CALCIUM SERPL-MCNC: 8.9 MG/DL — SIGNIFICANT CHANGE UP (ref 8.5–10.5)
CHLORIDE SERPL-SCNC: 100 MMOL/L — SIGNIFICANT CHANGE UP (ref 96–108)
CO2 SERPL-SCNC: 30 MMOL/L — SIGNIFICANT CHANGE UP (ref 22–31)
CREAT SERPL-MCNC: 0.83 MG/DL — SIGNIFICANT CHANGE UP (ref 0.5–1.3)
GLUCOSE SERPL-MCNC: 102 MG/DL — HIGH (ref 70–99)
GRAM STN FLD: SIGNIFICANT CHANGE UP
HCT VFR BLD CALC: 37.1 % — LOW (ref 39–50)
HGB BLD-MCNC: 12 G/DL — LOW (ref 13–17)
MAGNESIUM SERPL-MCNC: 2.2 MG/DL — SIGNIFICANT CHANGE UP (ref 1.6–2.4)
MCHC RBC-ENTMCNC: 29.7 PG — SIGNIFICANT CHANGE UP (ref 27–34)
MCHC RBC-ENTMCNC: 32.3 G/DL — SIGNIFICANT CHANGE UP (ref 32–36)
MCV RBC AUTO: 91.8 FL — SIGNIFICANT CHANGE UP (ref 80–100)
PCP SPEC-MCNC: SIGNIFICANT CHANGE UP
PHOSPHATE SERPL-MCNC: 4.5 MG/DL — SIGNIFICANT CHANGE UP (ref 2.5–4.5)
PLATELET # BLD AUTO: 405 K/UL — HIGH (ref 150–400)
POTASSIUM SERPL-MCNC: 3.9 MMOL/L — SIGNIFICANT CHANGE UP (ref 3.5–5.3)
POTASSIUM SERPL-SCNC: 3.9 MMOL/L — SIGNIFICANT CHANGE UP (ref 3.5–5.3)
RBC # BLD: 4.04 M/UL — LOW (ref 4.2–5.8)
RBC # FLD: 12.7 % — SIGNIFICANT CHANGE UP (ref 10.3–16.9)
SODIUM SERPL-SCNC: 136 MMOL/L — SIGNIFICANT CHANGE UP (ref 135–145)
WBC # BLD: 9.8 K/UL — SIGNIFICANT CHANGE UP (ref 3.8–10.5)
WBC # FLD AUTO: 9.8 K/UL — SIGNIFICANT CHANGE UP (ref 3.8–10.5)

## 2017-02-01 RX ORDER — HEPARIN SODIUM 5000 [USP'U]/ML
1650 INJECTION INTRAVENOUS; SUBCUTANEOUS
Qty: 25000 | Refills: 0 | Status: DISCONTINUED | OUTPATIENT
Start: 2017-02-01 | End: 2017-02-01

## 2017-02-01 RX ORDER — HEPARIN SODIUM 5000 [USP'U]/ML
1650 INJECTION INTRAVENOUS; SUBCUTANEOUS
Qty: 25000 | Refills: 0 | Status: DISCONTINUED | OUTPATIENT
Start: 2017-02-01 | End: 2017-02-02

## 2017-02-01 RX ORDER — HEPARIN SODIUM 5000 [USP'U]/ML
1700 INJECTION INTRAVENOUS; SUBCUTANEOUS
Qty: 25000 | Refills: 0 | Status: DISCONTINUED | OUTPATIENT
Start: 2017-02-01 | End: 2017-02-01

## 2017-02-01 RX ORDER — HYDROMORPHONE HYDROCHLORIDE 2 MG/ML
2 INJECTION INTRAMUSCULAR; INTRAVENOUS; SUBCUTANEOUS ONCE
Qty: 0 | Refills: 0 | Status: DISCONTINUED | OUTPATIENT
Start: 2017-02-01 | End: 2017-02-01

## 2017-02-01 RX ORDER — HEPARIN SODIUM 5000 [USP'U]/ML
1500 INJECTION INTRAVENOUS; SUBCUTANEOUS
Qty: 25000 | Refills: 0 | Status: DISCONTINUED | OUTPATIENT
Start: 2017-02-01 | End: 2017-02-01

## 2017-02-01 RX ORDER — POTASSIUM CHLORIDE 20 MEQ
20 PACKET (EA) ORAL ONCE
Qty: 0 | Refills: 0 | Status: COMPLETED | OUTPATIENT
Start: 2017-02-01 | End: 2017-02-01

## 2017-02-01 RX ADMIN — Medication 650 MILLIGRAM(S): at 21:37

## 2017-02-01 RX ADMIN — Medication 75 MILLIGRAM(S): at 18:24

## 2017-02-01 RX ADMIN — Medication 3 MILLIGRAM(S): at 21:36

## 2017-02-01 RX ADMIN — Medication 650 MILLIGRAM(S): at 04:00

## 2017-02-01 RX ADMIN — Medication 20 MILLIEQUIVALENT(S): at 07:42

## 2017-02-01 RX ADMIN — HYDROMORPHONE HYDROCHLORIDE 2 MILLIGRAM(S): 2 INJECTION INTRAMUSCULAR; INTRAVENOUS; SUBCUTANEOUS at 10:44

## 2017-02-01 RX ADMIN — Medication 650 MILLIGRAM(S): at 22:30

## 2017-02-01 RX ADMIN — HEPARIN SODIUM 17 UNIT(S)/HR: 5000 INJECTION INTRAVENOUS; SUBCUTANEOUS at 12:24

## 2017-02-01 RX ADMIN — Medication 120 MILLIGRAM(S): at 06:45

## 2017-02-01 RX ADMIN — HYDROMORPHONE HYDROCHLORIDE 2 MILLIGRAM(S): 2 INJECTION INTRAMUSCULAR; INTRAVENOUS; SUBCUTANEOUS at 11:05

## 2017-02-01 RX ADMIN — Medication 75 MILLIGRAM(S): at 06:46

## 2017-02-01 RX ADMIN — Medication 650 MILLIGRAM(S): at 02:59

## 2017-02-01 RX ADMIN — Medication 650 MILLIGRAM(S): at 16:14

## 2017-02-01 RX ADMIN — PANTOPRAZOLE SODIUM 40 MILLIGRAM(S): 20 TABLET, DELAYED RELEASE ORAL at 06:45

## 2017-02-01 RX ADMIN — Medication 650 MILLIGRAM(S): at 15:47

## 2017-02-01 RX ADMIN — LISINOPRIL 20 MILLIGRAM(S): 2.5 TABLET ORAL at 06:46

## 2017-02-01 NOTE — PROGRESS NOTE ADULT - SUBJECTIVE AND OBJECTIVE BOX
INTERVAL HPI/OVERNIGHT EVENTS: IR drain placed, heparin restartedat 14.5cc/hr, 2am PTT 55, increased to 15    STATUS POST:  IR drainage    POST OPERATIVE DAY #: 1    SUBJECTIVE:  Flatus: [ x] YES [ ] NO             Bowel Movement: [x ] YES [ ] NO  Pain Control Adequate: [x ] YES [ ] NO  Nausea: [ ] YES [ x] NO            Vomiting: [ ] YES [ x] NO  Diarrhea: [ ] YES [x ] NO         Constipation: [ ] YES [x ] NO     Chest Pain: [ ] YES [ x] NO    SOB:  [ ] YES [ x] NO    MEDICATIONS  (STANDING):  fluticasone / salmeterol 100-50 MICROgram(s) Diskus 1Dose(s) Inhalation two times a day  lisinopril 20milliGRAM(s) Oral daily  diltiazem   CD 120milliGRAM(s) Oral daily  pantoprazole    Tablet 40milliGRAM(s) Oral before breakfast  oseltamivir 75milliGRAM(s) Oral two times a day  heparin  Infusion 1500Unit(s)/Hr IV Continuous <Continuous>  potassium chloride   Powder 20milliEquivalent(s) Oral once    MEDICATIONS  (PRN):  acetaminophen   Tablet 650milliGRAM(s) Oral every 6 hours PRN For Temp greater than 38 C (100.4 F)  acetaminophen   Tablet. 650milliGRAM(s) Oral every 6 hours PRN Mild Pain (1 - 3)  nicotine  Polacrilex Gum 2milliGRAM(s) Oral two times a day PRN agitation  ALBUTerol/ipratropium for Nebulization 3milliLiter(s) Nebulizer every 6 hours PRN Shortness of Breath and/or Wheezing  docusate sodium 100milliGRAM(s) Oral two times a day PRN Constipation  melatonin 3milliGRAM(s) Oral at bedtime PRN Insomnia      Vital Signs Last 24 Hrs  T(C): 36.3, Max: 37.3 (01-31 @ 09:16)  T(F): 97.3, Max: 99.1 (01-31 @ 09:16)  HR: 74 (74 - 96)  BP: 135/88 (106/78 - 135/88)  BP(mean): 102 (87 - 110)  RR: 16 (14 - 16)  SpO2: 97% (95% - 97%)    PHYSICAL EXAM:      Constitutional: NAD, A&Ox3    Gastrointestinal: Soft, attp at drain site, ND, No guarding or rebound    Extremities: no peripheral edema    Incision: IR drain in place        I&O's Detail    I & Os for current day (as of 01 Feb 2017 07:13)  =============================================  IN:    heparin Infusion: 132 ml    Total IN: 132 ml  ---------------------------------------------  OUT:    Voided: 2275 ml    Bulb: 47.5 ml    Total OUT: 2322.5 ml  ---------------------------------------------  Total NET: -2190.5 ml      LABS:                        12.0   9.8   )-----------( 405      ( 01 Feb 2017 02:58 )             37.1     01 Feb 2017 02:58    136    |  100    |  16     ----------------------------<  102    3.9     |  30     |  0.83     Ca    8.9        01 Feb 2017 02:58  Phos  4.5       01 Feb 2017 02:58  Mg     2.2       01 Feb 2017 02:58      PT/INR - ( 30 Jan 2017 07:51 )   PT: 13.4 sec;   INR: 1.20          PTT - ( 01 Feb 2017 02:58 )  PTT:55.8 sec        RADIOLOGY & ADDITIONAL STUDIES: INTERVAL HPI/OVERNIGHT EVENTS:    STATUS POST:      POST OPERATIVE DAY #:     SUBJECTIVE:  Flatus: [ ] YES [ ] NO             Bowel Movement: [ ] YES [ ] NO  Pain (0-10):            Pain Control Adequate: [ ] YES [ ] NO  Nausea: [ ] YES [ ] NO            Vomiting: [ ] YES [ ] NO  Diarrhea: [ ] YES [ ] NO         Constipation: [ ] YES [ ] NO     Chest Pain: [ ] YES [ ] NO    SOB:  [ ] YES [ ] NO    MEDICATIONS  (STANDING):  fluticasone / salmeterol 100-50 MICROgram(s) Diskus 1Dose(s) Inhalation two times a day  lisinopril 20milliGRAM(s) Oral daily  diltiazem   CD 120milliGRAM(s) Oral daily  pantoprazole    Tablet 40milliGRAM(s) Oral before breakfast  oseltamivir 75milliGRAM(s) Oral two times a day  heparin  Infusion 1500Unit(s)/Hr IV Continuous <Continuous>  potassium chloride   Powder 20milliEquivalent(s) Oral once    MEDICATIONS  (PRN):  acetaminophen   Tablet 650milliGRAM(s) Oral every 6 hours PRN For Temp greater than 38 C (100.4 F)  acetaminophen   Tablet. 650milliGRAM(s) Oral every 6 hours PRN Mild Pain (1 - 3)  nicotine  Polacrilex Gum 2milliGRAM(s) Oral two times a day PRN agitation  ALBUTerol/ipratropium for Nebulization 3milliLiter(s) Nebulizer every 6 hours PRN Shortness of Breath and/or Wheezing  docusate sodium 100milliGRAM(s) Oral two times a day PRN Constipation  melatonin 3milliGRAM(s) Oral at bedtime PRN Insomnia      Vital Signs Last 24 Hrs  T(C): 36.3, Max: 37.3 (01-31 @ 09:16)  T(F): 97.3, Max: 99.1 (01-31 @ 09:16)  HR: 74 (74 - 96)  BP: 135/88 (106/78 - 135/88)  BP(mean): 102 (87 - 110)  RR: 16 (14 - 16)  SpO2: 97% (95% - 97%)    PHYSICAL EXAM:      Constitutional: NAD, A&Ox3    Gastrointestinal: Soft, NT/ND, No guarding or rebound    Extremities: no peripheral edema    Incision: CDI        I&O's Detail    I & Os for current day (as of 01 Feb 2017 07:13)  =============================================  IN:    heparin Infusion: 132 ml    Total IN: 132 ml  ---------------------------------------------  OUT:    Voided: 2275 ml    Bulb: 47.5 ml    Total OUT: 2322.5 ml  ---------------------------------------------  Total NET: -2190.5 ml      LABS:                        12.0   9.8   )-----------( 405      ( 01 Feb 2017 02:58 )             37.1     01 Feb 2017 02:58    136    |  100    |  16     ----------------------------<  102    3.9     |  30     |  0.83     Ca    8.9        01 Feb 2017 02:58  Phos  4.5       01 Feb 2017 02:58  Mg     2.2       01 Feb 2017 02:58      PT/INR - ( 30 Jan 2017 07:51 )   PT: 13.4 sec;   INR: 1.20          PTT - ( 01 Feb 2017 02:58 )  PTT:55.8 sec        RADIOLOGY & ADDITIONAL STUDIES:

## 2017-02-01 NOTE — PROGRESS NOTE ADULT - SUBJECTIVE AND OBJECTIVE BOX
Underwent IR drainage of parasplenic ? abssess- came as liquifying hematoma. Doing well. NICOLE- scant sang . On heparin drip for small PE. Plan- cont anticoagulation, d/c NICOLE

## 2017-02-01 NOTE — PROGRESS NOTE ADULT - ASSESSMENT
52 yo M with nontraumatic splenic hematoma and PE  Regular  oseltamivir  pain/nausea control  heparin drip  f/u AM labs

## 2017-02-02 LAB
ANION GAP SERPL CALC-SCNC: 7 MMOL/L — LOW (ref 9–16)
APTT BLD: 53.3 SEC — HIGH (ref 27.5–37.4)
APTT BLD: 98.2 SEC — HIGH (ref 27.5–37.4)
BUN SERPL-MCNC: 11 MG/DL — SIGNIFICANT CHANGE UP (ref 7–23)
CALCIUM SERPL-MCNC: 9.5 MG/DL — SIGNIFICANT CHANGE UP (ref 8.5–10.5)
CHLORIDE SERPL-SCNC: 99 MMOL/L — SIGNIFICANT CHANGE UP (ref 96–108)
CO2 SERPL-SCNC: 29 MMOL/L — SIGNIFICANT CHANGE UP (ref 22–31)
CREAT SERPL-MCNC: 0.73 MG/DL — SIGNIFICANT CHANGE UP (ref 0.5–1.3)
GLUCOSE SERPL-MCNC: 114 MG/DL — HIGH (ref 70–99)
HCT VFR BLD CALC: 38.1 % — LOW (ref 39–50)
HGB BLD-MCNC: 12.7 G/DL — LOW (ref 13–17)
INR BLD: 1.21 — HIGH (ref 0.88–1.16)
MAGNESIUM SERPL-MCNC: 2.3 MG/DL — SIGNIFICANT CHANGE UP (ref 1.6–2.4)
MCHC RBC-ENTMCNC: 30.5 PG — SIGNIFICANT CHANGE UP (ref 27–34)
MCHC RBC-ENTMCNC: 33.3 G/DL — SIGNIFICANT CHANGE UP (ref 32–36)
MCV RBC AUTO: 91.4 FL — SIGNIFICANT CHANGE UP (ref 80–100)
PHOSPHATE SERPL-MCNC: 4 MG/DL — SIGNIFICANT CHANGE UP (ref 2.5–4.5)
PLATELET # BLD AUTO: 417 K/UL — HIGH (ref 150–400)
POTASSIUM SERPL-MCNC: 4.7 MMOL/L — SIGNIFICANT CHANGE UP (ref 3.5–5.3)
POTASSIUM SERPL-SCNC: 4.7 MMOL/L — SIGNIFICANT CHANGE UP (ref 3.5–5.3)
PROTHROM AB SERPL-ACNC: 13.5 SEC — HIGH (ref 10–13.1)
RBC # BLD: 4.17 M/UL — LOW (ref 4.2–5.8)
RBC # FLD: 13 % — SIGNIFICANT CHANGE UP (ref 10.3–16.9)
SODIUM SERPL-SCNC: 135 MMOL/L — SIGNIFICANT CHANGE UP (ref 135–145)
WBC # BLD: 11.2 K/UL — HIGH (ref 3.8–10.5)
WBC # FLD AUTO: 11.2 K/UL — HIGH (ref 3.8–10.5)

## 2017-02-02 RX ORDER — ACETAMINOPHEN 500 MG
650 TABLET ORAL EVERY 6 HOURS
Qty: 0 | Refills: 0 | Status: DISCONTINUED | OUTPATIENT
Start: 2017-02-02 | End: 2017-02-06

## 2017-02-02 RX ORDER — WARFARIN SODIUM 2.5 MG/1
10 TABLET ORAL ONCE
Qty: 0 | Refills: 0 | Status: COMPLETED | OUTPATIENT
Start: 2017-02-02 | End: 2017-02-02

## 2017-02-02 RX ORDER — HEPARIN SODIUM 5000 [USP'U]/ML
1750 INJECTION INTRAVENOUS; SUBCUTANEOUS
Qty: 25000 | Refills: 0 | Status: DISCONTINUED | OUTPATIENT
Start: 2017-02-02 | End: 2017-02-06

## 2017-02-02 RX ADMIN — Medication 3 MILLIGRAM(S): at 22:27

## 2017-02-02 RX ADMIN — Medication 650 MILLIGRAM(S): at 06:30

## 2017-02-02 RX ADMIN — Medication 120 MILLIGRAM(S): at 10:05

## 2017-02-02 RX ADMIN — LISINOPRIL 20 MILLIGRAM(S): 2.5 TABLET ORAL at 10:05

## 2017-02-02 RX ADMIN — WARFARIN SODIUM 10 MILLIGRAM(S): 2.5 TABLET ORAL at 22:26

## 2017-02-02 RX ADMIN — Medication 650 MILLIGRAM(S): at 05:30

## 2017-02-02 RX ADMIN — Medication 75 MILLIGRAM(S): at 17:14

## 2017-02-02 RX ADMIN — Medication 75 MILLIGRAM(S): at 06:18

## 2017-02-02 RX ADMIN — PANTOPRAZOLE SODIUM 40 MILLIGRAM(S): 20 TABLET, DELAYED RELEASE ORAL at 06:18

## 2017-02-02 NOTE — PROGRESS NOTE ADULT - SUBJECTIVE AND OBJECTIVE BOX
NICOLE is out, was draining the old hematoma. H/H is stable. VSS, afeb , abdomen is soft, Nt,ND. Plan : switch to po anticoagulation and D/C home

## 2017-02-02 NOTE — PROGRESS NOTE ADULT - ASSESSMENT
51yoM with PMH CAD, MI, HTN admitted with splenic hematoma and L PE  Pain/Nausea control  Diet DASH  SQH/SCDs/OOBA/IS  Heparin gtt@16.5, trend PTT Q6 until within therapeutic range x3  Will discuss PO A/C preferences with PMD  Possible d/c NICOLE today

## 2017-02-02 NOTE — PROGRESS NOTE ADULT - SUBJECTIVE AND OBJECTIVE BOX
STATUS POST:  IR drainage for splenic hematoma, heparin gtt for L segmental PE     SUBJECTIVE: Patient seen and examined bedside by chief resident. No complaints this AM. Uses NC at night, but denies SOB, HART.    lisinopril 20milliGRAM(s) Oral daily  diltiazem   CD 120milliGRAM(s) Oral daily  oseltamivir 75milliGRAM(s) Oral two times a day  heparin  Infusion 1650Unit(s)/Hr IV Continuous <Continuous>      Vital Signs Last 24 Hrs  T(C): 36.7, Max: 37.1 (02-01 @ 14:00)  T(F): 98.1, Max: 98.8 (02-01 @ 18:00)  HR: 60 (60 - 84)  BP: 136/93 (136/92 - 150/97)  BP(mean): 109 (106 - 116)  RR: 15 (12 - 16)  SpO2: 95% (95% - 95%)  I&O's Detail    I & Os for current day (as of 02 Feb 2017 07:51)  =============================================  IN:    Oral Fluid: 860 ml    heparin Infusion: 75 ml    heparin Infusion: 17 ml    Total IN: 952 ml  ---------------------------------------------  OUT:    Voided: 3240 ml    Bulb: 452 ml    Total OUT: 3692 ml  ---------------------------------------------  Total NET: -2740 ml      General: NAD, resting comfortably in bed  C/V: NSR, HR in 60s  Pulm: Nonlabored breathing, no respiratory distress on 2L NC.  Abd: soft, NT/ND. IR drain in RUQ, dark old blood draining.  Extrem: WWP, no edema        LABS:                        12.7   11.2  )-----------( 417      ( 02 Feb 2017 07:00 )             38.1     02 Feb 2017 07:00    135    |  99     |  11     ----------------------------<  114    4.7     |  29     |  0.73     Ca    9.5        02 Feb 2017 07:00  Phos  4.0       02 Feb 2017 07:00  Mg     2.3       02 Feb 2017 07:00      PTT - ( 02 Feb 2017 07:00 )  PTT:98.2 sec      RADIOLOGY & ADDITIONAL STUDIES:

## 2017-02-03 LAB
APTT BLD: 117.6 SEC — HIGH (ref 27.5–37.4)
APTT BLD: 34 SEC — SIGNIFICANT CHANGE UP (ref 27.5–37.4)
APTT BLD: 88.4 SEC — HIGH (ref 27.5–37.4)
CULTURE RESULTS: NO GROWTH — SIGNIFICANT CHANGE UP
CULTURE RESULTS: SIGNIFICANT CHANGE UP
CULTURE RESULTS: SIGNIFICANT CHANGE UP
HCT VFR BLD CALC: 37.2 % — LOW (ref 39–50)
HGB BLD-MCNC: 12.5 G/DL — LOW (ref 13–17)
INR BLD: 1.19 — HIGH (ref 0.88–1.16)
INR BLD: 1.21 — HIGH (ref 0.88–1.16)
INR BLD: 1.21 — HIGH (ref 0.88–1.16)
MCHC RBC-ENTMCNC: 30.4 PG — SIGNIFICANT CHANGE UP (ref 27–34)
MCHC RBC-ENTMCNC: 33.6 G/DL — SIGNIFICANT CHANGE UP (ref 32–36)
MCV RBC AUTO: 90.5 FL — SIGNIFICANT CHANGE UP (ref 80–100)
PLATELET # BLD AUTO: 419 K/UL — HIGH (ref 150–400)
PROTHROM AB SERPL-ACNC: 13.2 SEC — HIGH (ref 10–13.1)
PROTHROM AB SERPL-ACNC: 13.5 SEC — HIGH (ref 10–13.1)
PROTHROM AB SERPL-ACNC: 13.5 SEC — HIGH (ref 10–13.1)
RBC # BLD: 4.11 M/UL — LOW (ref 4.2–5.8)
RBC # FLD: 12.8 % — SIGNIFICANT CHANGE UP (ref 10.3–16.9)
SPECIMEN SOURCE: SIGNIFICANT CHANGE UP
WBC # BLD: 12 K/UL — HIGH (ref 3.8–10.5)
WBC # FLD AUTO: 12 K/UL — HIGH (ref 3.8–10.5)

## 2017-02-03 RX ORDER — HEPARIN SODIUM 5000 [USP'U]/ML
1650 INJECTION INTRAVENOUS; SUBCUTANEOUS
Qty: 25000 | Refills: 0 | Status: DISCONTINUED | OUTPATIENT
Start: 2017-02-03 | End: 2017-02-03

## 2017-02-03 RX ORDER — WARFARIN SODIUM 2.5 MG/1
10 TABLET ORAL ONCE
Qty: 0 | Refills: 0 | Status: COMPLETED | OUTPATIENT
Start: 2017-02-03 | End: 2017-02-03

## 2017-02-03 RX ADMIN — Medication 3 MILLIGRAM(S): at 22:49

## 2017-02-03 RX ADMIN — FLUTICASONE PROPIONATE AND SALMETEROL 1 DOSE(S): 50; 250 POWDER ORAL; RESPIRATORY (INHALATION) at 10:07

## 2017-02-03 RX ADMIN — HEPARIN SODIUM 15.5 UNIT(S)/HR: 5000 INJECTION INTRAVENOUS; SUBCUTANEOUS at 21:38

## 2017-02-03 RX ADMIN — HEPARIN SODIUM 15.5 UNIT(S)/HR: 5000 INJECTION INTRAVENOUS; SUBCUTANEOUS at 12:13

## 2017-02-03 RX ADMIN — LISINOPRIL 20 MILLIGRAM(S): 2.5 TABLET ORAL at 11:30

## 2017-02-03 RX ADMIN — Medication 75 MILLIGRAM(S): at 18:40

## 2017-02-03 RX ADMIN — Medication 120 MILLIGRAM(S): at 11:30

## 2017-02-03 RX ADMIN — PANTOPRAZOLE SODIUM 40 MILLIGRAM(S): 20 TABLET, DELAYED RELEASE ORAL at 07:11

## 2017-02-03 RX ADMIN — Medication 650 MILLIGRAM(S): at 07:13

## 2017-02-03 RX ADMIN — WARFARIN SODIUM 10 MILLIGRAM(S): 2.5 TABLET ORAL at 21:37

## 2017-02-03 RX ADMIN — Medication 650 MILLIGRAM(S): at 10:07

## 2017-02-03 RX ADMIN — Medication 75 MILLIGRAM(S): at 07:11

## 2017-02-03 NOTE — PROGRESS NOTE ADULT - SUBJECTIVE AND OBJECTIVE BOX
O/N: 6pm PTT subtherapeutic, rate increased following ptt 88.4,next ptt @ 8am. Coumadin given. No signs of hematoma at NICOLE site, per patient pain improved. VSS  2/2: IR drain removed. Pt initially reporting pain at the site, but found sleeping and comfortable. Dr. Chapman contacted; no recommendations or preferences for dispo anticoagulation. Coumadin to start tonight O/N: 6pm PTT subtherapeutic, rate increased following ptt 88.4,next ptt @ 8am. Coumadin given. No signs of hematoma at NICOLE site, per patient pain improved. VSS  2/2: IR drain removed. Pt initially reporting pain at the site, but found sleeping and comfortable. Dr. Chapman contacted; no recommendations or preferences for dispo anticoagulation. Coumadin to start tonight    STATUS POST: drainage of splenic hematoma, left segmental PE     SUBJECTIVE: Patient seen and examined bedside by chief resident. Reports some pain in left flank. No nausea, vomiting.    lisinopril 20milliGRAM(s) Oral daily  diltiazem   CD 120milliGRAM(s) Oral daily  oseltamivir 75milliGRAM(s) Oral two times a day  heparin  Infusion 1750Unit(s)/Hr IV Continuous <Continuous>      Vital Signs Last 24 Hrs  T(C): 37.2, Max: 37.2 (02-02 @ 18:45)  T(F): 98.9, Max: 98.9 (02-02 @ 18:45)  HR: 76 (76 - 88)  BP: 121/84 (121/84 - 152/105)  BP(mean): 95 (95 - 109)  RR: 15 (14 - 16)  SpO2: 97% (96% - 97%)  I&O's Detail    I & Os for current day (as of 03 Feb 2017 07:06)  =============================================  IN:    Oral Fluid: 743 ml    heparin Infusion: 132 ml    Total IN: 875 ml  ---------------------------------------------  OUT:    Voided: 1580 ml    Bulb: 10 ml    Total OUT: 1590 ml  ---------------------------------------------  Total NET: -715 ml      General: NAD, resting comfortably in bed  C/V: NSR, HR in 70s  Pulm: Nonlabored breathing, no respiratory distress  Abd: soft, NT/ND. Some point tenderness just superior to dressing for previous drain site in left flank.  Extrem: WWP, no edema, SCDs in place        LABS:                        12.7   11.2  )-----------( 417      ( 02 Feb 2017 07:00 )             38.1     02 Feb 2017 07:00    135    |  99     |  11     ----------------------------<  114    4.7     |  29     |  0.73     Ca    9.5        02 Feb 2017 07:00  Phos  4.0       02 Feb 2017 07:00  Mg     2.3       02 Feb 2017 07:00      PT/INR - ( 02 Feb 2017 18:28 )   PT: 13.5 sec;   INR: 1.21          PTT - ( 03 Feb 2017 03:22 )  PTT:88.4 sec      RADIOLOGY & ADDITIONAL STUDIES:

## 2017-02-03 NOTE — PROGRESS NOTE ADULT - ASSESSMENT
51M hx CAD, MI s/p angioplasty 10 years on ASA 81, HTN, and pancreatitis 2/2 to EtOH abuse admitted to [facility] with fevers at home, found to have left segmental PE and possible splenic hematoma superinfection s/p IR drainage    Regular diet  Heparin drip bridging to coumadin  PTT  CBC  OOBA  SCDs  IS 51M hx CAD, MI s/p angioplasty 10 years on ASA 81, HTN, and pancreatitis 2/2 to EtOH abuse admitted to [facility] with fevers at home, found to have left segmental PE and possible splenic hematoma superinfection s/p IR drainage    Regular diet  Heparin drip bridging to coumadin  trend PTT   CBC  OOBA  SCDs  IS  Will evaluate need for continued droplet precautions  Will discuss plan with attending

## 2017-02-04 LAB
ANION GAP SERPL CALC-SCNC: 9 MMOL/L — SIGNIFICANT CHANGE UP (ref 9–16)
APTT BLD: 73.8 SEC — HIGH (ref 27.5–37.4)
APTT BLD: 77.4 SEC — HIGH (ref 27.5–37.4)
APTT BLD: 85.4 SEC — HIGH (ref 27.5–37.4)
APTT BLD: 86.9 SEC — HIGH (ref 27.5–37.4)
BUN SERPL-MCNC: 12 MG/DL — SIGNIFICANT CHANGE UP (ref 7–23)
CALCIUM SERPL-MCNC: 9.4 MG/DL — SIGNIFICANT CHANGE UP (ref 8.5–10.5)
CHLORIDE SERPL-SCNC: 99 MMOL/L — SIGNIFICANT CHANGE UP (ref 96–108)
CO2 SERPL-SCNC: 26 MMOL/L — SIGNIFICANT CHANGE UP (ref 22–31)
CREAT SERPL-MCNC: 0.7 MG/DL — SIGNIFICANT CHANGE UP (ref 0.5–1.3)
GLUCOSE SERPL-MCNC: 96 MG/DL — SIGNIFICANT CHANGE UP (ref 70–99)
HCT VFR BLD CALC: 37.1 % — LOW (ref 39–50)
HGB BLD-MCNC: 12.4 G/DL — LOW (ref 13–17)
INR BLD: 1.27 — HIGH (ref 0.88–1.16)
MCHC RBC-ENTMCNC: 30.4 PG — SIGNIFICANT CHANGE UP (ref 27–34)
MCHC RBC-ENTMCNC: 33.4 G/DL — SIGNIFICANT CHANGE UP (ref 32–36)
MCV RBC AUTO: 90.9 FL — SIGNIFICANT CHANGE UP (ref 80–100)
PLATELET # BLD AUTO: 415 K/UL — HIGH (ref 150–400)
POTASSIUM SERPL-MCNC: 4.4 MMOL/L — SIGNIFICANT CHANGE UP (ref 3.5–5.3)
POTASSIUM SERPL-SCNC: 4.4 MMOL/L — SIGNIFICANT CHANGE UP (ref 3.5–5.3)
PROTHROM AB SERPL-ACNC: 14.1 SEC — HIGH (ref 10–13.1)
RBC # BLD: 4.08 M/UL — LOW (ref 4.2–5.8)
RBC # FLD: 12.9 % — SIGNIFICANT CHANGE UP (ref 10.3–16.9)
SODIUM SERPL-SCNC: 134 MMOL/L — LOW (ref 135–145)
WBC # BLD: 11.2 K/UL — HIGH (ref 3.8–10.5)
WBC # FLD AUTO: 11.2 K/UL — HIGH (ref 3.8–10.5)

## 2017-02-04 RX ORDER — WARFARIN SODIUM 2.5 MG/1
10 TABLET ORAL ONCE
Qty: 0 | Refills: 0 | Status: COMPLETED | OUTPATIENT
Start: 2017-02-04 | End: 2017-02-04

## 2017-02-04 RX ORDER — PANTOPRAZOLE SODIUM 20 MG/1
40 TABLET, DELAYED RELEASE ORAL ONCE
Qty: 0 | Refills: 0 | Status: COMPLETED | OUTPATIENT
Start: 2017-02-04 | End: 2017-02-04

## 2017-02-04 RX ADMIN — Medication 75 MILLIGRAM(S): at 06:08

## 2017-02-04 RX ADMIN — PANTOPRAZOLE SODIUM 40 MILLIGRAM(S): 20 TABLET, DELAYED RELEASE ORAL at 06:08

## 2017-02-04 RX ADMIN — FLUTICASONE PROPIONATE AND SALMETEROL 1 DOSE(S): 50; 250 POWDER ORAL; RESPIRATORY (INHALATION) at 10:53

## 2017-02-04 RX ADMIN — WARFARIN SODIUM 10 MILLIGRAM(S): 2.5 TABLET ORAL at 21:09

## 2017-02-04 RX ADMIN — Medication 3 MILLIGRAM(S): at 22:37

## 2017-02-04 RX ADMIN — Medication 120 MILLIGRAM(S): at 11:54

## 2017-02-04 RX ADMIN — LISINOPRIL 20 MILLIGRAM(S): 2.5 TABLET ORAL at 06:08

## 2017-02-04 RX ADMIN — Medication 75 MILLIGRAM(S): at 18:02

## 2017-02-04 RX ADMIN — PANTOPRAZOLE SODIUM 40 MILLIGRAM(S): 20 TABLET, DELAYED RELEASE ORAL at 18:03

## 2017-02-04 NOTE — PROGRESS NOTE ADULT - ASSESSMENT
51M hx CAD, MI s/p angioplasty 10 years on ASA 81, HTN, and pancreatitis 2/2 to EtOH abuse admitted to [facility] with fevers at home, found to have left segmental PE and possible splenic hematoma superinfection s/p IR drainage    Regular diet  Heparin drip  Coumadin  Home meds  IS  OOBA  Am labs

## 2017-02-04 NOTE — PROGRESS NOTE ADULT - SUBJECTIVE AND OBJECTIVE BOX
O/N: Afebrile, VSS. Heparin drip re-started, left flank fullness unchanged.  2/3: Increasing left flank fullness and pain. Heparin gtt held for four hours. VSS, aside from intermittent mild tachycardia (low 90s) O/N: Afebrile, VSS. Heparin drip re-started, left flank fullness unchanged.  2/3: Increasing left flank fullness and pain. Heparin gtt held for four hours. VSS, aside from intermittent mild tachycardia (low 90s)            SUBJECTIVE: Patient denies any complaints   Flatus: [X] YES [] NO             Bowel Movement: [ X] YES [ ] NO  Pain (0-10):            Pain Control Adequate: [X ] YES [ ] NO  Nausea: [ ] YES [X ] NO            Vomiting: [ ] YES [X ] NO  Diarrhea: [ ] YES [X ] NO         Constipation: [ ] YES [X ] NO     Chest Pain: [ ] YES [X] NO    SOB:  [ ] YES [X ] NO    lisinopril 20milliGRAM(s) Oral daily  diltiazem   CD 120milliGRAM(s) Oral daily  oseltamivir 75milliGRAM(s) Oral two times a day  heparin  Infusion 1750Unit(s)/Hr IV Continuous <Continuous>      Vital Signs Last 24 Hrs  T(C): 36.9, Max: 36.9 (02-03 @ 21:48)  T(F): 98.4, Max: 98.5 (02-03 @ 21:48)  HR: 64 (64 - 98)  BP: 109/77 (108/75 - 142/92)  BP(mean): 88 (86 - 112)  RR: 16 (16 - 16)  SpO2: 98% (98% - 99%)  I&O's Detail    I & Os for current day (as of 04 Feb 2017 09:18)  =============================================  IN:    Oral Fluid: 1347 ml    heparin Infusion: 93 ml    Total IN: 1440 ml  ---------------------------------------------  OUT:    Voided: 1200 ml    Total OUT: 1200 ml  ---------------------------------------------  Total NET: 240 ml      General: NAD, resting comfortably in bed  C/V: NSR  Pulm: Nonlabored breathing, no respiratory distress  Abd: soft, NT/ND.  Extrem: WWP, no edema, SCDs in place        LABS:                        12.4   11.2  )-----------( 415      ( 04 Feb 2017 07:58 )             37.1     04 Feb 2017 07:58    134    |  99     |  12     ----------------------------<  96     4.4     |  26     |  0.70     Ca    9.4        04 Feb 2017 07:58      PT/INR - ( 04 Feb 2017 07:58 )   PT: 14.1 sec;   INR: 1.27          PTT - ( 04 Feb 2017 07:58 )  PTT:85.4 sec      RADIOLOGY & ADDITIONAL STUDIES:

## 2017-02-05 LAB
ANION GAP SERPL CALC-SCNC: 9 MMOL/L — SIGNIFICANT CHANGE UP (ref 9–16)
APTT BLD: 88.3 SEC — HIGH (ref 27.5–37.4)
BUN SERPL-MCNC: 12 MG/DL — SIGNIFICANT CHANGE UP (ref 7–23)
CALCIUM SERPL-MCNC: 9.4 MG/DL — SIGNIFICANT CHANGE UP (ref 8.5–10.5)
CHLORIDE SERPL-SCNC: 99 MMOL/L — SIGNIFICANT CHANGE UP (ref 96–108)
CO2 SERPL-SCNC: 28 MMOL/L — SIGNIFICANT CHANGE UP (ref 22–31)
CREAT SERPL-MCNC: 0.82 MG/DL — SIGNIFICANT CHANGE UP (ref 0.5–1.3)
GLUCOSE SERPL-MCNC: 100 MG/DL — HIGH (ref 70–99)
HCT VFR BLD CALC: 37.8 % — LOW (ref 39–50)
HGB BLD-MCNC: 12.4 G/DL — LOW (ref 13–17)
INR BLD: 1.6 — HIGH (ref 0.88–1.16)
MAGNESIUM SERPL-MCNC: 2.1 MG/DL — SIGNIFICANT CHANGE UP (ref 1.6–2.4)
MCHC RBC-ENTMCNC: 29.9 PG — SIGNIFICANT CHANGE UP (ref 27–34)
MCHC RBC-ENTMCNC: 32.8 G/DL — SIGNIFICANT CHANGE UP (ref 32–36)
MCV RBC AUTO: 91.1 FL — SIGNIFICANT CHANGE UP (ref 80–100)
PHOSPHATE SERPL-MCNC: 3.8 MG/DL — SIGNIFICANT CHANGE UP (ref 2.5–4.5)
PLATELET # BLD AUTO: 492 K/UL — HIGH (ref 150–400)
POTASSIUM SERPL-MCNC: 4.1 MMOL/L — SIGNIFICANT CHANGE UP (ref 3.5–5.3)
POTASSIUM SERPL-SCNC: 4.1 MMOL/L — SIGNIFICANT CHANGE UP (ref 3.5–5.3)
PROTHROM AB SERPL-ACNC: 17.8 SEC — HIGH (ref 10–13.1)
RBC # BLD: 4.15 M/UL — LOW (ref 4.2–5.8)
RBC # FLD: 12.5 % — SIGNIFICANT CHANGE UP (ref 10.3–16.9)
SODIUM SERPL-SCNC: 136 MMOL/L — SIGNIFICANT CHANGE UP (ref 135–145)
WBC # BLD: 12 K/UL — HIGH (ref 3.8–10.5)
WBC # FLD AUTO: 12 K/UL — HIGH (ref 3.8–10.5)

## 2017-02-05 RX ORDER — WARFARIN SODIUM 2.5 MG/1
5 TABLET ORAL ONCE
Qty: 0 | Refills: 0 | Status: COMPLETED | OUTPATIENT
Start: 2017-02-05 | End: 2017-02-05

## 2017-02-05 RX ADMIN — LISINOPRIL 20 MILLIGRAM(S): 2.5 TABLET ORAL at 05:47

## 2017-02-05 RX ADMIN — Medication 120 MILLIGRAM(S): at 11:31

## 2017-02-05 RX ADMIN — HEPARIN SODIUM 15.5 UNIT(S)/HR: 5000 INJECTION INTRAVENOUS; SUBCUTANEOUS at 22:30

## 2017-02-05 RX ADMIN — PANTOPRAZOLE SODIUM 40 MILLIGRAM(S): 20 TABLET, DELAYED RELEASE ORAL at 06:46

## 2017-02-05 RX ADMIN — Medication 75 MILLIGRAM(S): at 19:30

## 2017-02-05 RX ADMIN — Medication 75 MILLIGRAM(S): at 05:47

## 2017-02-05 RX ADMIN — WARFARIN SODIUM 5 MILLIGRAM(S): 2.5 TABLET ORAL at 22:29

## 2017-02-05 RX ADMIN — HEPARIN SODIUM 15.5 UNIT(S)/HR: 5000 INJECTION INTRAVENOUS; SUBCUTANEOUS at 05:48

## 2017-02-05 RX ADMIN — Medication 3 MILLIGRAM(S): at 22:29

## 2017-02-05 NOTE — PROGRESS NOTE ADULT - ASSESSMENT
51yoM with PMH CAD, MI, HTN, questionable EtOH abuse, admitted with left segmental PE and splenic hematoma  Pain/Nausea control  Diet DASH  SCDs/OOBA/IS  heparin gtt@15.5U for anticoagulation, bridging to Coumadin. Daily PTT/INR.  oseltamivir for flu 51yoM with PMH CAD, MI, HTN, questionable EtOH abuse, admitted with left segmental PE and splenic hematoma  Pain/Nausea control  Diet DASH  SCDs/OOBA/IS  heparin gtt@15.5U for anticoagulation, bridging to Coumadin. Daily PTT/INR.  oseltamivir for flu  OOB/A

## 2017-02-05 NOTE — PROGRESS NOTE ADULT - SUBJECTIVE AND OBJECTIVE BOX
O/N: Moved to [facility] off contact precautions. PTT therapeutic x4.   2/4: aptt 85, INR 1.27, will be given Coumadin 10 mg overnight O/N: Moved to [facility] off contact precautions. PTT therapeutic x4.   2/4: aptt 85, INR 1.27, will be given Coumadin 10 mg overnight       SUBJECTIVE:  Flatus: [x ] YES [ ] NO             Bowel Movement: [x ] YES [ ] NO  Pain (0-10):            Pain Control Adequate: [x ] YES [ ] NO  Nausea: [ ] YES [x ] NO            Vomiting: [ ] YES [ x] NO  Diarrhea: [ ] YES [x ] NO         Constipation: [ ] YES [x ] NO     Chest Pain: [ ] YES [x ] NO    SOB:  [ ] YES [x ] NO    MEDICATIONS  (STANDING):  fluticasone / salmeterol 100-50 MICROgram(s) Diskus 1Dose(s) Inhalation two times a day  lisinopril 20milliGRAM(s) Oral daily  diltiazem   CD 120milliGRAM(s) Oral daily  pantoprazole    Tablet 40milliGRAM(s) Oral before breakfast  oseltamivir 75milliGRAM(s) Oral two times a day  heparin  Infusion 1750Unit(s)/Hr IV Continuous <Continuous>  warfarin 5milliGRAM(s) Oral once    MEDICATIONS  (PRN):  nicotine  Polacrilex Gum 2milliGRAM(s) Oral two times a day PRN agitation  ALBUTerol/ipratropium for Nebulization 3milliLiter(s) Nebulizer every 6 hours PRN Shortness of Breath and/or Wheezing  docusate sodium 100milliGRAM(s) Oral two times a day PRN Constipation  melatonin 3milliGRAM(s) Oral at bedtime PRN Insomnia  acetaminophen   Tablet. 650milliGRAM(s) Oral every 6 hours PRN Mild Pain (1 - 3)  oxyCODONE  5 mG/acetaminophen 325 mG 1Tablet(s) Oral every 4 hours PRN Moderate Pain (4 - 6)  oxyCODONE  5 mG/acetaminophen 325 mG 2Tablet(s) Oral every 4 hours PRN Severe Pain (7 - 10)      Vital Signs Last 24 Hrs  T(C): 36.9, Max: 36.9 (02-05 @ 05:21)  T(F): 98.4, Max: 98.4 (02-05 @ 05:21)  HR: 74 (70 - 90)  BP: 119/75 (114/83 - 160/96)  BP(mean): 91 (91 - 108)  RR: 16 (16 - 20)  SpO2: 98% (97% - 99%)    PHYSICAL EXAM:      Constitutional: A&Ox3    Respiratory: non labored breathing, no respiratory distress    Cardiovascular: NSR, RRR    Gastrointestinal: soft, non distended, + TTP to L flank area, improving                 Incision: CDI    Genitourinary: voiding     Extremities: (-) edema          I&O's Detail    I & Os for current day (as of 05 Feb 2017 08:50)  =============================================  IN:    Oral Fluid: 618 ml    Total IN: 618 ml  ---------------------------------------------  OUT:    Voided: 2000 ml    Total OUT: 2000 ml  ---------------------------------------------  Total NET: -1382 ml      LABS:                        12.4   12.0  )-----------( 492      ( 05 Feb 2017 06:40 )             37.8     05 Feb 2017 06:40    136    |  99     |  12     ----------------------------<  100    4.1     |  28     |  0.82     Ca    9.4        05 Feb 2017 06:40  Phos  3.8       05 Feb 2017 06:40  Mg     2.1       05 Feb 2017 06:40      PT/INR - ( 05 Feb 2017 06:40 )   PT: 17.8 sec;   INR: 1.60          PTT - ( 05 Feb 2017 06:40 )  PTT:88.3 sec      RADIOLOGY & ADDITIONAL STUDIES:

## 2017-02-05 NOTE — PROGRESS NOTE ADULT - ATTENDING COMMENTS
H/H stable  vitals stable  fullness left flank less tender and smaller/softer  cont care
Feeling fullness at drain d/c site, some pain  3x3 cm fullness, minimally tender; ? of hematoma from drain and anticoag  will decrease anti coag  cont local care  hd stable   watch h/h
h/h stable  less pain in side  INR 1.6  possible d/c in am

## 2017-02-06 ENCOUNTER — TRANSCRIPTION ENCOUNTER (OUTPATIENT)
Age: 52
End: 2017-02-06

## 2017-02-06 VITALS — TEMPERATURE: 97 F

## 2017-02-06 LAB
ANION GAP SERPL CALC-SCNC: 8 MMOL/L — LOW (ref 9–16)
APTT BLD: 128.9 SEC — CRITICAL HIGH (ref 27.5–37.4)
APTT BLD: 142.5 SEC — CRITICAL HIGH (ref 27.5–37.4)
APTT BLD: 77.6 SEC — HIGH (ref 27.5–37.4)
BLD GP AB SCN SERPL QL: NEGATIVE — SIGNIFICANT CHANGE UP
BUN SERPL-MCNC: 15 MG/DL — SIGNIFICANT CHANGE UP (ref 7–23)
CALCIUM SERPL-MCNC: 9.6 MG/DL — SIGNIFICANT CHANGE UP (ref 8.5–10.5)
CHLORIDE SERPL-SCNC: 99 MMOL/L — SIGNIFICANT CHANGE UP (ref 96–108)
CO2 SERPL-SCNC: 27 MMOL/L — SIGNIFICANT CHANGE UP (ref 22–31)
CREAT SERPL-MCNC: 0.77 MG/DL — SIGNIFICANT CHANGE UP (ref 0.5–1.3)
GLUCOSE SERPL-MCNC: 105 MG/DL — HIGH (ref 70–99)
HCT VFR BLD CALC: 36 % — LOW (ref 39–50)
HGB BLD-MCNC: 11.9 G/DL — LOW (ref 13–17)
INR BLD: 1.89 — HIGH (ref 0.88–1.16)
MAGNESIUM SERPL-MCNC: 2.2 MG/DL — SIGNIFICANT CHANGE UP (ref 1.6–2.4)
MCHC RBC-ENTMCNC: 30.4 PG — SIGNIFICANT CHANGE UP (ref 27–34)
MCHC RBC-ENTMCNC: 33.1 G/DL — SIGNIFICANT CHANGE UP (ref 32–36)
MCV RBC AUTO: 91.8 FL — SIGNIFICANT CHANGE UP (ref 80–100)
PHOSPHATE SERPL-MCNC: 4.1 MG/DL — SIGNIFICANT CHANGE UP (ref 2.5–4.5)
PLATELET # BLD AUTO: 440 K/UL — HIGH (ref 150–400)
POTASSIUM SERPL-MCNC: 4.2 MMOL/L — SIGNIFICANT CHANGE UP (ref 3.5–5.3)
POTASSIUM SERPL-SCNC: 4.2 MMOL/L — SIGNIFICANT CHANGE UP (ref 3.5–5.3)
PROTHROM AB SERPL-ACNC: 21.1 SEC — HIGH (ref 10–13.1)
RBC # BLD: 3.92 M/UL — LOW (ref 4.2–5.8)
RBC # FLD: 13 % — SIGNIFICANT CHANGE UP (ref 10.3–16.9)
RH IG SCN BLD-IMP: POSITIVE — SIGNIFICANT CHANGE UP
SODIUM SERPL-SCNC: 134 MMOL/L — LOW (ref 135–145)
WBC # BLD: 10.6 K/UL — HIGH (ref 3.8–10.5)
WBC # FLD AUTO: 10.6 K/UL — HIGH (ref 3.8–10.5)

## 2017-02-06 PROCEDURE — 80307 DRUG TEST PRSMV CHEM ANLYZR: CPT

## 2017-02-06 PROCEDURE — 87075 CULTR BACTERIA EXCEPT BLOOD: CPT

## 2017-02-06 PROCEDURE — 87581 M.PNEUMON DNA AMP PROBE: CPT

## 2017-02-06 PROCEDURE — 87040 BLOOD CULTURE FOR BACTERIA: CPT

## 2017-02-06 PROCEDURE — 99285 EMERGENCY DEPT VISIT HI MDM: CPT | Mod: 25

## 2017-02-06 PROCEDURE — 82728 ASSAY OF FERRITIN: CPT

## 2017-02-06 PROCEDURE — 81003 URINALYSIS AUTO W/O SCOPE: CPT

## 2017-02-06 PROCEDURE — 85730 THROMBOPLASTIN TIME PARTIAL: CPT

## 2017-02-06 PROCEDURE — 85025 COMPLETE CBC W/AUTO DIFF WBC: CPT

## 2017-02-06 PROCEDURE — 83735 ASSAY OF MAGNESIUM: CPT

## 2017-02-06 PROCEDURE — 83605 ASSAY OF LACTIC ACID: CPT

## 2017-02-06 PROCEDURE — 87205 SMEAR GRAM STAIN: CPT

## 2017-02-06 PROCEDURE — 49406 IMAGE CATH FLUID PERI/RETRO: CPT

## 2017-02-06 PROCEDURE — 36415 COLL VENOUS BLD VENIPUNCTURE: CPT

## 2017-02-06 PROCEDURE — 83550 IRON BINDING TEST: CPT

## 2017-02-06 PROCEDURE — 86850 RBC ANTIBODY SCREEN: CPT

## 2017-02-06 PROCEDURE — 93970 EXTREMITY STUDY: CPT

## 2017-02-06 PROCEDURE — 71045 X-RAY EXAM CHEST 1 VIEW: CPT

## 2017-02-06 PROCEDURE — 85027 COMPLETE CBC AUTOMATED: CPT

## 2017-02-06 PROCEDURE — 83690 ASSAY OF LIPASE: CPT

## 2017-02-06 PROCEDURE — 93005 ELECTROCARDIOGRAM TRACING: CPT

## 2017-02-06 PROCEDURE — 71275 CT ANGIOGRAPHY CHEST: CPT

## 2017-02-06 PROCEDURE — 85610 PROTHROMBIN TIME: CPT

## 2017-02-06 PROCEDURE — 74177 CT ABD & PELVIS W/CONTRAST: CPT

## 2017-02-06 PROCEDURE — 87086 URINE CULTURE/COLONY COUNT: CPT

## 2017-02-06 PROCEDURE — 86900 BLOOD TYPING SEROLOGIC ABO: CPT

## 2017-02-06 PROCEDURE — 87486 CHLMYD PNEUM DNA AMP PROBE: CPT

## 2017-02-06 PROCEDURE — 87070 CULTURE OTHR SPECIMN AEROBIC: CPT

## 2017-02-06 PROCEDURE — 81001 URINALYSIS AUTO W/SCOPE: CPT

## 2017-02-06 PROCEDURE — 87798 DETECT AGENT NOS DNA AMP: CPT

## 2017-02-06 PROCEDURE — 86901 BLOOD TYPING SEROLOGIC RH(D): CPT

## 2017-02-06 PROCEDURE — 96374 THER/PROPH/DIAG INJ IV PUSH: CPT | Mod: XU

## 2017-02-06 PROCEDURE — 84100 ASSAY OF PHOSPHORUS: CPT

## 2017-02-06 PROCEDURE — 80053 COMPREHEN METABOLIC PANEL: CPT

## 2017-02-06 PROCEDURE — 94640 AIRWAY INHALATION TREATMENT: CPT

## 2017-02-06 PROCEDURE — 96375 TX/PRO/DX INJ NEW DRUG ADDON: CPT | Mod: XU

## 2017-02-06 PROCEDURE — 87633 RESP VIRUS 12-25 TARGETS: CPT

## 2017-02-06 PROCEDURE — 80048 BASIC METABOLIC PNL TOTAL CA: CPT

## 2017-02-06 RX ORDER — WARFARIN SODIUM 2.5 MG/1
1 TABLET ORAL
Qty: 30 | Refills: 0 | OUTPATIENT
Start: 2017-02-06 | End: 2017-03-08

## 2017-02-06 RX ORDER — WARFARIN SODIUM 2.5 MG/1
5 TABLET ORAL ONCE
Qty: 0 | Refills: 0 | Status: DISCONTINUED | OUTPATIENT
Start: 2017-02-06 | End: 2017-02-06

## 2017-02-06 RX ORDER — HEPARIN SODIUM 5000 [USP'U]/ML
1400 INJECTION INTRAVENOUS; SUBCUTANEOUS
Qty: 25000 | Refills: 0 | Status: DISCONTINUED | OUTPATIENT
Start: 2017-02-06 | End: 2017-02-06

## 2017-02-06 RX ADMIN — Medication 75 MILLIGRAM(S): at 06:40

## 2017-02-06 RX ADMIN — PANTOPRAZOLE SODIUM 40 MILLIGRAM(S): 20 TABLET, DELAYED RELEASE ORAL at 06:30

## 2017-02-06 RX ADMIN — Medication 120 MILLIGRAM(S): at 12:29

## 2017-02-06 RX ADMIN — LISINOPRIL 20 MILLIGRAM(S): 2.5 TABLET ORAL at 06:30

## 2017-02-06 NOTE — PROGRESS NOTE ADULT - SUBJECTIVE AND OBJECTIVE BOX
Doing well, no compl. VSS, afeb Abdomen is soft, NT, ND. H/H is stable. INR 1,9. Plan: d/c home on 5 mg of coumadin daily, f/u with PCP

## 2017-02-06 NOTE — DISCHARGE NOTE ADULT - HOSPITAL COURSE
NICOLE drain placed and has since been removed without complications. Anticoagulation to prevent and treat PE achieved with heparin drip. Warfarin has since been added and will be monitored until therapeutic. Diet was advanced as tolerated and pain was well controlled on medication. On day of discharge, pt deemed stable and ready to return home with plan to follow up as an outpatient.

## 2017-02-06 NOTE — DISCHARGE NOTE ADULT - PLAN OF CARE
Full recovery Continue to advance diet as tolerated. You may shower, but do not bathe or submerge in water for at least two weeks. Leave steri-strips in place if present; they will fall off on their own. Please be sure to keep the wound clean and dry, changing any dressings daily (except steri-strips), unless instructed otherwise. No heavy lifting or strenuous exercise until cleared by your surgeon. Contact your doctor or go to the ER for fever > 101.5, chills, nausea, vomiting, chest pain, shortness of breath, pain not controlled by medication or excessive bleeding. Please follow up with Dr. Hu in one week; you may call the office to make an appointment at your earliest convenience. Continue to advance diet as tolerated. You may shower, but do not bathe or submerge in water for at least two weeks. Please be sure to keep the wound clean and dry, changing any dressings daily (except steri-strips), unless instructed otherwise. No heavy lifting or strenuous exercise until cleared by your surgeon. Contact your doctor or go to the ER for fever > 101.5, chills, nausea, vomiting, chest pain, shortness of breath, pain not controlled by medication or excessive bleeding. Please follow up with Dr. Hu in one week; you may call the office to make an appointment at your earliest convenience.  -Follow-up with Dr Nelson as soon as possible. Call the office to schedule an appointment.

## 2017-02-06 NOTE — PROGRESS NOTE ADULT - ASSESSMENT
51M hx CAD, MI s/p angioplasty 10 years on ASA 81, HTN, and pancreatitis 2/2 to EtOH abuse admitted to [facility] with fevers at home, found to have left segmental PE and splenic hematoma s/p IR drainage    Regular diet  Heparin drip  Coumadin  AM labs  IS  OOBA

## 2017-02-06 NOTE — DISCHARGE NOTE ADULT - REASON FOR ADMISSION
51M hx CAD, MI s/p angioplasty 10 years on ASA 81, HTN, and pancreatitis 2/2 to EtOH abuse admitted to [facility] with fevers at home, found to have left segmental PE and possible splenic hematoma superinfection

## 2017-02-06 NOTE — DISCHARGE NOTE ADULT - CARE PLAN
Principal Discharge DX:	Other acute pulmonary embolism without acute cor pulmonale  Goal:	Full recovery  Instructions for follow-up, activity and diet:	Continue to advance diet as tolerated. You may shower, but do not bathe or submerge in water for at least two weeks. Leave steri-strips in place if present; they will fall off on their own. Please be sure to keep the wound clean and dry, changing any dressings daily (except steri-strips), unless instructed otherwise. No heavy lifting or strenuous exercise until cleared by your surgeon. Contact your doctor or go to the ER for fever > 101.5, chills, nausea, vomiting, chest pain, shortness of breath, pain not controlled by medication or excessive bleeding. Please follow up with Dr. Hu in one week; you may call the office to make an appointment at your earliest convenience. Principal Discharge DX:	Other acute pulmonary embolism without acute cor pulmonale  Goal:	Full recovery  Instructions for follow-up, activity and diet:	Continue to advance diet as tolerated. You may shower, but do not bathe or submerge in water for at least two weeks. Please be sure to keep the wound clean and dry, changing any dressings daily (except steri-strips), unless instructed otherwise. No heavy lifting or strenuous exercise until cleared by your surgeon. Contact your doctor or go to the ER for fever > 101.5, chills, nausea, vomiting, chest pain, shortness of breath, pain not controlled by medication or excessive bleeding. Please follow up with Dr. Hu in one week; you may call the office to make an appointment at your earliest convenience.  -Follow-up with Dr Nelson as soon as possible. Call the office to schedule an appointment.

## 2017-02-06 NOTE — DISCHARGE NOTE ADULT - MEDICATION SUMMARY - MEDICATIONS TO TAKE
I will START or STAY ON the medications listed below when I get home from the hospital:    oxyCODONE-acetaminophen 5mg-325mg oral tablet  -- 1 tab(s) by mouth every 4 to 6 hours, As Needed -for severe pain MDD:6  -- Caution federal law prohibits the transfer of this drug to any person other  than the person for whom it was prescribed.  May cause drowsiness.  Alcohol may intensify this effect.  Use care when operating dangerous machinery.  This prescription cannot be refilled.  This product contains acetaminophen.  Do not use  with any other product containing acetaminophen to prevent possible liver damage.  Using more of this medication than prescribed may cause serious breathing problems.    -- Indication: For Pain control    lisinopril 20 mg oral tablet  -- 1 tab(s) by mouth once a day  -- Indication: For Hypertension    diltiazem 24 hour extended release  -- 120 milligram(s) by mouth once a day  -- Indication: For Afib    Coumadin 5 mg oral tablet  -- 1 tab(s) by mouth once a day at bedtime  -- Indication: For Pulmonary thromboembolism    simvastatin  --  by mouth   -- Indication: For Hyperlipidemia    Advair Diskus 100 mcg-50 mcg inhalation powder  -- 1 puff(s) inhaled 2 times a day  -- Check with your doctor before becoming pregnant.  For inhalation only.  Obtain medical advice before taking any non-prescription drugs as some may affect the action of this medication.  Rinse mouth thoroughly after use.    -- Indication: For COPD    Ventolin HFA CFC free 90 mcg/inh inhalation aerosol  -- 2 puff(s) inhaled 4 times a day  -- For inhalation only.  It is very important that you take or use this exactly as directed.  Do not skip doses or discontinue unless directed by your doctor.  Obtain medical advice before taking any non-prescription drugs as some may affect the action of this medication.  Shake well before use.    -- Indication: For COPD    Spiriva 18 mcg inhalation capsule  -- 1 cap(s) inhaled once a day  -- Check with your doctor before becoming pregnant.  For inhalation only.  It is very important that you take or use this exactly as directed.  Do not skip doses or discontinue unless directed by your doctor.  Obtain medical advice before taking any non-prescription drugs as some may affect the action of this medication.    -- Indication: For COPD    Colace 100 mg oral capsule  -- 1 cap(s) by mouth 2 times a day, As Needed -for constipation  -- Medication should be taken with plenty of water.    -- Indication: For Constipation    NexIUM  --  by mouth   -- Indication: For Acid reflux

## 2017-02-06 NOTE — PROGRESS NOTE ADULT - SUBJECTIVE AND OBJECTIVE BOX
O/N: Afebrile, VSS, DORIE  2/5: H/H stable; PTT therapeutic; INR 1.60; coumadin 5mg tonight O/N: Afebrile, VSS, DORIE  2/5: H/H stable; PTT therapeutic; INR 1.60; coumadin 5mg tonight     STATUS POST:  IR drainage of perisplenic abscess     SUBJECTIVE: Patient seen and examined bedside by chief resident. No complaints this morning though concerned about bleeding risk with anticoagulation for PE, especially given R flank swelling.    lisinopril 20milliGRAM(s) Oral daily  diltiazem   CD 120milliGRAM(s) Oral daily  oseltamivir 75milliGRAM(s) Oral two times a day  heparin  Infusion 1750Unit(s)/Hr IV Continuous <Continuous>      Vital Signs Last 24 Hrs  T(C): 35.7, Max: 36.9 (02-05 @ 21:24)  T(F): 96.2, Max: 98.4 (02-05 @ 21:24)  HR: 78 (78 - 90)  BP: 126/87 (102/60 - 142/97)  BP(mean): 103 (76 - 114)  RR: 16 (14 - 16)  SpO2: 97% (96% - 98%)  I&O's Detail    I & Os for current day (as of 06 Feb 2017 07:17)  =============================================  IN:    heparin Infusion: 356.5 ml    Total IN: 356.5 ml  ---------------------------------------------  OUT:    Voided: 2050 ml    Total OUT: 2050 ml  ---------------------------------------------  Total NET: -1693.5 ml      General: NAD, resting comfortably in bed  C/V: NSR, HR in 70s  Pulm: Nonlabored breathing, no respiratory distress  Abd: soft, NT/ND. R flank with unchanged swelling, no ecchymosis.  Extrem: WWP, no edema, SCDs in place        LABS:                        11.9   10.6  )-----------( 440      ( 06 Feb 2017 05:49 )             36.0     06 Feb 2017 05:47    134    |  99     |  15     ----------------------------<  105    4.2     |  27     |  0.77     Ca    9.6        06 Feb 2017 05:47  Phos  4.1       06 Feb 2017 05:47  Mg     2.2       06 Feb 2017 05:47      PT/INR - ( 06 Feb 2017 05:50 )   PT: 21.1 sec;   INR: 1.89          PTT - ( 06 Feb 2017 05:50 )  PTT:142.5 sec      RADIOLOGY & ADDITIONAL STUDIES:

## 2017-02-06 NOTE — DISCHARGE NOTE ADULT - NS AS DC VTE INSTRUCTION
Coumadin/Warfarin - Dietary Advice.../Coumadin/Warfarin - Potential for adverse drug reactions and interactions/Coumadin/Warfarin - Follow-up monitoring.../Coumadin/Warfarin - Compliance...

## 2017-02-08 ENCOUNTER — APPOINTMENT (OUTPATIENT)
Dept: INTERNAL MEDICINE | Facility: CLINIC | Age: 52
End: 2017-02-08

## 2017-02-08 VITALS
TEMPERATURE: 98.4 F | DIASTOLIC BLOOD PRESSURE: 70 MMHG | OXYGEN SATURATION: 98 % | SYSTOLIC BLOOD PRESSURE: 110 MMHG | HEART RATE: 86 BPM | HEIGHT: 72 IN | BODY MASS INDEX: 30.48 KG/M2 | WEIGHT: 225 LBS

## 2017-02-08 DIAGNOSIS — K86.0 ALCOHOL-INDUCED CHRONIC PANCREATITIS: ICD-10-CM

## 2017-02-08 DIAGNOSIS — J44.9 CHRONIC OBSTRUCTIVE PULMONARY DISEASE, UNSPECIFIED: ICD-10-CM

## 2017-02-08 DIAGNOSIS — E78.5 HYPERLIPIDEMIA, UNSPECIFIED: ICD-10-CM

## 2017-02-08 DIAGNOSIS — I48.91 UNSPECIFIED ATRIAL FIBRILLATION: ICD-10-CM

## 2017-02-08 DIAGNOSIS — I10 ESSENTIAL (PRIMARY) HYPERTENSION: ICD-10-CM

## 2017-02-08 DIAGNOSIS — I25.2 OLD MYOCARDIAL INFARCTION: ICD-10-CM

## 2017-02-08 DIAGNOSIS — R50.9 FEVER, UNSPECIFIED: ICD-10-CM

## 2017-02-08 DIAGNOSIS — I25.10 ATHEROSCLEROTIC HEART DISEASE OF NATIVE CORONARY ARTERY WITHOUT ANGINA PECTORIS: ICD-10-CM

## 2017-02-08 DIAGNOSIS — F14.10 COCAINE ABUSE, UNCOMPLICATED: ICD-10-CM

## 2017-02-08 DIAGNOSIS — D63.8 ANEMIA IN OTHER CHRONIC DISEASES CLASSIFIED ELSEWHERE: ICD-10-CM

## 2017-02-08 DIAGNOSIS — J90 PLEURAL EFFUSION, NOT ELSEWHERE CLASSIFIED: ICD-10-CM

## 2017-02-08 DIAGNOSIS — Z87.891 PERSONAL HISTORY OF NICOTINE DEPENDENCE: ICD-10-CM

## 2017-02-08 DIAGNOSIS — J11.1 INFLUENZA DUE TO UNIDENTIFIED INFLUENZA VIRUS WITH OTHER RESPIRATORY MANIFESTATIONS: ICD-10-CM

## 2017-02-08 DIAGNOSIS — I26.99 OTHER PULMONARY EMBOLISM WITHOUT ACUTE COR PULMONALE: ICD-10-CM

## 2017-02-08 DIAGNOSIS — Z98.61 CORONARY ANGIOPLASTY STATUS: ICD-10-CM

## 2017-02-08 DIAGNOSIS — D73.5 INFARCTION OF SPLEEN: ICD-10-CM

## 2017-02-08 DIAGNOSIS — Z79.82 LONG TERM (CURRENT) USE OF ASPIRIN: ICD-10-CM

## 2017-02-08 DIAGNOSIS — A41.9 SEPSIS, UNSPECIFIED ORGANISM: ICD-10-CM

## 2017-02-09 LAB
INR PPP: 1.65 RATIO
PT BLD: 18.7 SEC

## 2017-02-13 ENCOUNTER — APPOINTMENT (OUTPATIENT)
Dept: INTERNAL MEDICINE | Facility: CLINIC | Age: 52
End: 2017-02-13

## 2017-02-13 ENCOUNTER — APPOINTMENT (OUTPATIENT)
Dept: HEMATOLOGY ONCOLOGY | Facility: CLINIC | Age: 52
End: 2017-02-13

## 2017-02-13 VITALS
HEART RATE: 94 BPM | SYSTOLIC BLOOD PRESSURE: 120 MMHG | HEIGHT: 72 IN | RESPIRATION RATE: 14 BRPM | WEIGHT: 224 LBS | TEMPERATURE: 98.1 F | DIASTOLIC BLOOD PRESSURE: 90 MMHG | OXYGEN SATURATION: 99 % | BODY MASS INDEX: 30.34 KG/M2

## 2017-02-13 DIAGNOSIS — E55.9 VITAMIN D DEFICIENCY, UNSPECIFIED: ICD-10-CM

## 2017-02-13 LAB — INR PPP: 2.3 RATIO

## 2017-02-15 ENCOUNTER — RX RENEWAL (OUTPATIENT)
Age: 52
End: 2017-02-15

## 2017-02-16 ENCOUNTER — APPOINTMENT (OUTPATIENT)
Dept: INTERNAL MEDICINE | Facility: CLINIC | Age: 52
End: 2017-02-16

## 2017-02-16 LAB — INR PPP: 2.6 RATIO

## 2017-02-16 RX ORDER — OXYCODONE AND ACETAMINOPHEN 5; 325 MG/1; MG/1
5-325 TABLET ORAL
Qty: 20 | Refills: 0 | Status: DISCONTINUED | COMMUNITY
Start: 2017-01-26 | End: 2017-02-16

## 2017-02-23 ENCOUNTER — APPOINTMENT (OUTPATIENT)
Dept: INTERNAL MEDICINE | Facility: CLINIC | Age: 52
End: 2017-02-23

## 2017-02-23 LAB
INR PPP: 3.6 RATIO
QUALITY CONTROL: YES

## 2017-02-27 ENCOUNTER — APPOINTMENT (OUTPATIENT)
Dept: INTERNAL MEDICINE | Facility: CLINIC | Age: 52
End: 2017-02-27

## 2017-02-27 LAB
INR PPP: 4 RATIO
QUALITY CONTROL: YES

## 2017-03-07 ENCOUNTER — APPOINTMENT (OUTPATIENT)
Dept: INTERNAL MEDICINE | Facility: CLINIC | Age: 52
End: 2017-03-07

## 2017-03-07 VITALS — DIASTOLIC BLOOD PRESSURE: 90 MMHG | SYSTOLIC BLOOD PRESSURE: 124 MMHG | HEART RATE: 88 BPM

## 2017-03-07 LAB — INR PPP: 3.6 RATIO

## 2017-03-16 ENCOUNTER — APPOINTMENT (OUTPATIENT)
Dept: INTERNAL MEDICINE | Facility: CLINIC | Age: 52
End: 2017-03-16

## 2017-03-16 VITALS — DIASTOLIC BLOOD PRESSURE: 74 MMHG | SYSTOLIC BLOOD PRESSURE: 120 MMHG

## 2017-03-16 LAB — INR PPP: 1.4 RATIO

## 2017-03-22 ENCOUNTER — APPOINTMENT (OUTPATIENT)
Dept: INTERNAL MEDICINE | Facility: CLINIC | Age: 52
End: 2017-03-22

## 2017-03-22 VITALS — SYSTOLIC BLOOD PRESSURE: 136 MMHG | DIASTOLIC BLOOD PRESSURE: 86 MMHG | HEART RATE: 88 BPM

## 2017-03-22 LAB — INR PPP: 2.3 RATIO

## 2017-03-27 ENCOUNTER — LABORATORY RESULT (OUTPATIENT)
Age: 52
End: 2017-03-27

## 2017-03-27 ENCOUNTER — APPOINTMENT (OUTPATIENT)
Dept: HEMATOLOGY ONCOLOGY | Facility: CLINIC | Age: 52
End: 2017-03-27

## 2017-03-27 VITALS
HEART RATE: 104 BPM | BODY MASS INDEX: 30.88 KG/M2 | OXYGEN SATURATION: 98 % | SYSTOLIC BLOOD PRESSURE: 140 MMHG | RESPIRATION RATE: 16 BRPM | DIASTOLIC BLOOD PRESSURE: 90 MMHG | TEMPERATURE: 98.4 F | WEIGHT: 228 LBS | HEIGHT: 72 IN

## 2017-03-27 DIAGNOSIS — R73.03 PREDIABETES.: ICD-10-CM

## 2017-03-28 LAB
25(OH)D3 SERPL-MCNC: 15.3 NG/ML
BASOPHILS # BLD AUTO: 0.08 K/UL
BASOPHILS NFR BLD AUTO: 0.5 %
DEPRECATED D DIMER PPP IA-ACNC: 177 NG/ML DDU
EOSINOPHIL # BLD AUTO: 0.19 K/UL
EOSINOPHIL NFR BLD AUTO: 1.2 %
ERYTHROCYTE [SEDIMENTATION RATE] IN BLOOD BY WESTERGREN METHOD: 35 MM/HR
FERRITIN SERPL-MCNC: 295.2 NG/ML
HAPTOGLOB SERPL-MCNC: 271 MG/DL
HCT VFR BLD CALC: 42.5 %
HCYS SERPL-MCNC: 15.1 UMOL/L
HGB BLD-MCNC: 14.1 G/DL
IMM GRANULOCYTES NFR BLD AUTO: 0.3 %
IRON SATN MFR SERPL: 12 %
IRON SERPL-MCNC: 39 UG/DL
LDH SERPL-CCNC: 212 U/L
LYMPHOCYTES # BLD AUTO: 3.32 K/UL
LYMPHOCYTES NFR BLD AUTO: 21.4 %
MAN DIFF?: NORMAL
MCHC RBC-ENTMCNC: 30 PG
MCHC RBC-ENTMCNC: 33.2 GM/DL
MCV RBC AUTO: 90.4 FL
MONOCYTES # BLD AUTO: 1.07 K/UL
MONOCYTES NFR BLD AUTO: 6.9 %
NEUTROPHILS # BLD AUTO: 10.8 K/UL
NEUTROPHILS NFR BLD AUTO: 69.7 %
PLATELET # BLD AUTO: 229 K/UL
RBC # BLD: 4.7 M/UL
RBC # FLD: 14.1 %
TIBC SERPL-MCNC: 319 UG/DL
UIBC SERPL-MCNC: 280 UG/DL
VIT B12 SERPL-MCNC: 206 PG/ML
WBC # FLD AUTO: 15.5 K/UL

## 2017-03-29 ENCOUNTER — APPOINTMENT (OUTPATIENT)
Dept: HEMATOLOGY ONCOLOGY | Facility: CLINIC | Age: 52
End: 2017-03-29

## 2017-03-29 VITALS
RESPIRATION RATE: 14 BRPM | OXYGEN SATURATION: 98 % | BODY MASS INDEX: 30.88 KG/M2 | HEIGHT: 72 IN | DIASTOLIC BLOOD PRESSURE: 80 MMHG | TEMPERATURE: 98.1 F | SYSTOLIC BLOOD PRESSURE: 130 MMHG | HEART RATE: 100 BPM | WEIGHT: 228 LBS

## 2017-03-29 RX ORDER — CYANOCOBALAMIN 1000 UG/ML
1000 INJECTION INTRAMUSCULAR; SUBCUTANEOUS
Qty: 0 | Refills: 0 | Status: COMPLETED | OUTPATIENT
Start: 2017-03-29

## 2017-03-29 RX ADMIN — CYANOCOBALAMIN 0 MCG/ML: 1000 INJECTION INTRAMUSCULAR; SUBCUTANEOUS at 00:00

## 2017-03-31 ENCOUNTER — APPOINTMENT (OUTPATIENT)
Dept: INTERNAL MEDICINE | Facility: CLINIC | Age: 52
End: 2017-03-31

## 2017-03-31 VITALS — OXYGEN SATURATION: 99 % | HEART RATE: 88 BPM | DIASTOLIC BLOOD PRESSURE: 84 MMHG | SYSTOLIC BLOOD PRESSURE: 230 MMHG

## 2017-03-31 LAB — INR PPP: 2.1 RATIO

## 2017-04-05 ENCOUNTER — INPATIENT (INPATIENT)
Facility: HOSPITAL | Age: 52
LOS: 0 days | Discharge: HOME | DRG: 392 | End: 2017-04-06
Attending: SURGERY | Admitting: SURGERY
Payer: COMMERCIAL

## 2017-04-05 VITALS
TEMPERATURE: 98 F | HEIGHT: 73 IN | HEART RATE: 108 BPM | DIASTOLIC BLOOD PRESSURE: 95 MMHG | WEIGHT: 233.69 LBS | RESPIRATION RATE: 18 BRPM | SYSTOLIC BLOOD PRESSURE: 170 MMHG | OXYGEN SATURATION: 99 %

## 2017-04-05 LAB
ALBUMIN SERPL ELPH-MCNC: 3.4 G/DL — SIGNIFICANT CHANGE UP (ref 3.4–5)
ALP SERPL-CCNC: 130 U/L — HIGH (ref 40–120)
ALT FLD-CCNC: 50 U/L — HIGH (ref 12–42)
ANION GAP SERPL CALC-SCNC: 9 MMOL/L — SIGNIFICANT CHANGE UP (ref 9–16)
APPEARANCE UR: CLEAR — SIGNIFICANT CHANGE UP
APTT BLD: 45.2 SEC — HIGH (ref 27.5–37.4)
AST SERPL-CCNC: 29 U/L — SIGNIFICANT CHANGE UP (ref 15–37)
BASOPHILS NFR BLD AUTO: 0.5 % — SIGNIFICANT CHANGE UP (ref 0–2)
BILIRUB DIRECT SERPL-MCNC: 0.07 MG/DL — SIGNIFICANT CHANGE UP
BILIRUB INDIRECT FLD-MCNC: 0.3 MG/DL — SIGNIFICANT CHANGE UP (ref 0.2–1)
BILIRUB SERPL-MCNC: 0.4 MG/DL — SIGNIFICANT CHANGE UP (ref 0.2–1.2)
BILIRUB SERPL-MCNC: 0.4 MG/DL — SIGNIFICANT CHANGE UP (ref 0.2–1.2)
BILIRUB UR-MCNC: NEGATIVE — SIGNIFICANT CHANGE UP
BLD GP AB SCN SERPL QL: NEGATIVE — SIGNIFICANT CHANGE UP
BUN SERPL-MCNC: 13 MG/DL — SIGNIFICANT CHANGE UP (ref 7–23)
CALCIUM SERPL-MCNC: 9 MG/DL — SIGNIFICANT CHANGE UP (ref 8.5–10.5)
CHLORIDE SERPL-SCNC: 100 MMOL/L — SIGNIFICANT CHANGE UP (ref 96–108)
CO2 SERPL-SCNC: 27 MMOL/L — SIGNIFICANT CHANGE UP (ref 22–31)
COLOR SPEC: YELLOW — SIGNIFICANT CHANGE UP
CREAT SERPL-MCNC: 0.62 MG/DL — SIGNIFICANT CHANGE UP (ref 0.5–1.3)
DIFF PNL FLD: (no result)
EOSINOPHIL NFR BLD AUTO: 2.3 % — SIGNIFICANT CHANGE UP (ref 0–6)
ETHANOL SERPL-MCNC: <3 MG/DL — SIGNIFICANT CHANGE UP
GLUCOSE SERPL-MCNC: 172 MG/DL — HIGH (ref 70–99)
GLUCOSE UR QL: NEGATIVE — SIGNIFICANT CHANGE UP
HCT VFR BLD CALC: 39 % — SIGNIFICANT CHANGE UP (ref 39–50)
HGB BLD-MCNC: 13.6 G/DL — SIGNIFICANT CHANGE UP (ref 13–17)
INR BLD: 2.97 — HIGH (ref 0.88–1.16)
KETONES UR-MCNC: NEGATIVE — SIGNIFICANT CHANGE UP
LACTATE SERPL-SCNC: 1.8 MMOL/L — SIGNIFICANT CHANGE UP (ref 0.5–2)
LEUKOCYTE ESTERASE UR-ACNC: NEGATIVE — SIGNIFICANT CHANGE UP
LIDOCAIN IGE QN: 289 U/L — SIGNIFICANT CHANGE UP (ref 73–393)
LYMPHOCYTES # BLD AUTO: 22.4 % — SIGNIFICANT CHANGE UP (ref 13–44)
MAGNESIUM SERPL-MCNC: 1.9 MG/DL — SIGNIFICANT CHANGE UP (ref 1.6–2.4)
MCHC RBC-ENTMCNC: 30.6 PG — SIGNIFICANT CHANGE UP (ref 27–34)
MCHC RBC-ENTMCNC: 34.9 G/DL — SIGNIFICANT CHANGE UP (ref 32–36)
MCV RBC AUTO: 87.8 FL — SIGNIFICANT CHANGE UP (ref 80–100)
MONOCYTES NFR BLD AUTO: 6.9 % — SIGNIFICANT CHANGE UP (ref 2–14)
NEUTROPHILS NFR BLD AUTO: 67.9 % — SIGNIFICANT CHANGE UP (ref 43–77)
NITRITE UR-MCNC: NEGATIVE — SIGNIFICANT CHANGE UP
PCP SPEC-MCNC: SIGNIFICANT CHANGE UP
PH UR: 5.5 — SIGNIFICANT CHANGE UP (ref 4–8)
PLATELET # BLD AUTO: 243 K/UL — SIGNIFICANT CHANGE UP (ref 150–400)
POTASSIUM SERPL-MCNC: 3.5 MMOL/L — SIGNIFICANT CHANGE UP (ref 3.5–5.3)
POTASSIUM SERPL-SCNC: 3.5 MMOL/L — SIGNIFICANT CHANGE UP (ref 3.5–5.3)
PROT SERPL-MCNC: 7.2 G/DL — SIGNIFICANT CHANGE UP (ref 6.4–8.2)
PROT UR-MCNC: NEGATIVE MG/DL — SIGNIFICANT CHANGE UP
PROTHROM AB SERPL-ACNC: 33.7 SEC — HIGH (ref 9.8–12.7)
RBC # BLD: 4.44 M/UL — SIGNIFICANT CHANGE UP (ref 4.2–5.8)
RBC # FLD: 13.2 % — SIGNIFICANT CHANGE UP (ref 10.3–16.9)
RH IG SCN BLD-IMP: POSITIVE — SIGNIFICANT CHANGE UP
SODIUM SERPL-SCNC: 136 MMOL/L — SIGNIFICANT CHANGE UP (ref 135–145)
SP GR SPEC: 1.02 — SIGNIFICANT CHANGE UP (ref 1–1.03)
UROBILINOGEN FLD QL: 0.2 E.U./DL — SIGNIFICANT CHANGE UP
WBC # BLD: 11.7 K/UL — HIGH (ref 3.8–10.5)
WBC # FLD AUTO: 11.7 K/UL — HIGH (ref 3.8–10.5)

## 2017-04-05 PROCEDURE — 99285 EMERGENCY DEPT VISIT HI MDM: CPT

## 2017-04-05 PROCEDURE — 74177 CT ABD & PELVIS W/CONTRAST: CPT | Mod: 26

## 2017-04-05 RX ORDER — HYDROMORPHONE HYDROCHLORIDE 2 MG/ML
1 INJECTION INTRAMUSCULAR; INTRAVENOUS; SUBCUTANEOUS ONCE
Qty: 0 | Refills: 0 | Status: DISCONTINUED | OUTPATIENT
Start: 2017-04-05 | End: 2017-04-05

## 2017-04-05 RX ORDER — PANTOPRAZOLE SODIUM 20 MG/1
40 TABLET, DELAYED RELEASE ORAL ONCE
Qty: 0 | Refills: 0 | Status: COMPLETED | OUTPATIENT
Start: 2017-04-05 | End: 2017-04-05

## 2017-04-05 RX ORDER — DILTIAZEM HCL 120 MG
120 CAPSULE, EXT RELEASE 24 HR ORAL DAILY
Qty: 0 | Refills: 0 | Status: DISCONTINUED | OUTPATIENT
Start: 2017-04-05 | End: 2017-04-06

## 2017-04-05 RX ORDER — PANTOPRAZOLE SODIUM 20 MG/1
40 TABLET, DELAYED RELEASE ORAL DAILY
Qty: 0 | Refills: 0 | Status: DISCONTINUED | OUTPATIENT
Start: 2017-04-06 | End: 2017-04-06

## 2017-04-05 RX ORDER — SODIUM CHLORIDE 9 MG/ML
1000 INJECTION, SOLUTION INTRAVENOUS
Qty: 0 | Refills: 0 | Status: DISCONTINUED | OUTPATIENT
Start: 2017-04-05 | End: 2017-04-06

## 2017-04-05 RX ORDER — HYDROMORPHONE HYDROCHLORIDE 2 MG/ML
0.5 INJECTION INTRAMUSCULAR; INTRAVENOUS; SUBCUTANEOUS EVERY 4 HOURS
Qty: 0 | Refills: 0 | Status: DISCONTINUED | OUTPATIENT
Start: 2017-04-05 | End: 2017-04-06

## 2017-04-05 RX ORDER — HYDROMORPHONE HYDROCHLORIDE 2 MG/ML
1 INJECTION INTRAMUSCULAR; INTRAVENOUS; SUBCUTANEOUS EVERY 4 HOURS
Qty: 0 | Refills: 0 | Status: DISCONTINUED | OUTPATIENT
Start: 2017-04-05 | End: 2017-04-06

## 2017-04-05 RX ADMIN — HYDROMORPHONE HYDROCHLORIDE 1 MILLIGRAM(S): 2 INJECTION INTRAMUSCULAR; INTRAVENOUS; SUBCUTANEOUS at 22:45

## 2017-04-05 RX ADMIN — PANTOPRAZOLE SODIUM 40 MILLIGRAM(S): 20 TABLET, DELAYED RELEASE ORAL at 23:37

## 2017-04-05 NOTE — ED ADULT TRIAGE NOTE - CHIEF COMPLAINT QUOTE
C/O of pain to the LUQ, reports that he has a hematoma on the spleen and left segmental spleen , on coumadin. Pain started suddenly an hr ago, reports a pulsating feeling. No blood in stool or vomit.

## 2017-04-05 NOTE — ED ADULT TRIAGE NOTE - HEIGHT IN INCHES
Feeds 22 kcal fortified EBM/ neosure 40 ml q 3 hrs via ogt.  CBC next week  ptd: car seat challenge, CCHD screen, ABR   parental support 1

## 2017-04-05 NOTE — H&P ADULT - NSHPPHYSICALEXAM_GEN_ALL_CORE
Vital Signs Last 24 Hrs  T(C): 37.3, Max: 37.3 (04-05 @ 22:46)  T(F): 99.1, Max: 99.1 (04-05 @ 22:46)  HR: 104 (104 - 108) 86 during my examination  BP: 159/91 (159/91 - 170/95)  BP(mean): --  RR: 18 (18 - 18), 28 during my examination  SpO2: 99% (99% - 99%)    Physical Exam:   Gen: AOx3, uncomfortable due to pain  Cor: RRR, +S1S2, no MRG  Pulm: CTA b/l No W/R/S  Abd: +BS, obese, non distended, TTP in upper abdomen with localized voluntary guarding in epigastrium  Ext: WWP, edema +1, DP +2 b/l

## 2017-04-05 NOTE — ED ADULT NURSE NOTE - OBJECTIVE STATEMENT
Patient arrived to ED via walk-in stating, "About 3 hours ago, my stomach started hurting."  Patient is A&Ox3, in no visible acute distress, complaining of 10/10 LUQ and RUQ abdominal pain and nausea.  Patient denies chest pain, SOB, vomiting, diarrhea, fever, chills or cough.  PMH of CAD, Peripheral Neuropathy, DM and Splenic hematoma.  PIV placed, labs drawn and sent. Will continue with plan of care.

## 2017-04-05 NOTE — H&P ADULT - HISTORY OF PRESENT ILLNESS
51M w/PMH of CAD, MI s/p angioplasty 10 years on ASA 81, HTN, and chronic pancreatitis 2/2 to EtOH abuse(drinks 1 beer per day), atraumatic splenic hematoma treated non operatively(NICOLE drain placed on 2/1/17 removed 2/2/17), segmental PE on coumadin presents to [facility] ED today with abdominal pain that began about 5 hours ago after having canned spaghettis and meatballs and a can of beer after Denies N/V/C.      On arrival today, patient with low grade temp, VSS: T 100.7  /89 RR 20 O2 96% on RA.  Hemoglobin stable since prior admission.  Repeat CT scan shows mild progression of hematoma degradation but no significant change.  CT chest with moderate L-sided effusion w/atelectasis. 51M w/PMH of CAD, MI s/p angioplasty 10 years on ASA 81, HTN, and chronic pancreatitis 2/2 to EtOH abuse(drinks 1 beer per day), atraumatic splenic hematoma treated non operatively(NICOLE drain placed on 2/1/17 removed 2/2/17), segmental PE on coumadin presents to [facility] ED today with abdominal pain that began about 5 hours ago after having canned spaghettis and meatballs and a can of beer. Says pain was initially located in LUQ, tho now it is spread along his upper abdomen. Constant, severe, stabbing, worsened with deep inspirations and sitting, mild alleviation with supine positioning. Admits to sweats when the pain started, no fevers or chills. Denies N/V/C. Denies cp/sob/palp

## 2017-04-05 NOTE — ED PROVIDER NOTE - OBJECTIVE STATEMENT
patient with history atraumatic splenic hematoma this year with procedural stent placement and currently remaining on coumadin states now I day increased and renewed pain to abdomen especially LUQ no fever

## 2017-04-05 NOTE — H&P ADULT - NSHPLABSRESULTS_GEN_ALL_CORE
COMPARISON: CT abdomen and pelvis 1/29/2017; MRI abdomen 6/9/2016.    FINDINGS: Images of the lower chest demonstrate dense coronary artery   calcifications. There has been interval resolution of a previously   identified left pleural effusion. There is minimal bibasilar dependent   atelectasis. Right middle lobe calcification granuloma again noted,   unchanged.    No focal hepatic lesions are seen. No radiopaque stones are seen in the   contracted gallbladder. There is again mild thickening of the distal   pancreatic tail with infiltration of the surrounding peripancreatic fat.   There is a 0.9 x 0.8 x 1.3 cm hypodensity in the distal pancreatic tail,   previously 1.0 x 1.0 x 1.1 cm on 1/29/2017. Prior MRI dated 6/9/2016   demonstrates this lesion to likely reflect walled off necrosis or   potentially inflammatory cyst.    There is again an irregular subcapsular fluid collection surrounding the   spleen and extending minimally into the left paracolic gutter and left   anterior pararenal space. Extension to the anterior superior margin of   the spleen is new compared to 1/29/2017. This lesion appears more dense   and heterogeneous compared to prior study. No significant rim enhancement   is seen. There are again nonspecific splenic parenchymal hypodensities   which may reflect benign flow dynamics (such as red pulp and white pulp)   versus additional intraparenchymal hemorrhage.    There is nonspecific thickening of the bilateral adrenal glands, left   greater than right. The kidneys are normal in appearance.    No abdominal aortic aneurysm is seen. Moderate calcified plaque is noted   of the visualized aorta and its pelvic branches. Thereare increased   number of nonenlarged retroperitoneal lymph nodes in the left periaortic   station measuring up to 0.9 cm in short axis, unchanged.    Evaluation of the bowel is colonic diverticulosis without evidence of   acute diverticulitis. Normal appendix. No obstruction. No ascites is   seen. Subcentimeter D2 segment duodenal lipoma is again noted.    Images of the pelvis demonstrate the prostate and seminal vesicles to be   normal in appearance. No filling defects are seen in the urinary bladder.    Evaluation of the osseous structures demonstrates degenerative changes,   most advanced at the sacroiliac joints and L5/S1 facets.      IMPRESSION:  1.  Redemonstration of an irregular subcapsular fluid collection   surrounding the spleen, extending minimally into the left paracolic   gutter and left anterior pararenal spaces. Extension to the anterior   superior margin of the spleen is new compared to 1/29/2017. This lesion   appears more dense and heterogeneous compared to prior study. No   significant rim enhancement appreciated. There are again nonspecific   splenic parenchymal hypodensities which may reflect benign flow dynamics   (such as red pulp and white pulp) versus additional intraparenchymal   hemorrhage.  2.  Thickeningof the distal pancreatic tail with infiltration of the   surrounding peripancreatic fat, not significantly changed from   01/29/2017. Correlation with pancreatic enzymes is recommended to exclude   acute pancreatitis.  3.  0.9 x 0.8 x 1.3 cm distal pancreatic hypodensity, previously 1.0 x   1.0 x 1.1 cm on 1/29/2017. MRI dated 6/9/2016 demonstrates this lesion to   likely reflect walled-off necrosis or potentially inflammatory cyst.  4.  Colonic diverticulosis without acute diverticulitis.

## 2017-04-05 NOTE — H&P ADULT - ASSESSMENT
51M pt with above history now with new anterior splenic fluid collection with pancreatic tail inflammation, might be reactive in nature, though patient on coumadin for PE concerns for hemorrhage, h/h stable    -Admit to Surgery, [facility]  -NPO  -IVF  -PRN pain meds  -Holding home warfarin  -Protonix  -SCDs only  -OOB ambulation  -Home meds(holding statin) 51M pt with above history now with new anterior splenic fluid collection with pancreatic tail inflammation, might be reactive in nature, patient on coumadin for PE concerns for hemorrhage, h/h stable    -Admit to Surgery, [facility]  -NPO  -IVF  -PRN pain meds  -Holding home warfarin  -Protonix  -SCDs only  -OOB ambulation  -Home meds(holding statin)  -Trend CBC(next am) 51M pt with above history now with new anterior splenic fluid collection with pancreatic tail inflammation, might be reactive in nature, patient on coumadin for PE concerns for hemorrhage, h/h stable    -Admit to Surgery, [facility]  -NPO  -IVF  -PRN pain meds  -Holding home warfarin  -Protonix  -SCDs only  -OOB ambulation  -Home meds(holding statin)  -Trend CBC(next am)  -CIWA protocol

## 2017-04-05 NOTE — ED PROVIDER NOTE - ATTENDING CONTRIBUTION TO CARE
50 yo M hx of CAD, atraumatic splenic hematoma with possible infection s/p surgical drain in Jan '17, PE currently on coumadin, presenting with spontaneous atraumatic LUQ pain prior to arrival.  No fever, chills or sob.  TTP LUQ, no rebound or guarding.  HD noted, slightly tachycardic.  Concern for recurrent rupture, hematoma, bleeding, infection, kidney stone.  Unlikely repeat PE, dissection, aaa, ptx given context.  Plan labs, CT, IVF and reassess need for surgical consult, further tx and or admission.

## 2017-04-06 ENCOUNTER — TRANSCRIPTION ENCOUNTER (OUTPATIENT)
Age: 52
End: 2017-04-06

## 2017-04-06 VITALS
TEMPERATURE: 98 F | RESPIRATION RATE: 16 BRPM | DIASTOLIC BLOOD PRESSURE: 76 MMHG | SYSTOLIC BLOOD PRESSURE: 111 MMHG | HEART RATE: 90 BPM | OXYGEN SATURATION: 97 %

## 2017-04-06 LAB
ALBUMIN SERPL ELPH-MCNC: 3.1 G/DL — LOW (ref 3.4–5)
ALP SERPL-CCNC: 110 U/L — SIGNIFICANT CHANGE UP (ref 40–120)
ALT FLD-CCNC: 40 U/L — SIGNIFICANT CHANGE UP (ref 12–42)
ANION GAP SERPL CALC-SCNC: 8 MMOL/L — LOW (ref 9–16)
APTT BLD: 40.6 SEC — HIGH (ref 27.5–37.4)
AST SERPL-CCNC: 15 U/L — SIGNIFICANT CHANGE UP (ref 15–37)
BASOPHILS NFR BLD AUTO: 0.4 % — SIGNIFICANT CHANGE UP (ref 0–2)
BILIRUB SERPL-MCNC: 1.1 MG/DL — SIGNIFICANT CHANGE UP (ref 0.2–1.2)
BUN SERPL-MCNC: 10 MG/DL — SIGNIFICANT CHANGE UP (ref 7–23)
CALCIUM SERPL-MCNC: 9 MG/DL — SIGNIFICANT CHANGE UP (ref 8.5–10.5)
CHLORIDE SERPL-SCNC: 100 MMOL/L — SIGNIFICANT CHANGE UP (ref 96–108)
CO2 SERPL-SCNC: 28 MMOL/L — SIGNIFICANT CHANGE UP (ref 22–31)
CREAT SERPL-MCNC: 0.66 MG/DL — SIGNIFICANT CHANGE UP (ref 0.5–1.3)
EOSINOPHIL NFR BLD AUTO: 2 % — SIGNIFICANT CHANGE UP (ref 0–6)
GLUCOSE SERPL-MCNC: 116 MG/DL — HIGH (ref 70–99)
HCT VFR BLD CALC: 37.3 % — LOW (ref 39–50)
HGB BLD-MCNC: 12.6 G/DL — LOW (ref 13–17)
INR BLD: 2.71 — HIGH (ref 0.88–1.16)
LIDOCAIN IGE QN: 215 U/L — SIGNIFICANT CHANGE UP (ref 73–393)
LYMPHOCYTES # BLD AUTO: 23.7 % — SIGNIFICANT CHANGE UP (ref 13–44)
MCHC RBC-ENTMCNC: 30.1 PG — SIGNIFICANT CHANGE UP (ref 27–34)
MCHC RBC-ENTMCNC: 33.8 G/DL — SIGNIFICANT CHANGE UP (ref 32–36)
MCV RBC AUTO: 89 FL — SIGNIFICANT CHANGE UP (ref 80–100)
MONOCYTES NFR BLD AUTO: 9.6 % — SIGNIFICANT CHANGE UP (ref 2–14)
NEUTROPHILS NFR BLD AUTO: 64.3 % — SIGNIFICANT CHANGE UP (ref 43–77)
PLATELET # BLD AUTO: 225 K/UL — SIGNIFICANT CHANGE UP (ref 150–400)
POTASSIUM SERPL-MCNC: 3.9 MMOL/L — SIGNIFICANT CHANGE UP (ref 3.5–5.3)
POTASSIUM SERPL-SCNC: 3.9 MMOL/L — SIGNIFICANT CHANGE UP (ref 3.5–5.3)
PROT SERPL-MCNC: 6.9 G/DL — SIGNIFICANT CHANGE UP (ref 6.4–8.2)
PROTHROM AB SERPL-ACNC: 30.7 SEC — HIGH (ref 9.8–12.7)
RBC # BLD: 4.19 M/UL — LOW (ref 4.2–5.8)
RBC # FLD: 13.6 % — SIGNIFICANT CHANGE UP (ref 10.3–16.9)
SODIUM SERPL-SCNC: 136 MMOL/L — SIGNIFICANT CHANGE UP (ref 135–145)
WBC # BLD: 11.7 K/UL — HIGH (ref 3.8–10.5)
WBC # FLD AUTO: 11.7 K/UL — HIGH (ref 3.8–10.5)

## 2017-04-06 PROCEDURE — 86850 RBC ANTIBODY SCREEN: CPT

## 2017-04-06 PROCEDURE — 85610 PROTHROMBIN TIME: CPT

## 2017-04-06 PROCEDURE — 83605 ASSAY OF LACTIC ACID: CPT

## 2017-04-06 PROCEDURE — 86901 BLOOD TYPING SEROLOGIC RH(D): CPT

## 2017-04-06 PROCEDURE — 36415 COLL VENOUS BLD VENIPUNCTURE: CPT

## 2017-04-06 PROCEDURE — 99285 EMERGENCY DEPT VISIT HI MDM: CPT | Mod: 25

## 2017-04-06 PROCEDURE — 83690 ASSAY OF LIPASE: CPT

## 2017-04-06 PROCEDURE — 84100 ASSAY OF PHOSPHORUS: CPT

## 2017-04-06 PROCEDURE — 81001 URINALYSIS AUTO W/SCOPE: CPT

## 2017-04-06 PROCEDURE — 80307 DRUG TEST PRSMV CHEM ANLYZR: CPT

## 2017-04-06 PROCEDURE — 85025 COMPLETE CBC W/AUTO DIFF WBC: CPT

## 2017-04-06 PROCEDURE — 80053 COMPREHEN METABOLIC PANEL: CPT

## 2017-04-06 PROCEDURE — 85730 THROMBOPLASTIN TIME PARTIAL: CPT

## 2017-04-06 PROCEDURE — 74177 CT ABD & PELVIS W/CONTRAST: CPT

## 2017-04-06 PROCEDURE — 82248 BILIRUBIN DIRECT: CPT

## 2017-04-06 PROCEDURE — 81003 URINALYSIS AUTO W/O SCOPE: CPT

## 2017-04-06 PROCEDURE — 86900 BLOOD TYPING SEROLOGIC ABO: CPT

## 2017-04-06 PROCEDURE — 82247 BILIRUBIN TOTAL: CPT

## 2017-04-06 PROCEDURE — 96374 THER/PROPH/DIAG INJ IV PUSH: CPT | Mod: XU

## 2017-04-06 PROCEDURE — 83735 ASSAY OF MAGNESIUM: CPT

## 2017-04-06 PROCEDURE — 84484 ASSAY OF TROPONIN QUANT: CPT

## 2017-04-06 RX ORDER — HYDROMORPHONE HYDROCHLORIDE 2 MG/ML
0.5 INJECTION INTRAMUSCULAR; INTRAVENOUS; SUBCUTANEOUS ONCE
Qty: 0 | Refills: 0 | Status: DISCONTINUED | OUTPATIENT
Start: 2017-04-06 | End: 2017-04-06

## 2017-04-06 RX ADMIN — HYDROMORPHONE HYDROCHLORIDE 1 MILLIGRAM(S): 2 INJECTION INTRAMUSCULAR; INTRAVENOUS; SUBCUTANEOUS at 08:57

## 2017-04-06 RX ADMIN — PANTOPRAZOLE SODIUM 40 MILLIGRAM(S): 20 TABLET, DELAYED RELEASE ORAL at 11:15

## 2017-04-06 RX ADMIN — Medication 120 MILLIGRAM(S): at 05:45

## 2017-04-06 RX ADMIN — HYDROMORPHONE HYDROCHLORIDE 1 MILLIGRAM(S): 2 INJECTION INTRAMUSCULAR; INTRAVENOUS; SUBCUTANEOUS at 05:05

## 2017-04-06 RX ADMIN — HYDROMORPHONE HYDROCHLORIDE 1 MILLIGRAM(S): 2 INJECTION INTRAMUSCULAR; INTRAVENOUS; SUBCUTANEOUS at 04:46

## 2017-04-06 RX ADMIN — HYDROMORPHONE HYDROCHLORIDE 1 MILLIGRAM(S): 2 INJECTION INTRAMUSCULAR; INTRAVENOUS; SUBCUTANEOUS at 08:43

## 2017-04-06 RX ADMIN — HYDROMORPHONE HYDROCHLORIDE 0.5 MILLIGRAM(S): 2 INJECTION INTRAMUSCULAR; INTRAVENOUS; SUBCUTANEOUS at 02:00

## 2017-04-06 RX ADMIN — HYDROMORPHONE HYDROCHLORIDE 0.5 MILLIGRAM(S): 2 INJECTION INTRAMUSCULAR; INTRAVENOUS; SUBCUTANEOUS at 01:21

## 2017-04-06 NOTE — DISCHARGE NOTE ADULT - CARE PLAN
Principal Discharge DX:	Left lower quadrant pain  Goal:	Recovery  Instructions for follow-up, activity and diet:	Please follow up in the office with either Dr Hu or the Acute Care Surgery clinic which can be reached at . It is located at [address] [place] [address].  Continue a regular diet as tolerated and continue all of your regularly prescribed medications.  If you develop worsening pain, food intolerance, nausea/vomiting or develop a fever >101.5, call your doctor or come to the ED. Principal Discharge DX:	Left lower quadrant pain  Goal:	Recovery  Instructions for follow-up, activity and diet:	Please follow up in the office with either Dr Hu or the Acute Care Surgery clinic which can be reached at . It is located at [address] [place] [address].  Continue a regular diet as tolerated and continue all of your regularly prescribed medications.  If you develop worsening pain, food intolerance, nausea/vomiting or develop a fever >101.5, call your doctor or come to the ED.

## 2017-04-06 NOTE — PROGRESS NOTE ADULT - SUBJECTIVE AND OBJECTIVE BOX
O/N: patient admitted for left lower quadrant pain , VSS , DORIE, Afebrile O/N: patient admitted for left lower quadrant pain , VSS , DORIE, Afebrile    SUBJECTIVE: Pt seen and examined at bedside. No overnight events.  Pain controlled. No f/c/n/v.    Vital Signs Last 24 Hrs  T(C): 36.8, Max: 37.3 ( @ 22:46)  T(F): 98.3, Max: 99.1 ( @ 22:46)  HR: 100 (100 - 108)  BP: 137/84 (135/88 - 170/95)  BP(mean): --  RR: 19 (18 - 19)  SpO2: 95% (95% - 99%)    PHYSICAL EXAM    Gen: NAD  CV: RRR  Pulm: no resp distress  Abd: Soft, epigastric tenderness, nondistended    I&O's Detail    I & Os for current day (as of 2017 07:39)  =============================================  IN:    lactated ringers.: 750 ml    Total IN: 750 ml  ---------------------------------------------  OUT:    Voided: 400 ml    Total OUT: 400 ml  ---------------------------------------------  Total NET: 350 ml      LABS:                        12.6   11.7  )-----------( 225      ( 2017 06:26 )             37.3     04-06    136  |  100  |  10  ----------------------------<  116<H>  3.9   |  28  |  0.66    Ca    9.0      2017 06:23  Phos  3.0     04-05  Mg     1.9     04-05    TPro  6.9  /  Alb  3.1<L>  /  TBili  1.1  /  DBili  x   /  AST  15  /  ALT  40  /  AlkPhos  110  04-06    PT/INR - ( 2017 06:26 )   PT: 30.7 sec;   INR: 2.71          PTT - ( 2017 06:26 )  PTT:40.6 sec  Urinalysis Basic - ( 2017 20:25 )    Color: Yellow / Appearance: Clear / S.025 / pH: x  Gluc: x / Ketone: NEGATIVE  / Bili: NEGATIVE / Urobili: 0.2 E.U./dL   Blood: x / Protein: NEGATIVE mg/dL / Nitrite: NEGATIVE   Leuk Esterase: NEGATIVE / RBC: < 5 /HPF / WBC < 5 /HPF   Sq Epi: x / Non Sq Epi: x / Bacteria: Present /HPF        MEDICATIONS  (STANDING):  lactated ringers. 1000milliLiter(s) IV Continuous <Continuous>  pantoprazole  Injectable 40milliGRAM(s) IV Push daily  diltiazem   CD 120milliGRAM(s) Oral daily    MEDICATIONS  (PRN):  HYDROmorphone  Injectable 0.5milliGRAM(s) IV Push every 4 hours PRN Moderate Pain  HYDROmorphone  Injectable 1milliGRAM(s) IV Push every 4 hours PRN Severe Pain  LORazepam   Injectable 0.5milliGRAM(s) IV Push every 4 hours PRN agitation, tremors, anxiety      RADIOLOGY & ADDITIONAL STUDIES:    ASSESSMENT AND PLAN

## 2017-04-06 NOTE — PROGRESS NOTE ADULT - ASSESSMENT
50 y/o male with left lower quadrant pain   -NPO  -IVF  -PRN pain meds  -Holding home warfarin  -Protonix  -SCDs only  -OOB ambulation

## 2017-04-06 NOTE — DISCHARGE NOTE ADULT - MEDICATION SUMMARY - MEDICATIONS TO TAKE
I will START or STAY ON the medications listed below when I get home from the hospital:    oxyCODONE-acetaminophen 5mg-325mg oral tablet  -- 1 tab(s) by mouth every 4 to 6 hours, As Needed -for severe pain MDD:6  -- Caution federal law prohibits the transfer of this drug to any person other  than the person for whom it was prescribed.  May cause drowsiness.  Alcohol may intensify this effect.  Use care when operating dangerous machinery.  This prescription cannot be refilled.  This product contains acetaminophen.  Do not use  with any other product containing acetaminophen to prevent possible liver damage.  Using more of this medication than prescribed may cause serious breathing problems.    -- Indication: For Pain    lisinopril 20 mg oral tablet  -- 1 tab(s) by mouth once a day  -- Indication: For HTN    diltiazem 24 hour extended release  -- 120 milligram(s) by mouth once a day  -- Indication: For Arrhythmia    Coumadin 5 mg oral tablet  -- 1 tab(s) by mouth once a day at bedtime  -- Indication: For Anticoagulation    simvastatin  --  by mouth   -- Indication: For HLD    Colace 100 mg oral capsule  -- 1 cap(s) by mouth 2 times a day, As Needed -for constipation  -- Medication should be taken with plenty of water.    -- Indication: For Stool softener    NexIUM  --  by mouth   -- Indication: For GERD

## 2017-04-06 NOTE — DISCHARGE NOTE ADULT - PLAN OF CARE
Recovery Please follow up in the office with either Dr Hu or the Acute Care Surgery clinic which can be reached at . It is located at [address] [place] [address].  Continue a regular diet as tolerated and continue all of your regularly prescribed medications.  If you develop worsening pain, food intolerance, nausea/vomiting or develop a fever >101.5, call your doctor or come to the ED.

## 2017-04-06 NOTE — DISCHARGE NOTE ADULT - HOSPITAL COURSE
51M w/PMH of CAD, MI s/p angioplasty 10 years on ASA 81, HTN, and chronic pancreatitis 2/2 to EtOH abuse(drinks 1 beer per day), atraumatic splenic hematoma treated non operatively(NICOLE drain placed on 2/1/17 removed 2/2/17), segmental PE on coumadin presented to [facility] ED with abdominal pain that began about 5 hours ago prior. He was admitted for observation. Labs were normal and his pain improved. He was given a PO trial which he tolerated. He will be discharged home with f/u in the office.

## 2017-04-11 ENCOUNTER — APPOINTMENT (OUTPATIENT)
Dept: INTERNAL MEDICINE | Facility: CLINIC | Age: 52
End: 2017-04-11

## 2017-04-11 DIAGNOSIS — F19.10 OTHER PSYCHOACTIVE SUBSTANCE ABUSE, UNCOMPLICATED: ICD-10-CM

## 2017-04-11 DIAGNOSIS — Z86.711 PERSONAL HISTORY OF PULMONARY EMBOLISM: ICD-10-CM

## 2017-04-11 DIAGNOSIS — D73.89 OTHER DISEASES OF SPLEEN: ICD-10-CM
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
## 2017-04-11 DIAGNOSIS — R10.9 UNSPECIFIED ABDOMINAL PAIN: ICD-10-CM

## 2017-04-11 DIAGNOSIS — Z81.8 FAMILY HISTORY OF OTHER MENTAL AND BEHAVIORAL DISORDERS: ICD-10-CM

## 2017-04-11 DIAGNOSIS — F10.10 ALCOHOL ABUSE, UNCOMPLICATED: ICD-10-CM

## 2017-04-11 DIAGNOSIS — I25.10 ATHEROSCLEROTIC HEART DISEASE OF NATIVE CORONARY ARTERY WITHOUT ANGINA PECTORIS: ICD-10-CM

## 2017-04-11 DIAGNOSIS — K57.30 DIVERTICULOSIS OF LARGE INTESTINE WITHOUT PERFORATION OR ABSCESS WITHOUT BLEEDING: ICD-10-CM

## 2017-04-11 DIAGNOSIS — I10 ESSENTIAL (PRIMARY) HYPERTENSION: ICD-10-CM

## 2017-04-11 DIAGNOSIS — E78.5 HYPERLIPIDEMIA, UNSPECIFIED: ICD-10-CM

## 2017-04-11 DIAGNOSIS — Z79.01 LONG TERM (CURRENT) USE OF ANTICOAGULANTS: ICD-10-CM

## 2017-04-11 DIAGNOSIS — K86.0 ALCOHOL-INDUCED CHRONIC PANCREATITIS: ICD-10-CM

## 2017-04-11 DIAGNOSIS — K21.9 GASTRO-ESOPHAGEAL REFLUX DISEASE WITHOUT ESOPHAGITIS: ICD-10-CM

## 2017-04-11 DIAGNOSIS — Z98.61 CORONARY ANGIOPLASTY STATUS: ICD-10-CM

## 2017-04-11 DIAGNOSIS — Z87.891 PERSONAL HISTORY OF NICOTINE DEPENDENCE: ICD-10-CM

## 2017-04-11 DIAGNOSIS — Z80.7 FAMILY HISTORY OF OTHER MALIGNANT NEOPLASMS OF LYMPHOID, HEMATOPOIETIC AND RELATED TISSUES: ICD-10-CM

## 2017-04-11 DIAGNOSIS — R10.32 LEFT LOWER QUADRANT PAIN: ICD-10-CM

## 2017-04-11 DIAGNOSIS — Z83.3 FAMILY HISTORY OF DIABETES MELLITUS: ICD-10-CM

## 2017-04-11 LAB — INR PPP: 1.3 RATIO

## 2017-04-12 ENCOUNTER — INPATIENT (INPATIENT)
Facility: HOSPITAL | Age: 52
LOS: 2 days | Discharge: HOME | DRG: 815 | End: 2017-04-15
Attending: HOSPITALIST | Admitting: HOSPITALIST
Payer: MEDICARE

## 2017-04-12 VITALS
DIASTOLIC BLOOD PRESSURE: 101 MMHG | OXYGEN SATURATION: 97 % | TEMPERATURE: 99 F | SYSTOLIC BLOOD PRESSURE: 145 MMHG | WEIGHT: 233.03 LBS | RESPIRATION RATE: 16 BRPM | HEART RATE: 108 BPM

## 2017-04-12 DIAGNOSIS — Z41.8 ENCOUNTER FOR OTHER PROCEDURES FOR PURPOSES OTHER THAN REMEDYING HEALTH STATE: ICD-10-CM

## 2017-04-12 DIAGNOSIS — R50.9 FEVER, UNSPECIFIED: ICD-10-CM

## 2017-04-12 DIAGNOSIS — I25.10 ATHEROSCLEROTIC HEART DISEASE OF NATIVE CORONARY ARTERY WITHOUT ANGINA PECTORIS: ICD-10-CM

## 2017-04-12 DIAGNOSIS — I10 ESSENTIAL (PRIMARY) HYPERTENSION: ICD-10-CM

## 2017-04-12 DIAGNOSIS — I26.99 OTHER PULMONARY EMBOLISM WITHOUT ACUTE COR PULMONALE: ICD-10-CM

## 2017-04-12 DIAGNOSIS — R19.7 DIARRHEA, UNSPECIFIED: ICD-10-CM

## 2017-04-12 DIAGNOSIS — S36.029S: ICD-10-CM

## 2017-04-12 DIAGNOSIS — R63.8 OTHER SYMPTOMS AND SIGNS CONCERNING FOOD AND FLUID INTAKE: ICD-10-CM

## 2017-04-12 LAB
ALBUMIN SERPL ELPH-MCNC: 2.9 G/DL — LOW (ref 3.4–5)
ALP SERPL-CCNC: 134 U/L — HIGH (ref 40–120)
ALT FLD-CCNC: 33 U/L — SIGNIFICANT CHANGE UP (ref 12–42)
AMYLASE P1 CFR SERPL: 32 U/L — SIGNIFICANT CHANGE UP (ref 25–115)
ANION GAP SERPL CALC-SCNC: 9 MMOL/L — SIGNIFICANT CHANGE UP (ref 9–16)
APPEARANCE UR: CLEAR — SIGNIFICANT CHANGE UP
APTT BLD: 32.8 SEC — SIGNIFICANT CHANGE UP (ref 27.5–37.4)
AST SERPL-CCNC: 21 U/L — SIGNIFICANT CHANGE UP (ref 15–37)
BACTERIA # UR AUTO: PRESENT /HPF
BASOPHILS NFR BLD AUTO: 0.6 % — SIGNIFICANT CHANGE UP (ref 0–2)
BILIRUB SERPL-MCNC: 0.3 MG/DL — SIGNIFICANT CHANGE UP (ref 0.2–1.2)
BILIRUB UR-MCNC: (no result)
BUN SERPL-MCNC: 13 MG/DL — SIGNIFICANT CHANGE UP (ref 7–23)
CALCIUM SERPL-MCNC: 9.1 MG/DL — SIGNIFICANT CHANGE UP (ref 8.5–10.5)
CHLORIDE SERPL-SCNC: 101 MMOL/L — SIGNIFICANT CHANGE UP (ref 96–108)
CO2 SERPL-SCNC: 28 MMOL/L — SIGNIFICANT CHANGE UP (ref 22–31)
COLOR SPEC: YELLOW — SIGNIFICANT CHANGE UP
CREAT SERPL-MCNC: 0.72 MG/DL — SIGNIFICANT CHANGE UP (ref 0.5–1.3)
D DIMER BLD IA.RAPID-MCNC: 2461 NG/ML DDU — HIGH
DIFF PNL FLD: NEGATIVE — SIGNIFICANT CHANGE UP
EOSINOPHIL NFR BLD AUTO: 2.2 % — SIGNIFICANT CHANGE UP (ref 0–6)
EPI CELLS # UR: SIGNIFICANT CHANGE UP /HPF
ETHANOL SERPL-MCNC: <3 MG/DL — SIGNIFICANT CHANGE UP
GLUCOSE SERPL-MCNC: 105 MG/DL — HIGH (ref 70–99)
GLUCOSE UR QL: NEGATIVE — SIGNIFICANT CHANGE UP
HCT VFR BLD CALC: 33.3 % — LOW (ref 39–50)
HGB BLD-MCNC: 11.5 G/DL — LOW (ref 13–17)
INR BLD: 1.46 — HIGH (ref 0.88–1.16)
KETONES UR-MCNC: NEGATIVE — SIGNIFICANT CHANGE UP
LACTATE SERPL-SCNC: 1.3 MMOL/L — SIGNIFICANT CHANGE UP (ref 0.5–2)
LEUKOCYTE ESTERASE UR-ACNC: (no result)
LIDOCAIN IGE QN: 297 U/L — SIGNIFICANT CHANGE UP (ref 73–393)
LYMPHOCYTES # BLD AUTO: 23.5 % — SIGNIFICANT CHANGE UP (ref 13–44)
MCHC RBC-ENTMCNC: 30.6 PG — SIGNIFICANT CHANGE UP (ref 27–34)
MCHC RBC-ENTMCNC: 34.5 G/DL — SIGNIFICANT CHANGE UP (ref 32–36)
MCV RBC AUTO: 88.6 FL — SIGNIFICANT CHANGE UP (ref 80–100)
MONOCYTES NFR BLD AUTO: 7.1 % — SIGNIFICANT CHANGE UP (ref 2–14)
NEUTROPHILS NFR BLD AUTO: 66.6 % — SIGNIFICANT CHANGE UP (ref 43–77)
NITRITE UR-MCNC: NEGATIVE — SIGNIFICANT CHANGE UP
PH UR: 6 — SIGNIFICANT CHANGE UP (ref 4–8)
PLATELET # BLD AUTO: 350 K/UL — SIGNIFICANT CHANGE UP (ref 150–400)
POTASSIUM SERPL-MCNC: 3.7 MMOL/L — SIGNIFICANT CHANGE UP (ref 3.5–5.3)
POTASSIUM SERPL-SCNC: 3.7 MMOL/L — SIGNIFICANT CHANGE UP (ref 3.5–5.3)
PROT SERPL-MCNC: 7.5 G/DL — SIGNIFICANT CHANGE UP (ref 6.4–8.2)
PROT UR-MCNC: NEGATIVE MG/DL — SIGNIFICANT CHANGE UP
PROTHROM AB SERPL-ACNC: 16.3 SEC — HIGH (ref 9.8–12.7)
RBC # BLD: 3.76 M/UL — LOW (ref 4.2–5.8)
RBC # FLD: 13.4 % — SIGNIFICANT CHANGE UP (ref 10.3–16.9)
RBC CASTS # UR COMP ASSIST: < 5 /HPF — SIGNIFICANT CHANGE UP
SODIUM SERPL-SCNC: 138 MMOL/L — SIGNIFICANT CHANGE UP (ref 135–145)
SP GR SPEC: 1.02 — SIGNIFICANT CHANGE UP (ref 1–1.03)
UROBILINOGEN FLD QL: 0.2 E.U./DL — SIGNIFICANT CHANGE UP
WBC # BLD: 11.1 K/UL — HIGH (ref 3.8–10.5)
WBC # FLD AUTO: 11.1 K/UL — HIGH (ref 3.8–10.5)
WBC UR QL: < 5 /HPF — SIGNIFICANT CHANGE UP

## 2017-04-12 PROCEDURE — 99285 EMERGENCY DEPT VISIT HI MDM: CPT | Mod: 25

## 2017-04-12 PROCEDURE — 74177 CT ABD & PELVIS W/CONTRAST: CPT | Mod: 26

## 2017-04-12 PROCEDURE — 99223 1ST HOSP IP/OBS HIGH 75: CPT

## 2017-04-12 PROCEDURE — 71010: CPT | Mod: 26

## 2017-04-12 PROCEDURE — 93010 ELECTROCARDIOGRAM REPORT: CPT

## 2017-04-12 RX ORDER — DILTIAZEM HCL 120 MG
120 CAPSULE, EXT RELEASE 24 HR ORAL DAILY
Qty: 0 | Refills: 0 | Status: DISCONTINUED | OUTPATIENT
Start: 2017-04-12 | End: 2017-04-15

## 2017-04-12 RX ORDER — IOHEXOL 300 MG/ML
50 INJECTION, SOLUTION INTRAVENOUS ONCE
Qty: 0 | Refills: 0 | Status: COMPLETED | OUTPATIENT
Start: 2017-04-12 | End: 2017-04-12

## 2017-04-12 RX ORDER — LISINOPRIL 2.5 MG/1
20 TABLET ORAL DAILY
Qty: 0 | Refills: 0 | Status: DISCONTINUED | OUTPATIENT
Start: 2017-04-12 | End: 2017-04-15

## 2017-04-12 RX ORDER — SIMVASTATIN 20 MG/1
40 TABLET, FILM COATED ORAL AT BEDTIME
Qty: 0 | Refills: 0 | Status: DISCONTINUED | OUTPATIENT
Start: 2017-04-12 | End: 2017-04-15

## 2017-04-12 RX ORDER — HYDROMORPHONE HYDROCHLORIDE 2 MG/ML
1 INJECTION INTRAMUSCULAR; INTRAVENOUS; SUBCUTANEOUS ONCE
Qty: 0 | Refills: 0 | Status: DISCONTINUED | OUTPATIENT
Start: 2017-04-12 | End: 2017-04-12

## 2017-04-12 RX ORDER — HYDROMORPHONE HYDROCHLORIDE 2 MG/ML
2 INJECTION INTRAMUSCULAR; INTRAVENOUS; SUBCUTANEOUS EVERY 4 HOURS
Qty: 0 | Refills: 0 | Status: DISCONTINUED | OUTPATIENT
Start: 2017-04-12 | End: 2017-04-15

## 2017-04-12 RX ORDER — PIPERACILLIN AND TAZOBACTAM 4; .5 G/20ML; G/20ML
3.38 INJECTION, POWDER, LYOPHILIZED, FOR SOLUTION INTRAVENOUS EVERY 6 HOURS
Qty: 0 | Refills: 0 | Status: DISCONTINUED | OUTPATIENT
Start: 2017-04-13 | End: 2017-04-13

## 2017-04-12 RX ORDER — NICOTINE POLACRILEX 2 MG
2 GUM BUCCAL EVERY 4 HOURS
Qty: 0 | Refills: 0 | Status: DISCONTINUED | OUTPATIENT
Start: 2017-04-12 | End: 2017-04-15

## 2017-04-12 RX ORDER — SODIUM CHLORIDE 9 MG/ML
3 INJECTION INTRAMUSCULAR; INTRAVENOUS; SUBCUTANEOUS ONCE
Qty: 0 | Refills: 0 | Status: COMPLETED | OUTPATIENT
Start: 2017-04-12 | End: 2017-04-12

## 2017-04-12 RX ORDER — PIPERACILLIN AND TAZOBACTAM 4; .5 G/20ML; G/20ML
3.38 INJECTION, POWDER, LYOPHILIZED, FOR SOLUTION INTRAVENOUS ONCE
Qty: 0 | Refills: 0 | Status: COMPLETED | OUTPATIENT
Start: 2017-04-12 | End: 2017-04-12

## 2017-04-12 RX ORDER — HYDROMORPHONE HYDROCHLORIDE 2 MG/ML
0.5 INJECTION INTRAMUSCULAR; INTRAVENOUS; SUBCUTANEOUS EVERY 4 HOURS
Qty: 0 | Refills: 0 | Status: DISCONTINUED | OUTPATIENT
Start: 2017-04-12 | End: 2017-04-12

## 2017-04-12 RX ORDER — HEPARIN SODIUM 5000 [USP'U]/ML
1700 INJECTION INTRAVENOUS; SUBCUTANEOUS
Qty: 25000 | Refills: 0 | Status: DISCONTINUED | OUTPATIENT
Start: 2017-04-12 | End: 2017-04-13

## 2017-04-12 RX ORDER — SODIUM CHLORIDE 9 MG/ML
1000 INJECTION INTRAMUSCULAR; INTRAVENOUS; SUBCUTANEOUS ONCE
Qty: 0 | Refills: 0 | Status: COMPLETED | OUTPATIENT
Start: 2017-04-12 | End: 2017-04-12

## 2017-04-12 RX ADMIN — HYDROMORPHONE HYDROCHLORIDE 2 MILLIGRAM(S): 2 INJECTION INTRAMUSCULAR; INTRAVENOUS; SUBCUTANEOUS at 23:18

## 2017-04-12 RX ADMIN — PIPERACILLIN AND TAZOBACTAM 200 GRAM(S): 4; .5 INJECTION, POWDER, LYOPHILIZED, FOR SOLUTION INTRAVENOUS at 18:31

## 2017-04-12 RX ADMIN — HYDROMORPHONE HYDROCHLORIDE 1 MILLIGRAM(S): 2 INJECTION INTRAMUSCULAR; INTRAVENOUS; SUBCUTANEOUS at 19:07

## 2017-04-12 RX ADMIN — SODIUM CHLORIDE 3 MILLILITER(S): 9 INJECTION INTRAMUSCULAR; INTRAVENOUS; SUBCUTANEOUS at 13:40

## 2017-04-12 RX ADMIN — SODIUM CHLORIDE 1000 MILLILITER(S): 9 INJECTION INTRAMUSCULAR; INTRAVENOUS; SUBCUTANEOUS at 13:40

## 2017-04-12 RX ADMIN — HEPARIN SODIUM 17 UNIT(S)/HR: 5000 INJECTION INTRAVENOUS; SUBCUTANEOUS at 23:18

## 2017-04-12 RX ADMIN — Medication 120 MILLIGRAM(S): at 23:17

## 2017-04-12 RX ADMIN — IOHEXOL 50 MILLILITER(S): 300 INJECTION, SOLUTION INTRAVENOUS at 15:14

## 2017-04-12 RX ADMIN — LISINOPRIL 20 MILLIGRAM(S): 2.5 TABLET ORAL at 22:22

## 2017-04-12 RX ADMIN — SIMVASTATIN 40 MILLIGRAM(S): 20 TABLET, FILM COATED ORAL at 22:22

## 2017-04-12 RX ADMIN — HYDROMORPHONE HYDROCHLORIDE 1 MILLIGRAM(S): 2 INJECTION INTRAMUSCULAR; INTRAVENOUS; SUBCUTANEOUS at 19:33

## 2017-04-12 NOTE — H&P ADULT - HISTORY OF PRESENT ILLNESS
52 y/o M with PMHx CAD, HTN, prior EtOH abuse, pancreatitis presents with CC of fever.  Notably, he recently was diagnosed with subcapsular splenic hematoma in January, which was managed nonoperatively and he was sent home. At that time, he denied history of trauma and had a negative mononucleosis screen. He returned one week later with fevers and worsening pain and was diagnosed with PE, and also had drainage of the perisplenic fluid collection (NICOLE drain placed on 2/1, removed  2/2). He was discharged on warfarin for management of the PE. He remained well from then on but last week returned to the ER with onset of LUQ abdominal pain. He was admitted for one day and then discharged with no diagnosis listed in the surgery H&P or discharge note. CT abdomen at that time showed extension of the perisplenic fluid collection. Since that discharge, he has continued to have periodic fevers for the past 6 days, measured at home to be as high as 101 F (last night), with associated 7/10 throbbing abdominal pain. The pain is worse with deep breaths, radiates to epigastrium. Alleviated by Dilaudid in ER. He also notes that for the last week he has had diarrhea (approximately 6 episodes per day at worst). Denies headache, sore throat, cough, chest pain, dysuria, melena, hematochezia, rash, calf tenderness.     Vitals upon presentation:  Tmax: 99.2, HR: 108, BP: 145/101, RR: 16, Sat: 97 (RA)    He received:  Zosyn 3.375mg , Dilaudid 1g IV, NS 1L

## 2017-04-12 NOTE — H&P ADULT - NSHPLABSRESULTS_GEN_ALL_CORE
LABS:                        11.5   11.1  )-----------( 350      ( 2017 13:24 )             33.3         138  |  101  |  13  ----------------------------<  105<H>  3.7   |  28  |  0.72    Ca    9.1      2017 13:24    TPro  7.5  /  Alb  2.9<L>  /  TBili  0.3  /  DBili  x   /  AST  21  /  ALT  33  /  AlkPhos  134<H>      PT/INR - ( 2017 13:24 )   PT: 16.3 sec;   INR: 1.46          PTT - ( 2017 13:24 )  PTT:32.8 sec  Urinalysis Basic - ( 2017 13:42 )    Color: Yellow / Appearance: Clear / S.025 / pH: x  Gluc: x / Ketone: NEGATIVE  / Bili: Small / Urobili: 0.2 E.U./dL   Blood: x / Protein: NEGATIVE mg/dL / Nitrite: NEGATIVE   Leuk Esterase: Trace / RBC: < 5 /HPF / WBC < 5 /HPF   Sq Epi: x / Non Sq Epi: Rare /HPF / Bacteria: Present /HPF        RADIOLOGY & ADDITIONAL TESTS:    EXAM:  CT ABDOMEN AND PELVIS OC IC                        PROCEDURE DATE:  2017    IMPRESSION:  1.  interval increase in size of a subcapsular and perisplenic splenic   fluid collection at the superior aspect of the spleen that now measures   approximately 7.2 x 9.7 x 7.2 cm.  This may represent an enlarging   hematoma versus abscess.  2.  Evidence of Crohn's disease.

## 2017-04-12 NOTE — H&P ADULT - NSHPPHYSICALEXAM_GEN_ALL_CORE
General: NAD  HEENT: NC/AT, PERRLA, sclera non icteric  Neck: thyroid WNL, no LAD  CV: S1/S2, RRR, no murmur  Resp: CTA b/l  Abd: soft, tender LUQ. Guarding present, no rebound tenderness. +BS. No masses.  Ext: trace pedal edema  Neuro: A&O x 3, full strength all extremities, decreased sensation left foot

## 2017-04-12 NOTE — H&P ADULT - PROBLEM SELECTOR PLAN 3
INR subtherapeutic on coumadin. Will start heparin drip. Will check d-dimer and based on result, consider CT PE protocol tomorrow

## 2017-04-12 NOTE — H&P ADULT - PROBLEM SELECTOR PLAN 1
Low grade, although patient reports Tmax 101 at home. Differential includes infected perisplenic fluid collection vs. diarrhea/gastroenteritis vs. pulmonary embolism. Infected perisplenic fluid collection most likely etiology, however had also been having diarrhea for past week. CT abd with incidental finding of skip lesions, which can be seen with Crohn's disease. PE also a possibility given that he initially presented tachycardic, his pain is worse with deep breaths, and his INR has been subtherapeutic.

## 2017-04-12 NOTE — ED STATDOCS - OBJECTIVE STATEMENT
51 m co upper abd pain- upper abd pain for 1 week with persistent fevers  no n/v tolerating po- sent in for CT  pain is mild moderate- not taking any meds  no radiation  no exac/allev factors

## 2017-04-12 NOTE — ED ADULT NURSE NOTE - CHPI ED SYMPTOMS NEG
no vomiting/no burning urination/no diarrhea/no dysuria/no blood in stool/no hematuria/no nausea/no abdominal distension

## 2017-04-12 NOTE — ED ADULT TRIAGE NOTE - CHIEF COMPLAINT QUOTE
left side abdomin x1 week and intermittent fevers x5 days. Patient reports "I was admitted last week for pancreatitis." Patient denies taking anything for fever.

## 2017-04-12 NOTE — H&P ADULT - ASSESSMENT
50 y/o M with recent subcapsular splenic hematoma s/p drainage, as well as recent pulmonary embolism (left lower lobe) presents with fevers and LUQ abdominal pain for one week. Also with periodic diarrhea for one week. Increase in subcapsular and perisplenic fluid collection visualized on CT. Did not meet SIRS criteria on presentation. Admitted for fevers in setting of perisplenic fluid collection.

## 2017-04-12 NOTE — ED PROVIDER NOTE - OBJECTIVE STATEMENT
51 m w abd pain- co upper abd pain- sharp no radiation, tolerating po but slightly decr appetite, pain slightly worse with eating no n/v  + F/C intermittent x 1 week- 1 week ago had large meal and beer developed sharp upper abd pain- CT showed pancreatitis and chronic splenic hematoma- was d/c'ed the next day- has had f/c since  denies ETOH, not taking any pain meds  no exac/allev factors  moderate pain

## 2017-04-12 NOTE — H&P ADULT - PROBLEM SELECTOR PLAN 2
Unknown etiology. No hx of trauma. Mononucleosis screen negative. May be associated with pancreatitis, which he does have a history of. Will keep on Zosyn 3.375mg q6h for possibility of infected perisplenic collection. If no other source of fevers and worsened pain found, will require IR drainage

## 2017-04-12 NOTE — H&P ADULT - ATTENDING COMMENTS
51M hx CAD, HTN, prior EtOH abuse, pancreatitis, known splenic hematoma (had NICOLE drain during admission in February), PE (dx 2/2017, on Coumadin) who was admitted to surgery service and discharged 1d later for "abdominal pain" without definitive diagnosis who presents with c/o fevers at home since his discharge. He also c/o worsening LUQ abdominal pain, radiating to epigastrum and to L subscapular area. He also c/o diarrhea that had resolved since that admission, but recurred x1 episode today. +chronic cough, unchanged. No CP, SOB, urinary sxs. CT A/P showed interval enlargement of splenic hematoma vs abscess, as well as findings c/w possible Crohns disease. Pt never had c-scope, no fam hx of Crohns.  VS as above  NAD, AAOx3  RRR, no murmurs  Lungs CTA  Abd with +TTP in LUQ with voluntary guarding  LEs WNL (large scar on LLE 2/2 sting ray sting)  Labs and imaging reviewed  A/P: 51M with splenic hematoma who presents with worsening LUQ pain, fevers, diarrhea. Surgery consulted in ED and recommended IR drainage of splenic hematoma/abscess if no other source of fever evident. At this point, there are a few potential sources of fever that may need further work-up prior to drainage. Chief among them is diarrhea, which may be 2/2 undiagnosed Crohns (given CT findings), however, pt's history is not very consistent with Crohns, as diarrhea nonbloody and abd pain limited to LUQ (site of hematoma/abscess), although he does c/o abdominal bloating. Would obtain GI consult for c-scope for further eval. Will also send stool studies to eval for infection. Fever could be caused by Crohns. Pt also with incidentally noted small L pleural effusion. While he does not have any symptoms of pna (chronic cough is unchanged), he may have underlying pneumonia causing parapneumonic effusion. Additionally, extension of his PE or recurrence could cause fevers, although less likely. He was subtherapeutic on Coumadin, so will start Heparin gtt at this time. Pt may need additional imaging (CT PE protocol) to further eval for another PE or underlying pna, and to further eval pleural effusion, although, will hold off tonight as pt already received contrast load for CT A/P. At this time, will continue with Zosyn for possible infected splenic hematoma and monitor for improvement. Will need to f/u further surgical recs, and anticipate pt will need evacuation of splenic hematoma/abscess by IR drainage, especially if further work-up unrevealing.

## 2017-04-12 NOTE — CONSULT NOTE ADULT - ASSESSMENT
51M pt with above history now with enlargement of anterior splenic fluid collection, patient on coumadin for PE, h/h stable    Plan:  No surgical interventions planned  No absolute need to drain perisplenic collection from general surgery perspective, however if medical team cannot find another source of fever then could consider IR drainage  If admitted, will follow  Discussed with Dr. Hu

## 2017-04-12 NOTE — H&P ADULT - PROBLEM SELECTOR PLAN 4
Will check fecal Aden stain, stool culture, stool O&P. Will also check C. diff, although very low suspicion, given IV antibiotics use during hospitalizations during past two months (although was not discharged on antibiotics). Denies history that would be suspicious for Crohn's disease, but will check ESR given CT findings. May be elevated in any case from any other infection.

## 2017-04-12 NOTE — ED PROVIDER NOTE - CARE PLAN
Principal Discharge DX:	Fever, unspecified fever cause  Secondary Diagnosis:	Hematoma of spleen, closed, subsequent encounter

## 2017-04-12 NOTE — ED ADULT NURSE NOTE - OBJECTIVE STATEMENT
pt. with hx of pancreatitis presented with c/o LUQ pain and fever for 5 days. Pt. is A&Ox3, communicates w/o difficulty, denies chest pain, shortness of breath, n/v, abdomen tender to palpation LUQ, blood was sent.

## 2017-04-13 DIAGNOSIS — I26.99 OTHER PULMONARY EMBOLISM WITHOUT ACUTE COR PULMONALE: ICD-10-CM

## 2017-04-13 LAB
ALBUMIN SERPL ELPH-MCNC: 2.7 G/DL — LOW (ref 3.4–5)
ALP SERPL-CCNC: 127 U/L — HIGH (ref 40–120)
ALT FLD-CCNC: 32 U/L — SIGNIFICANT CHANGE UP (ref 12–42)
ANION GAP SERPL CALC-SCNC: 8 MMOL/L — LOW (ref 9–16)
APTT BLD: 56.3 SEC — HIGH (ref 27.5–37.4)
APTT BLD: 65 SEC — HIGH (ref 27.5–37.4)
APTT BLD: 72.4 SEC — HIGH (ref 27.5–37.4)
APTT BLD: 89.2 SEC — HIGH (ref 27.5–37.4)
AST SERPL-CCNC: 21 U/L — SIGNIFICANT CHANGE UP (ref 15–37)
BILIRUB SERPL-MCNC: 0.6 MG/DL — SIGNIFICANT CHANGE UP (ref 0.2–1.2)
BLD GP AB SCN SERPL QL: NEGATIVE — SIGNIFICANT CHANGE UP
BUN SERPL-MCNC: 12 MG/DL — SIGNIFICANT CHANGE UP (ref 7–23)
CALCIUM SERPL-MCNC: 8.8 MG/DL — SIGNIFICANT CHANGE UP (ref 8.5–10.5)
CHLORIDE SERPL-SCNC: 102 MMOL/L — SIGNIFICANT CHANGE UP (ref 96–108)
CO2 SERPL-SCNC: 27 MMOL/L — SIGNIFICANT CHANGE UP (ref 22–31)
CREAT SERPL-MCNC: 0.66 MG/DL — SIGNIFICANT CHANGE UP (ref 0.5–1.3)
CULTURE RESULTS: NO GROWTH — SIGNIFICANT CHANGE UP
ERYTHROCYTE [SEDIMENTATION RATE] IN BLOOD: 98 MM/HR — HIGH
GLUCOSE SERPL-MCNC: 119 MG/DL — HIGH (ref 70–99)
HBA1C BLD-MCNC: 6.9 % — HIGH (ref 4.8–5.6)
HCT VFR BLD CALC: 32 % — LOW (ref 39–50)
HCT VFR BLD CALC: 35.4 % — LOW (ref 39–50)
HCT VFR BLD CALC: 36.2 % — LOW (ref 39–50)
HGB BLD-MCNC: 10.9 G/DL — LOW (ref 13–17)
HGB BLD-MCNC: 11.9 G/DL — LOW (ref 13–17)
HGB BLD-MCNC: 12.1 G/DL — LOW (ref 13–17)
MAGNESIUM SERPL-MCNC: 2 MG/DL — SIGNIFICANT CHANGE UP (ref 1.6–2.4)
MCHC RBC-ENTMCNC: 30 PG — SIGNIFICANT CHANGE UP (ref 27–34)
MCHC RBC-ENTMCNC: 30.1 PG — SIGNIFICANT CHANGE UP (ref 27–34)
MCHC RBC-ENTMCNC: 30.5 PG — SIGNIFICANT CHANGE UP (ref 27–34)
MCHC RBC-ENTMCNC: 33.4 G/DL — SIGNIFICANT CHANGE UP (ref 32–36)
MCHC RBC-ENTMCNC: 33.6 G/DL — SIGNIFICANT CHANGE UP (ref 32–36)
MCHC RBC-ENTMCNC: 34.1 G/DL — SIGNIFICANT CHANGE UP (ref 32–36)
MCV RBC AUTO: 89.4 FL — SIGNIFICANT CHANGE UP (ref 80–100)
MCV RBC AUTO: 89.6 FL — SIGNIFICANT CHANGE UP (ref 80–100)
MCV RBC AUTO: 89.6 FL — SIGNIFICANT CHANGE UP (ref 80–100)
PLATELET # BLD AUTO: 340 K/UL — SIGNIFICANT CHANGE UP (ref 150–400)
PLATELET # BLD AUTO: 357 K/UL — SIGNIFICANT CHANGE UP (ref 150–400)
PLATELET # BLD AUTO: 362 K/UL — SIGNIFICANT CHANGE UP (ref 150–400)
POTASSIUM SERPL-MCNC: 3.3 MMOL/L — LOW (ref 3.5–5.3)
POTASSIUM SERPL-SCNC: 3.3 MMOL/L — LOW (ref 3.5–5.3)
PROT SERPL-MCNC: 7.1 G/DL — SIGNIFICANT CHANGE UP (ref 6.4–8.2)
RBC # BLD: 3.57 M/UL — LOW (ref 4.2–5.8)
RBC # BLD: 3.96 M/UL — LOW (ref 4.2–5.8)
RBC # BLD: 4.04 M/UL — LOW (ref 4.2–5.8)
RBC # FLD: 13 % — SIGNIFICANT CHANGE UP (ref 10.3–16.9)
RBC # FLD: 13.1 % — SIGNIFICANT CHANGE UP (ref 10.3–16.9)
RBC # FLD: 13.4 % — SIGNIFICANT CHANGE UP (ref 10.3–16.9)
RH IG SCN BLD-IMP: POSITIVE — SIGNIFICANT CHANGE UP
SODIUM SERPL-SCNC: 137 MMOL/L — SIGNIFICANT CHANGE UP (ref 135–145)
SPECIMEN SOURCE: SIGNIFICANT CHANGE UP
VIT B12 SERPL-MCNC: 609 PG/ML — SIGNIFICANT CHANGE UP (ref 243–894)
WBC # BLD: 10.9 K/UL — HIGH (ref 3.8–10.5)
WBC # BLD: 11.6 K/UL — HIGH (ref 3.8–10.5)
WBC # BLD: 11.9 K/UL — HIGH (ref 3.8–10.5)
WBC # FLD AUTO: 10.9 K/UL — HIGH (ref 3.8–10.5)
WBC # FLD AUTO: 11.6 K/UL — HIGH (ref 3.8–10.5)
WBC # FLD AUTO: 11.9 K/UL — HIGH (ref 3.8–10.5)

## 2017-04-13 PROCEDURE — 99232 SBSQ HOSP IP/OBS MODERATE 35: CPT | Mod: GC

## 2017-04-13 PROCEDURE — 99222 1ST HOSP IP/OBS MODERATE 55: CPT

## 2017-04-13 RX ORDER — POTASSIUM CHLORIDE 20 MEQ
40 PACKET (EA) ORAL EVERY 4 HOURS
Qty: 0 | Refills: 0 | Status: COMPLETED | OUTPATIENT
Start: 2017-04-13 | End: 2017-04-13

## 2017-04-13 RX ORDER — HEPARIN SODIUM 5000 [USP'U]/ML
1700 INJECTION INTRAVENOUS; SUBCUTANEOUS
Qty: 25000 | Refills: 0 | Status: DISCONTINUED | OUTPATIENT
Start: 2017-04-13 | End: 2017-04-14

## 2017-04-13 RX ORDER — LANOLIN ALCOHOL/MO/W.PET/CERES
3 CREAM (GRAM) TOPICAL AT BEDTIME
Qty: 0 | Refills: 0 | Status: DISCONTINUED | OUTPATIENT
Start: 2017-04-13 | End: 2017-04-15

## 2017-04-13 RX ORDER — HEPARIN SODIUM 5000 [USP'U]/ML
1800 INJECTION INTRAVENOUS; SUBCUTANEOUS
Qty: 25000 | Refills: 0 | Status: DISCONTINUED | OUTPATIENT
Start: 2017-04-13 | End: 2017-04-13

## 2017-04-13 RX ADMIN — HYDROMORPHONE HYDROCHLORIDE 2 MILLIGRAM(S): 2 INJECTION INTRAMUSCULAR; INTRAVENOUS; SUBCUTANEOUS at 04:51

## 2017-04-13 RX ADMIN — Medication 40 MILLIEQUIVALENT(S): at 06:52

## 2017-04-13 RX ADMIN — HYDROMORPHONE HYDROCHLORIDE 2 MILLIGRAM(S): 2 INJECTION INTRAMUSCULAR; INTRAVENOUS; SUBCUTANEOUS at 15:31

## 2017-04-13 RX ADMIN — HEPARIN SODIUM 18 UNIT(S)/HR: 5000 INJECTION INTRAVENOUS; SUBCUTANEOUS at 06:53

## 2017-04-13 RX ADMIN — Medication 3 MILLIGRAM(S): at 22:15

## 2017-04-13 RX ADMIN — HEPARIN SODIUM 17 UNIT(S)/HR: 5000 INJECTION INTRAVENOUS; SUBCUTANEOUS at 13:03

## 2017-04-13 RX ADMIN — HYDROMORPHONE HYDROCHLORIDE 2 MILLIGRAM(S): 2 INJECTION INTRAMUSCULAR; INTRAVENOUS; SUBCUTANEOUS at 03:37

## 2017-04-13 RX ADMIN — PIPERACILLIN AND TAZOBACTAM 200 GRAM(S): 4; .5 INJECTION, POWDER, LYOPHILIZED, FOR SOLUTION INTRAVENOUS at 13:02

## 2017-04-13 RX ADMIN — SIMVASTATIN 40 MILLIGRAM(S): 20 TABLET, FILM COATED ORAL at 22:15

## 2017-04-13 RX ADMIN — Medication 40 MILLIEQUIVALENT(S): at 03:56

## 2017-04-13 RX ADMIN — HYDROMORPHONE HYDROCHLORIDE 2 MILLIGRAM(S): 2 INJECTION INTRAMUSCULAR; INTRAVENOUS; SUBCUTANEOUS at 09:05

## 2017-04-13 RX ADMIN — Medication 120 MILLIGRAM(S): at 06:52

## 2017-04-13 RX ADMIN — HYDROMORPHONE HYDROCHLORIDE 2 MILLIGRAM(S): 2 INJECTION INTRAMUSCULAR; INTRAVENOUS; SUBCUTANEOUS at 17:31

## 2017-04-13 RX ADMIN — PIPERACILLIN AND TAZOBACTAM 200 GRAM(S): 4; .5 INJECTION, POWDER, LYOPHILIZED, FOR SOLUTION INTRAVENOUS at 06:53

## 2017-04-13 RX ADMIN — LISINOPRIL 20 MILLIGRAM(S): 2.5 TABLET ORAL at 06:52

## 2017-04-13 RX ADMIN — HYDROMORPHONE HYDROCHLORIDE 2 MILLIGRAM(S): 2 INJECTION INTRAMUSCULAR; INTRAVENOUS; SUBCUTANEOUS at 01:00

## 2017-04-13 RX ADMIN — HYDROMORPHONE HYDROCHLORIDE 2 MILLIGRAM(S): 2 INJECTION INTRAMUSCULAR; INTRAVENOUS; SUBCUTANEOUS at 10:30

## 2017-04-13 RX ADMIN — PIPERACILLIN AND TAZOBACTAM 200 GRAM(S): 4; .5 INJECTION, POWDER, LYOPHILIZED, FOR SOLUTION INTRAVENOUS at 01:00

## 2017-04-13 NOTE — PROGRESS NOTE ADULT - PROBLEM SELECTOR PLAN 1
Low grade, although patient reports Tmax 101 at home. Differential includes infected perisplenic fluid collection vs. diarrhea/gastroenteritis vs. pulmonary embolism. Infected perisplenic fluid collection most likely etiology, however had also been having diarrhea for past week. However, he reports recent diagnosis of pancreatitis and says that his diarrhea has since improved. CT abdomen with incidental finding of skip lesions, which can be seen with Crohn disease. PE also a possibility given that he initially presented tachycardic, his pain is worse with deep breaths, and his INR has been subtherapeutic.  - IR consulted; believe collection in abdomen to be hematoma over abscess. However, to be drained today.  - afebrile for past 12 hours; continue to monitor Low grade, although patient reports Tmax 101 at home. Differential includes infected perisplenic fluid collection vs. diarrhea/gastroenteritis vs. pulmonary embolism. Infected perisplenic fluid collection most likely etiology, however had also been having diarrhea for past week. However, he reports recent diagnosis of pancreatitis and says that his diarrhea has since improved. CT abdomen with incidental finding of skip lesions, which can be seen with Crohn disease. PE also a possibility given that he initially presented tachycardic, his pain is worse with deep breaths, and his INR has been subtherapeutic.  - IR consulted; believe collection in abdomen to be hematoma over abscess. Stated it is a technically difficult procedure with a high risk of pneumothorax. Accordingly, defer IR today and observe. Should clinical condition worsen, consider IR procedure.  - afebrile for past 12 hours; continue to monitor

## 2017-04-13 NOTE — CONSULT NOTE ADULT - SUBJECTIVE AND OBJECTIVE BOX
Hematology Oncology Consult Note (Dr. Obregon)  Discussed with Dr. Obregon and recommendations reviewed with the primary team.    The patient was seen and examined    JENISE ANTHONY is a 51y Male with history of PE diagnosed on  (small pulmonary emboli within the   segmental branches to the left lower lobe) and treated with coumadin who was also noted to have splenic hemorrhage followed by Dr. Brown after recent admission for pain related to hemorrhage, s/p drainage.  Pt now returns for LUQ pain and found to have new splenic hemorrhage (team reviewed with radiology who does not feel is related to abscess).  Otherwise no other symptoms including chest pain/sob/pleuritic pain, palpitations.  No n/v.  He has been having low grade fevers of 98-99 with possibly once a temp of 100.  He was seen by coumadin clinic and surgeon on 17 and noted to have subtherapeutic INR (don't know number - for outpt labs) and instructed to take coumadin 10 mg daily x 3 days then resume daily 5 mg tablets.  Surgeon saw pt and pt had noted the low grade fevers and was recc to go to ER if fevers persisted for concern of splenic abscess.  Otherwise, pt also notes admission last week for pancreatitis s/p binge drinking beer.  Otherwise, now, no melena/hematochezia/hematuria/epistaxis/gingival bleeding.      ROS otherwise negative.      PAST MEDICAL & SURGICAL HISTORY:  Polysubstance abuse  Pancreatitis  Essential hypertension: HTN (hypertension)  Nondependent alcohol abuse: Alcohol abuse  Atherosclerosis of coronary artery: CAD (coronary artery disease)  Angina pectoris: Anginal pain  Status post percutaneous transluminal coronary angioplasty: S/P angioplasty      Allergies:  NKDA      Medications:  heparin  Infusion 1700Unit(s)/Hr IV Continuous <Continuous>        Social History: current smoker, now stopped drinking etoh since last admission last week, no ivdu; retired [employer]    FAMILY HISTORY:  Family history of diabetes mellitus (Sibling)  Family history of Hodgkin&#x27;s lymphoma (Mother)  Family history of mental disorder (Father): Family history of alcoholism      PHYSICAL EXAM:    T(F): 98.1, Max: 99.5 (04-12 @ 20:50)  HR: 75 (75 - 91)  BP: 132/86 (127/83 - 147/98)  RR: 18 (17 - 18)  SpO2: 98% (93% - 98%)    Gen: well developed, well nourished, comfortable appearing  HEENT: normocephalic/atraumatic, no conjunctival pallor, no scleral icterus  Neck: supple, no masses  Cardiovascular: RR, nl S1S2, no murmurs/rubs/gallops  Respiratory: clear air entry b/l  Gastrointestinal: BS+, soft, ND, no masses, no splenomegaly, but slight tenderness to palpation LUQ - no rebound/guarding, no hepatomegaly, no evidence for ascites  Extremities: no clubbing/cyanosis, no edema, no calf tenderness  Vascular:  DP/PT 2+ b/l  Neurological: CN 2-12 grossly intact, no focal deficits  Skin: no rash on visible skin  Lymph Nodes:  no cervical/supraclavicular LAD  Musculoskeletal:  full ROM  Psychiatric:  mood stable    Labs:                          11.9   11.9  )-----------( 357      ( 2017 11:21 )             35.4     CBC Full  -  ( 2017 11:21 )  WBC Count : 11.9 K/uL  Hemoglobin : 11.9 g/dL  Hematocrit : 35.4 %  Platelet Count - Automated : 357 K/uL  Mean Cell Volume : 89.4 fL  Mean Cell Hemoglobin : 30.1 pg  Mean Cell Hemoglobin Concentration : 33.6 g/dL  Auto Neutrophil # : x  Auto Lymphocyte # : x  Auto Monocyte # : x  Auto Eosinophil # : x  Auto Basophil # : x  Auto Neutrophil % : x  Auto Lymphocyte % : x  Auto Monocyte % : x  Auto Eosinophil % : x  Auto Basophil % : x    PT/INR - ( 2017 13:24 )   PT: 16.3 sec;   INR: 1.46          PTT - ( 2017 11:21 )  PTT:89.2 sec        137  |  102  |  12  ----------------------------<  119<H>   3.3<L>   |  27  |  0.66    Ca    8.8      2017 02:31  Mg     2.0         TPro  7.1  /  Alb  2.7<L>  /  TBili  0.6  /  DBili  x   /  AST  21  /  ALT  32  /  AlkPhos  127<H>        Urinalysis Basic - ( 2017 13:42 )    Color: Yellow / Appearance: Clear / S.025 / pH: x  Gluc: x / Ketone: NEGATIVE  / Bili: Small / Urobili: 0.2 E.U./dL   Blood: x / Protein: NEGATIVE mg/dL / Nitrite: NEGATIVE   Leuk Esterase: Trace / RBC: < 5 /HPF / WBC < 5 /HPF   Sq Epi: x / Non Sq Epi: Rare /HPF / Bacteria: Present /HPF        Other Labs:    Cultures:    Pathology:    Imaging Studies:    CT ABDOMEN AND PELVIS OC IC                          PROCEDURE DATE:  2017      IMPRESSION:  1.  interval increase in size of a subcapsular and perisplenic splenic   fluid collection at the superior aspect of the spleen that now measures   approximately 7.2 x 9.7 x 7.2 cm.  This may represent an enlarging   hematoma versus abscess.  2.  Evidence of Crohn's disease.
HPI: 51M w/PMH of CAD, MI s/p angioplasty 10 years on ASA 81, HTN, and chronic pancreatitis 2/2 to EtOH abuse(drinks 1 beer per day), atraumatic splenic hematoma treated non operatively(NICOLE drain placed on 17 removed 17), segmental PE on coumadin, with recent admission for observation after LUQ after a meal and a beer presents to [facility] ED today with fever. The patient reports that discharge last week he has had a couple epsidodes of fever to 101 at home. He saw Dr. Hu in the office who said that his pain did not necessitate surgery, but that if his fevers continued he should see his PCP. Overnight he had another fever so he came in. He otherwise has improvement in his LUQ pain, mild nausea but improved. He has been taking his medications as prescribed. In the ED the patient is afebrile, WBC stable at 11.1, CT scan shows enlargement in perisplenic collection, but no findings necessitating surgery. Some thickening of various portions of colon and distal ileum, patient reports he has never had a colonoscopy.        PAST MEDICAL & SURGICAL HISTORY:  Polysubstance abuse  Pancreatitis  Essential hypertension: HTN (hypertension)  Nondependent alcohol abuse: Alcohol abuse  Atherosclerosis of coronary artery: CAD (coronary artery disease)  Angina pectoris: Anginal pain  Status post percutaneous transluminal coronary angioplasty: S/P angioplasty          MEDICATIONS  (STANDING):  piperacillin/tazobactam IVPB. 3.375Gram(s) IV Intermittent once    MEDICATIONS  (PRN):      Allergies    No Known Allergies    Intolerances        SOCIAL HISTORY:        Vital Signs Last 24 Hrs  T(C): 37, Max: 37.3 (04-12 @ 12:13)  T(F): 98.6, Max: 99.2 (04-12 @ 12:13)  HR: 87 (87 - 108)  BP: 147/98 (144/91 - 147/98)  BP(mean): --  RR: 18 (16 - 18)  SpO2: 97% (96% - 97%)    Physical Exam:  Gen: In bed, A/Ox3, NAD  Card: RRR, No m/r/g  Lungs: CTAB/L, no wheezes or crackles  Abdomen: Soft, minimal tenderness in LUQ, mild distention vs body habitus  Extremities: No edema    LABS:                        11.5   11.1  )-----------( 350      ( 2017 13:24 )             33.3         138  |  101  |  13  ----------------------------<  105<H>  3.7   |  28  |  0.72    Ca    9.1      2017 13:24    TPro  7.5  /  Alb  2.9<L>  /  TBili  0.3  /  DBili  x   /  AST  21  /  ALT  33  /  AlkPhos  134<H>  12    PT/INR - ( 2017 13:24 )   PT: 16.3 sec;   INR: 1.46          PTT - ( 2017 13:24 )  PTT:32.8 sec  Urinalysis Basic - ( 2017 13:42 )    Color: Yellow / Appearance: Clear / S.025 / pH: x  Gluc: x / Ketone: NEGATIVE  / Bili: Small / Urobili: 0.2 E.U./dL   Blood: x / Protein: NEGATIVE mg/dL / Nitrite: NEGATIVE   Leuk Esterase: Trace / RBC: < 5 /HPF / WBC < 5 /HPF   Sq Epi: x / Non Sq Epi: Rare /HPF / Bacteria: Present /HPF        RADIOLOGY & ADDITIONAL STUDIES:  CT abdomen:  IMPRESSION:  1.  interval increase in size of a subcapsular and perisplenic splenic   fluid collection at the superior aspect of the spleen that now measures   approximately 7.2 x 9.7 x 7.2 cm.  This may represent an enlarging   hematoma versus abscess.  2.  Evidence of Crohn's disease.

## 2017-04-13 NOTE — PROGRESS NOTE ADULT - SUBJECTIVE AND OBJECTIVE BOX
SUBJECTIVE: Pt seen and examined at bedside. No overnight events. No f/c/n/v overnight. +BM/+flatus    Vital Signs Last 24 Hrs  T(C): 36.7, Max: 37.5 ( @ 20:50)  T(F): 98, Max: 99.5 ( @ 20:50)  HR: 89 (85 - 108)  BP: 132/84 (127/83 - 147/98)  BP(mean): --  RR: 18 (16 - 18)  SpO2: 95% (93% - 97%)    PHYSICAL EXAM    Gen: NAD  CV: RRR  Pulm: Regular non-labored respirations  Abd: Soft, minimally distended, minimally TTP in LUQ. No rebound or guarding  Ext: WWP        LABS:                        10.9   11.6  )-----------( 340      ( 2017 02:31 )             32.0     -    137  |  102  |  12  ----------------------------<  119<H>  3.3<L>   |  27  |  0.66    Ca    8.8      2017 02:31  Mg     2.0     -    TPro  7.1  /  Alb  2.7<L>  /  TBili  0.6  /  DBili  x   /  AST  21  /  ALT  32  /  AlkPhos  127<H>  -13    PT/INR - ( 2017 13:24 )   PT: 16.3 sec;   INR: 1.46          PTT - ( 2017 02:31 )  PTT:56.3 sec  Urinalysis Basic - ( 2017 13:42 )    Color: Yellow / Appearance: Clear / S.025 / pH: x  Gluc: x / Ketone: NEGATIVE  / Bili: Small / Urobili: 0.2 E.U./dL   Blood: x / Protein: NEGATIVE mg/dL / Nitrite: NEGATIVE   Leuk Esterase: Trace / RBC: < 5 /HPF / WBC < 5 /HPF   Sq Epi: x / Non Sq Epi: Rare /HPF / Bacteria: Present /HPF        ASSESSMENT AND PLAN  51M pt with atraumatic splenic hematoma s/p prior drainage 2017 now with continued fevers and enlargement of anterior splenic fluid collection  -No acute general surgical intervention at this time  -Would recommend IR drainage of perisplenic collection  -Further management/workup per primary team  -D/W Surgery Chief and with Dr. Hu

## 2017-04-13 NOTE — CONSULT NOTE ADULT - PROBLEM SELECTOR RECOMMENDATION 2
- recc consult with surgery regarding splenectomy in setting that he is very high risk for re-bleed  - in setting that pt may benefit from splenectomy would recc Strep and Neisseria vaccination - recc consult with surgery regarding splenectomy in setting that he is very high risk for re-bleed  - will likely benefit from further imaging - angiogram - of spleen to determine if there is a vascular source of bleed that can be addressed otherwise to avoid splenectomy  - in setting that pt may benefit from splenectomy would recc Strep and Neisseria vaccination  Discussed with Dr. Obregon

## 2017-04-13 NOTE — CONSULT NOTE ADULT - ASSESSMENT
52 yo male with hx of PE on coumadin and splenic hematoma s/p drainage now with new hematoma and concern for re-bleeding while on anticoagulation

## 2017-04-13 NOTE — PROGRESS NOTE ADULT - PROBLEM SELECTOR PLAN 7
DASH/TLC diet. NPO for IR drainage at present.  Replete electrolytes based on routine laboratory studies. DASH/TLC clear liquid diet.  Replete electrolytes based on routine laboratory studies.

## 2017-04-13 NOTE — CONSULT NOTE ADULT - PROBLEM SELECTOR RECOMMENDATION 9
-small PE diagnosed Jan 29; approaching 3 mos of anticoagulation tx with coumadin - this anticoagulant chosen as safest in setting of splenic hematoma as well with concern for rebleeding and this drug can be reversed if necessary  - pt requires hypercoagulable w/u; pls check ProtC/S levels, Factor V Leiden, PT gene mutation, lupus anticoagulant, [id number], and anticardiolipin abs  - would re-scan with CT chest PE protocol to re-assess PE which will also help with guiding anticoagulant regimen  - at this time, do agree with continuing heparin gtt with goal PTT 60-80 for management of PE - pls see below recc regarding splenic hematoma which will help decision regarding bridging back to coumadin; probably not best option to choose NOAC in high risk for re-bleed and not great option for reversibility

## 2017-04-13 NOTE — CONSULT NOTE ADULT - ATTENDING COMMENTS
would give pt pre splenectomy immunization with Pneumococcal and Meningococcal vaccine.    CT scan PE protocol tomorrow, with possible DC of AC

## 2017-04-13 NOTE — PROGRESS NOTE ADULT - PROBLEM SELECTOR PLAN 4
Two episodes of diarrhea upon presentation to the hospital. Low grade fever. Consideration for infectious cause of diarrhea low.  - if diarrhea continues, check fecal Aden stain, stool culture, stool O&P. If worsened, assess C. diff, although very low suspicion, given IV antibiotics use during hospitalizations during past two months (although was not discharged on antibiotics). - concern for Crohn disease given findings of skip lesions on CT abdomen. Will consider GI consult if symptoms worsen; otherwise, outpatient assessment recommended.

## 2017-04-13 NOTE — PROGRESS NOTE ADULT - PROBLEM SELECTOR PLAN 2
Unknown etiology. No history of trauma, mononucleosis screen negative. May be associated with pancreatitis (recent diagnosis).  - maintain Zosyn 3.375mg q6h for possibility of infected perisplenic collection  - NPO for IR drainage today Unknown etiology. No history of trauma, mononucleosis screen negative. May be associated with pancreatitis (recent diagnosis).  - maintain Zosyn 3.375mg q6h for possibility of infected perisplenic collection  - IR consulted; believe collection in abdomen to be hematoma over abscess. Stated it is a technically difficult procedure with a high risk of pneumothorax. Accordingly, defer IR today and observe. Should clinical condition worsen, consider IR procedure.

## 2017-04-13 NOTE — PROGRESS NOTE ADULT - PROBLEM SELECTOR PLAN 3
INR subtherapeutic on coumadin.  - Heparin gtt at present. Given risk of enhancement of hematoma, will evaluate with regular CBCs. Discussed with IR; they do not believe patient is actively bleeding as per scans. Exam and vitals do not indicate hemodynamic instability/hypovolemia. INR subtherapeutic on coumadin.  - Heparin gtt at present. Given risk of enhancement of hematoma, will evaluate with regular CBCs. Discussed with IR; they do not believe patient is actively bleeding as per scans. Exam and vitals do not indicate hemodynamic instability/hypovolemia.    Will discuss with Heme stopping a/c as we are approaching 3 months of a/c.

## 2017-04-13 NOTE — PROGRESS NOTE ADULT - ASSESSMENT
51M with recent subcapsular splenic hematoma (s/p NICOLE drain), as well as recent pulmonary embolism (segmental, LLL) who presents with fevers and LUQ abdominal pain for one week. CT demonstrating interval enhancement of subcapsular and perisplenic fluid collection visualized on CT.

## 2017-04-13 NOTE — PROGRESS NOTE ADULT - SUBJECTIVE AND OBJECTIVE BOX
INTERVAL HPI/OVERNIGHT EVENTS:  DORIE overnight. At present, the patient denies any shortness of breath, cough, fevers, chills, chest pain, N/V, and/or urinary symptoms. Had two episodes of diarrhea after presentation to the hospital; no such episodes overnight.    VITAL SIGNS:  T(F): 98  HR: 89  BP: 132/84  RR: 18  SpO2: 95%  Wt(kg): --    PHYSICAL EXAM:  GENERAL: Well-developed and well-nourished, in no apparent distress.  HEENT: Head is normocephalic and atraumatic, with extraocular muscle movements intact bilaterally. Pupils are equal, round, and reactive to light and accommodation. Oral mucosa moist and without lesions. Neck is supple, without appreciable lymphadenopathy or thyromegaly.  PULMONARY/THORAX: Clear to auscultation bilaterally.  CARDIOVASCULAR: Regular rate and rhythm without murmurs, rubs, or gallops.  ABDOMEN: Soft, minimally tender to palpation in epigastrium/LUQ.  Normoactive bowel sounds.  No palpable hepatosplenomegaly.  EXTREMITIES: Without cyanosis, clubbing, or edema.  NEUROLOGIC: A&Ox3; CNs II-XII are grossly intact.  DERMATOLOGIC: No cutaneous lesions noted on exposed skin.    MEDICATIONS  (STANDING):  simvastatin 40milliGRAM(s) Oral at bedtime  lisinopril 20milliGRAM(s) Oral daily  diltiazem   CD 120milliGRAM(s) Oral daily  piperacillin/tazobactam IVPB. 3.375Gram(s) IV Intermittent every 6 hours  heparin  Infusion 1800Unit(s)/Hr IV Continuous <Continuous>    MEDICATIONS  (PRN):  nicotine  Polacrilex Gum 2milliGRAM(s) Oral every 4 hours PRN cigarette craving  HYDROmorphone   Tablet 2milliGRAM(s) Oral every 4 hours PRN Severe Pain (7 - 10)      Allergies    No Known Allergies    Intolerances        LABS:                        10.9   11.6  )-----------( 340      ( 2017 02:31 )             32.0     04-13    137  |  102  |  12  ----------------------------<  119<H>  3.3<L>   |  27  |  0.66    Ca    8.8      2017 02:31  Mg     2.0     04-13    TPro  7.1  /  Alb  2.7<L>  /  TBili  0.6  /  DBili  x   /  AST  21  /  ALT  32  /  AlkPhos  127<H>      PT/INR - ( 2017 13:24 )   PT: 16.3 sec;   INR: 1.46          PTT - ( 2017 02:31 )  PTT:56.3 sec  Urinalysis Basic - ( 2017 13:42 )    Color: Yellow / Appearance: Clear / S.025 / pH: x  Gluc: x / Ketone: NEGATIVE  / Bili: Small / Urobili: 0.2 E.U./dL   Blood: x / Protein: NEGATIVE mg/dL / Nitrite: NEGATIVE   Leuk Esterase: Trace / RBC: < 5 /HPF / WBC < 5 /HPF   Sq Epi: x / Non Sq Epi: Rare /HPF / Bacteria: Present /HPF        DIAGNOSTICS/IMAGING (EKG, Echo, Radiology studies) reviewed.

## 2017-04-14 ENCOUNTER — TRANSCRIPTION ENCOUNTER (OUTPATIENT)
Age: 52
End: 2017-04-14

## 2017-04-14 LAB
ANION GAP SERPL CALC-SCNC: 8 MMOL/L — LOW (ref 9–16)
APTT BLD: 68.7 SEC — HIGH (ref 27.5–37.4)
APTT BLD: 75 SEC — HIGH (ref 27.5–37.4)
BASOPHILS NFR BLD AUTO: 0.8 % — SIGNIFICANT CHANGE UP (ref 0–2)
BUN SERPL-MCNC: 9 MG/DL — SIGNIFICANT CHANGE UP (ref 7–23)
CALCIUM SERPL-MCNC: 9.4 MG/DL — SIGNIFICANT CHANGE UP (ref 8.5–10.5)
CHLORIDE SERPL-SCNC: 101 MMOL/L — SIGNIFICANT CHANGE UP (ref 96–108)
CO2 SERPL-SCNC: 26 MMOL/L — SIGNIFICANT CHANGE UP (ref 22–31)
CREAT SERPL-MCNC: 0.6 MG/DL — SIGNIFICANT CHANGE UP (ref 0.5–1.3)
EOSINOPHIL NFR BLD AUTO: 4.8 % — SIGNIFICANT CHANGE UP (ref 0–6)
GLUCOSE SERPL-MCNC: 105 MG/DL — HIGH (ref 70–99)
HCT VFR BLD CALC: 36.4 % — LOW (ref 39–50)
HGB BLD-MCNC: 12.3 G/DL — LOW (ref 13–17)
LYMPHOCYTES # BLD AUTO: 28.9 % — SIGNIFICANT CHANGE UP (ref 13–44)
MAGNESIUM SERPL-MCNC: 2.2 MG/DL — SIGNIFICANT CHANGE UP (ref 1.6–2.4)
MCHC RBC-ENTMCNC: 30.1 PG — SIGNIFICANT CHANGE UP (ref 27–34)
MCHC RBC-ENTMCNC: 33.8 G/DL — SIGNIFICANT CHANGE UP (ref 32–36)
MCV RBC AUTO: 89.2 FL — SIGNIFICANT CHANGE UP (ref 80–100)
MONOCYTES NFR BLD AUTO: 8.4 % — SIGNIFICANT CHANGE UP (ref 2–14)
NEUTROPHILS NFR BLD AUTO: 57.1 % — SIGNIFICANT CHANGE UP (ref 43–77)
PLATELET # BLD AUTO: 363 K/UL — SIGNIFICANT CHANGE UP (ref 150–400)
POTASSIUM SERPL-MCNC: 4.1 MMOL/L — SIGNIFICANT CHANGE UP (ref 3.5–5.3)
POTASSIUM SERPL-SCNC: 4.1 MMOL/L — SIGNIFICANT CHANGE UP (ref 3.5–5.3)
RBC # BLD: 4.08 M/UL — LOW (ref 4.2–5.8)
RBC # FLD: 12.9 % — SIGNIFICANT CHANGE UP (ref 10.3–16.9)
SODIUM SERPL-SCNC: 135 MMOL/L — SIGNIFICANT CHANGE UP (ref 135–145)
WBC # BLD: 10.5 K/UL — SIGNIFICANT CHANGE UP (ref 3.8–10.5)
WBC # FLD AUTO: 10.5 K/UL — SIGNIFICANT CHANGE UP (ref 3.8–10.5)

## 2017-04-14 PROCEDURE — 71275 CT ANGIOGRAPHY CHEST: CPT | Mod: 26

## 2017-04-14 PROCEDURE — 99233 SBSQ HOSP IP/OBS HIGH 50: CPT | Mod: GC

## 2017-04-14 PROCEDURE — 99232 SBSQ HOSP IP/OBS MODERATE 35: CPT

## 2017-04-14 PROCEDURE — 93970 EXTREMITY STUDY: CPT | Mod: 26

## 2017-04-14 RX ORDER — SODIUM CHLORIDE 9 MG/ML
1000 INJECTION INTRAMUSCULAR; INTRAVENOUS; SUBCUTANEOUS
Qty: 0 | Refills: 0 | Status: DISCONTINUED | OUTPATIENT
Start: 2017-04-14 | End: 2017-04-14

## 2017-04-14 RX ORDER — ESOMEPRAZOLE MAGNESIUM 40 MG/1
0 CAPSULE, DELAYED RELEASE ORAL
Qty: 0 | Refills: 0 | COMMUNITY

## 2017-04-14 RX ADMIN — LISINOPRIL 20 MILLIGRAM(S): 2.5 TABLET ORAL at 06:32

## 2017-04-14 RX ADMIN — Medication 3 MILLIGRAM(S): at 21:48

## 2017-04-14 RX ADMIN — HYDROMORPHONE HYDROCHLORIDE 2 MILLIGRAM(S): 2 INJECTION INTRAMUSCULAR; INTRAVENOUS; SUBCUTANEOUS at 02:50

## 2017-04-14 RX ADMIN — SIMVASTATIN 40 MILLIGRAM(S): 20 TABLET, FILM COATED ORAL at 21:14

## 2017-04-14 RX ADMIN — HEPARIN SODIUM 17 UNIT(S)/HR: 5000 INJECTION INTRAVENOUS; SUBCUTANEOUS at 04:00

## 2017-04-14 RX ADMIN — SODIUM CHLORIDE 125 MILLILITER(S): 9 INJECTION INTRAMUSCULAR; INTRAVENOUS; SUBCUTANEOUS at 21:14

## 2017-04-14 RX ADMIN — HYDROMORPHONE HYDROCHLORIDE 2 MILLIGRAM(S): 2 INJECTION INTRAMUSCULAR; INTRAVENOUS; SUBCUTANEOUS at 03:50

## 2017-04-14 RX ADMIN — Medication 120 MILLIGRAM(S): at 06:32

## 2017-04-14 NOTE — DISCHARGE NOTE ADULT - PROVIDER TOKENS
TOKEN:'[id number]:'[id number]' TOKEN:'[id number]:'[id number]'

## 2017-04-14 NOTE — PROGRESS NOTE ADULT - PROBLEM SELECTOR PLAN 2
Unknown etiology. No history of trauma, mononucleosis screen negative. May be associated with pancreatitis (recent diagnosis).  - no indication for antibiotics at this time.  - IR consulted; believe collection in abdomen to be hematoma over abscess. Stated it is a technically difficult procedure with a high risk of pneumothorax. Accordingly, defer IR today and observe. Should clinical condition worsen, consider IR procedure. Isolated fever yesterday, although patient reports Tmax 101 at home. Differential includes infected perisplenic fluid collection vs. diarrhea/gastroenteritis vs. pulmonary embolism. Diarrhea has resolved; collection on CT abdomen not indicative of abscess. CT abdomen with incidental finding of skip lesions, which can be seen with Crohn disease.  - IR consulted; believe collection in abdomen to be hematoma over abscess. Stated it is a technically difficult procedure with a high risk of pneumothorax. Accordingly, defer IR today and observe. Should clinical condition worsen, consider IR procedure.  - afebrile for past 24 hours; continue to monitor

## 2017-04-14 NOTE — PROGRESS NOTE ADULT - PROBLEM SELECTOR PLAN 1
-d/w Dr. Oates and primary team regarding plan for anticoagulation given has received 2.5 mos of a/c for PE  -will plan to obtain LE dopplers and CT chest with IV contrast to evaluate for DVT and PE respectively - if neg for DVT and no PE or only evidence of chronic PE will discontinue a/c in the setting of high risk of re-bleed spleen vs risk for thrombosis  -hypercoagulable w/u data still pending completion  -will follow along if evidence of any acute thrombosis and d/w team regarding plan for a/c

## 2017-04-14 NOTE — DISCHARGE NOTE ADULT - CARE PLAN
Principal Discharge DX:	Hematoma of spleen, closed, subsequent encounter  Goal:	Follow up in outpatient setting  Instructions for follow-up, activity and diet:	You were admitted for a contained bleed, or hematoma, around the spleen. Our radiology and surgical teams have evaluated you and deemed that this is not a surgical case at this time. You are stable and, from their perspective, may continue your care on an outpatient basis. It is imperative that you see Dr. Obregon, your hematologist, in the next week to discuss further management and conduct additional blood tests. However, should you feel weak, feel like your heart is beating quickly, or feel short of breath, please return to the emergency room immediately for evaluation. Please call her office (number below) to schedule an appointment.  Secondary Diagnosis:	Pulmonary embolism  Instructions for follow-up, activity and diet:	You have been diagnosed with a pulmonary embolism in the past. Our repeat scan revealed that ________________________. In the absence of pulmonary embolism, it is safe to stop taking your coumadin. Please follow up with Dr. Obregon as an outpatient in one week's time.  Secondary Diagnosis:	Large bowel mucosal friability  Instructions for follow-up, activity and diet:	On CT scan, we found that your large bowel has regions of what are called "skip lesions". These may sometimes be caused by inflammatory conditions. At present, you are asymptomatic. Accordingly, we recommend that you follow up with either your primary care doctor or a gastroenterologist (number of one listed below) for further evaluation. Principal Discharge DX:	Hematoma of spleen, closed, subsequent encounter  Goal:	Follow up in outpatient setting  Instructions for follow-up, activity and diet:	You were admitted for a contained bleed, or hematoma, around the spleen. Our radiology and surgical teams have evaluated you and deemed that this is not a surgical case at this time. You are stable and, from their perspective, may continue your care on an outpatient basis. It is imperative that you see Dr. Obregon, your hematologist, in the next week to discuss further management and conduct additional blood tests. However, should you feel weak, feel like your heart is beating quickly, or feel short of breath, please return to the emergency room immediately for evaluation. Please call her office (number below) to schedule an appointment.  Secondary Diagnosis:	Pulmonary embolism  Instructions for follow-up, activity and diet:	You have been diagnosed with a pulmonary embolism in the past. Our repeat scan revealed that You do NOT have a pulmonary embolism anymore. In the absence of pulmonary embolism, it is safe to stop taking your coumadin. Please follow up with Dr. Obregon as an outpatient in one week's time.  Secondary Diagnosis:	Large bowel mucosal friability  Instructions for follow-up, activity and diet:	On CT scan, we found that your large bowel has regions of what are called "skip lesions". These may sometimes be caused by inflammatory conditions. At present, you are asymptomatic. Accordingly, we recommend that you follow up with either your primary care doctor or a gastroenterologist (number of one listed below) for further evaluation.  Secondary Diagnosis:	Aortic root aneurysm  Instructions for follow-up, activity and diet:	The CT scan showed a aortic root aneurysm that was 4.1 cm in diameter. This does not need any acute intervention at this point in time. Just make sure you follow up with Dr. Obregon as above. You will need a repeat echocardiogram to monitor the aneurysm over time. Principal Discharge DX:	Hematoma of spleen, closed, subsequent encounter  Goal:	Follow up in outpatient setting  Instructions for follow-up, activity and diet:	You were admitted for a contained bleed, or hematoma, around the spleen. Our radiology and surgical teams have evaluated you and deemed that this is not a surgical case at this time. You are stable and, from their perspective, may continue your care on an outpatient basis. It is imperative that you see Dr. Obregon, your hematologist, in the next week to discuss further management and conduct additional blood tests. However, should you feel weak, feel like your heart is beating quickly, or feel short of breath, please return to the emergency room immediately for evaluation. Please call her office (number below) to schedule an appointment.  Secondary Diagnosis:	Pulmonary embolism  Instructions for follow-up, activity and diet:	You have been diagnosed with a pulmonary embolism in the past. Our repeat scan revealed that You do NOT have a pulmonary embolism anymore. In the absence of pulmonary embolism, it is safe to stop taking your coumadin. Please follow up with Dr. Obregon as an outpatient in one week's time.  Secondary Diagnosis:	Large bowel mucosal friability  Instructions for follow-up, activity and diet:	On CT scan, we found that your large bowel has regions of what are called "skip lesions". These may sometimes be caused by inflammatory conditions. At present, you are asymptomatic. Accordingly, we recommend that you follow up with either your primary care doctor or a gastroenterologist (number of one listed below) for further evaluation.  Secondary Diagnosis:	Aortic root aneurysm  Instructions for follow-up, activity and diet:	The CT scan showed a aortic root aneurysm that was 4.1 cm in diameter. This does not need any acute intervention at this point in time. Just make sure you follow up with Dr. Obregon as above. You will need a repeat echocardiogram in a few months to monitor the aneurysm over time. Principal Discharge DX:	Hematoma of spleen, closed, subsequent encounter  Goal:	Follow up in outpatient setting  Instructions for follow-up, activity and diet:	You were admitted for a contained bleed, or hematoma, around the spleen. Our radiology and surgical teams have evaluated you and deemed that this is not a surgical case at this time. You are stable and, from their perspective, may continue your care on an outpatient basis. It is imperative that you see Dr. Obregon, your hematologist, in the next week to discuss further management and conduct additional blood tests. However, should you feel weak, feel like your heart is beating quickly, or feel short of breath, please return to the emergency room immediately for evaluation. Please call her office (number below) to schedule an appointment.  Secondary Diagnosis:	Pulmonary embolism  Instructions for follow-up, activity and diet:	You have been diagnosed with a pulmonary embolism in the past. Our repeat scan revealed that You do NOT have a pulmonary embolism anymore. In the absence of pulmonary embolism, it is safe to stop taking your coumadin. Please follow up with Dr. Obregon as an outpatient in one week's time.  Secondary Diagnosis:	Large bowel mucosal friability  Instructions for follow-up, activity and diet:	On CT scan, we found that your large bowel has regions of what are called "skip lesions". These may sometimes be caused by inflammatory conditions. At present, you are asymptomatic. Accordingly, we recommend that you follow up with either your primary care doctor or a gastroenterologist (number of one listed below) for further evaluation.  Secondary Diagnosis:	Aortic root aneurysm  Instructions for follow-up, activity and diet:	The CT scan showed a aortic root aneurysm that was 4.1 cm in diameter. This does not need any acute intervention at this point in time. Just make sure you follow up with your outpatient cardiologist. You will need a repeat echocardiogram in a few months to monitor the aneurysm over time. Principal Discharge DX:	Hematoma of spleen, closed, subsequent encounter  Goal:	Follow up in outpatient setting  Instructions for follow-up, activity and diet:	You were admitted for a contained bleed, or hematoma, around the spleen. Our radiology and surgical teams have evaluated you and deemed that this does not require surgery at this time. You are stable and, from their perspective, may continue your care on an outpatient basis. It is imperative that you see Dr. Obregon, your hematologist, in the next week to discuss further management and conduct additional blood tests. However, should you feel weak, feel like your heart is beating quickly, or feel short of breath, please return to the emergency room immediately for evaluation. Please call her office (number below) to schedule an appointment. Additionally, please follow up with your surgeon (Dr. Chun) within the next week.  Secondary Diagnosis:	Pulmonary embolism  Instructions for follow-up, activity and diet:	You have been diagnosed with a pulmonary embolism in the past. Our repeat scan revealed that you do NOT have a pulmonary embolism anymore. In the absence of pulmonary embolism, it is safe to stop taking your coumadin. Please follow up with Dr. Obregon as an outpatient in one week's time.  Secondary Diagnosis:	Large bowel mucosal friability  Instructions for follow-up, activity and diet:	On CT scan, we found that your large bowel has regions of what are called "skip lesions". These may sometimes be caused by inflammatory conditions. At present, you are without symptoms. Accordingly, we recommend that you follow up with either your primary care doctor or a gastroenterologist (number of one listed below) for further evaluation.  Secondary Diagnosis:	Aortic root aneurysm  Instructions for follow-up, activity and diet:	The CT scan showed a aortic root aneurysm that was 4.1 cm in diameter. This does not need any acute intervention at this point in time. However, it is important that you follow up with an outpatient cardiologist. The number for Dr. Ino Smyth, a cardiologist, is below; if you already have a cardiologist, you may follow up with them. You will need a repeat echocardiogram (ultrasound of the heart) in a few months to monitor the aneurysm over time.

## 2017-04-14 NOTE — PROGRESS NOTE ADULT - ATTENDING COMMENTS
Splenic hematoma: If CTPE negative and LE U/S neg for DVT, will stop A/C 2/2 to high risk of continued bleeding. Cont to closely monitor CBC and splenic hematoma outpt. Discussed with patient PCP Dr. Nelson.     PE: Repeat CTPE and if negative and LE doppler negative will stop A/C given high risk of cont bleeding. Risk and benefits discussed with H/O and patient, including PE, death, bleeding, CHRISTOPHER and radiation.     Remainder as above.

## 2017-04-14 NOTE — DISCHARGE NOTE ADULT - CARE PROVIDERS DIRECT ADDRESSES
[email or web address],[email or web address]

## 2017-04-14 NOTE — PROGRESS NOTE ADULT - SUBJECTIVE AND OBJECTIVE BOX
SUBJECTIVE: Pt seen and examined at bedside. No overnight events. Pt's pain improved. No f/c/n/v. +BM/+Flatus    Vital Signs Last 24 Hrs  T(C): 37, Max: 37 ( @ 05:14)  T(F): 98.6, Max: 98.6 ( @ 05:14)  HR: 70 (70 - 86)  BP: 117/78 (117/78 - 132/86)  BP(mean): --  RR: 17 (16 - 18)  SpO2: 95% (95% - 98%)    PHYSICAL EXAM    Gen: NAD  CV: RRR  Pulm: Regular non-labored respirations  Abd: Soft, NT/ND  Ext: WWP      LABS:    2017: AM Labs Pending    2017:                      12.1   10.9  )-----------( 362      ( 2017 17:16 )             36.2     -    137  |  102  |  12  ----------------------------<  119<H>  3.3<L>   |  27  |  0.66    Ca    8.8      2017 02:31  Mg     2.0         TPro  7.1  /  Alb  2.7<L>  /  TBili  0.6  /  DBili  x   /  AST  21  /  ALT  32  /  AlkPhos  127<H>      PT/INR - ( 2017 13:24 )   PT: 16.3 sec;   INR: 1.46          PTT - ( 2017 23:02 )  PTT:65.0 sec  Urinalysis Basic - ( 2017 13:42 )    Color: Yellow / Appearance: Clear / S.025 / pH: x  Gluc: x / Ketone: NEGATIVE  / Bili: Small / Urobili: 0.2 E.U./dL   Blood: x / Protein: NEGATIVE mg/dL / Nitrite: NEGATIVE   Leuk Esterase: Trace / RBC: < 5 /HPF / WBC < 5 /HPF   Sq Epi: x / Non Sq Epi: Rare /HPF / Bacteria: Present /HPF        ASSESSMENT AND PLAN  1M pt with atraumatic splenic hematoma s/p prior drainage 2017 admitted with continued fevers as outpatient and continued anterior splenic fluid collection. However pt has been afebrile since admission and symptomatically improving   -No acute general surgical intervention at this time as pt symptomatically improving and plan to conserve spleen if possible  -Further management/workup per primary team  -Please call with further surgical questions/concerns  -D/W Surgery Chief and with Dr. Hu

## 2017-04-14 NOTE — DISCHARGE NOTE ADULT - PATIENT PORTAL LINK FT
“You can access the [facility] Patient Portal, offered by [facility], by registering with the following website: [email or web address]”

## 2017-04-14 NOTE — DISCHARGE NOTE ADULT - PLAN OF CARE
Follow up in outpatient setting You were admitted for a contained bleed, or hematoma, around the spleen. Our radiology and surgical teams have evaluated you and deemed that this is not a surgical case at this time. You are stable and, from their perspective, may continue your care on an outpatient basis. It is imperative that you see Dr. Obregon, your hematologist, in the next week to discuss further management and conduct additional blood tests. However, should you feel weak, feel like your heart is beating quickly, or feel short of breath, please return to the emergency room immediately for evaluation. Please call her office (number below) to schedule an appointment. You have been diagnosed with a pulmonary embolism in the past. Our repeat scan revealed that ________________________. In the absence of pulmonary embolism, it is safe to stop taking your coumadin. Please follow up with Dr. Obregon as an outpatient in one week's time. On CT scan, we found that your large bowel has regions of what are called "skip lesions". These may sometimes be caused by inflammatory conditions. At present, you are asymptomatic. Accordingly, we recommend that you follow up with either your primary care doctor or a gastroenterologist (number of one listed below) for further evaluation. The CT scan showed a aortic root aneurysm that was 4.1 cm in diameter. This does not need any acute intervention at this point in time. Just make sure you follow up with Dr. Obregon as above. You will need a repeat echocardiogram to monitor the aneurysm over time. You have been diagnosed with a pulmonary embolism in the past. Our repeat scan revealed that You do NOT have a pulmonary embolism anymore. In the absence of pulmonary embolism, it is safe to stop taking your coumadin. Please follow up with Dr. Obregon as an outpatient in one week's time. The CT scan showed a aortic root aneurysm that was 4.1 cm in diameter. This does not need any acute intervention at this point in time. Just make sure you follow up with Dr. Obregon as above. You will need a repeat echocardiogram in a few months to monitor the aneurysm over time. The CT scan showed a aortic root aneurysm that was 4.1 cm in diameter. This does not need any acute intervention at this point in time. Just make sure you follow up with your outpatient cardiologist. You will need a repeat echocardiogram in a few months to monitor the aneurysm over time. You were admitted for a contained bleed, or hematoma, around the spleen. Our radiology and surgical teams have evaluated you and deemed that this does not require surgery at this time. You are stable and, from their perspective, may continue your care on an outpatient basis. It is imperative that you see Dr. Obregon, your hematologist, in the next week to discuss further management and conduct additional blood tests. However, should you feel weak, feel like your heart is beating quickly, or feel short of breath, please return to the emergency room immediately for evaluation. Please call her office (number below) to schedule an appointment. Additionally, please follow up with your surgeon (Dr. Chun) within the next week. On CT scan, we found that your large bowel has regions of what are called "skip lesions". These may sometimes be caused by inflammatory conditions. At present, you are without symptoms. Accordingly, we recommend that you follow up with either your primary care doctor or a gastroenterologist (number of one listed below) for further evaluation. You have been diagnosed with a pulmonary embolism in the past. Our repeat scan revealed that you do NOT have a pulmonary embolism anymore. In the absence of pulmonary embolism, it is safe to stop taking your coumadin. Please follow up with Dr. Obregon as an outpatient in one week's time. The CT scan showed a aortic root aneurysm that was 4.1 cm in diameter. This does not need any acute intervention at this point in time. However, it is important that you follow up with an outpatient cardiologist. The number for Dr. Ino Smyth, a cardiologist, is below; if you already have a cardiologist, you may follow up with them. You will need a repeat echocardiogram (ultrasound of the heart) in a few months to monitor the aneurysm over time.

## 2017-04-14 NOTE — PROGRESS NOTE ADULT - PROBLEM SELECTOR PLAN 1
Isolated fever yesterday, although patient reports Tmax 101 at home. Differential includes infected perisplenic fluid collection vs. diarrhea/gastroenteritis vs. pulmonary embolism. Diarrhea has resolved; collection on CT abdomen not indicative of abscess. CT abdomen with incidental finding of skip lesions, which can be seen with Crohn disease.  - IR consulted; believe collection in abdomen to be hematoma over abscess. Stated it is a technically difficult procedure with a high risk of pneumothorax. Accordingly, defer IR today and observe. Should clinical condition worsen, consider IR procedure.  - afebrile for past 24 hours; continue to monitor Unknown etiology. No history of trauma, mononucleosis screen negative. May be associated with pancreatitis (recent diagnosis).   - no indication for antibiotics at this time.  - IR consulted; believe collection in abdomen to be hematoma over abscess. Stated it is a technically difficult procedure with a high risk of pneumothorax. Accordingly, defer IR today and observe. Should clinical condition worsen, consider IR procedure.

## 2017-04-14 NOTE — PROGRESS NOTE ADULT - PROBLEM SELECTOR PLAN 4
Resolved. Consideration for infectious cause of diarrhea low.  - if diarrhea continues, check fecal Aden stain, stool culture, stool O&P. If worsened, assess C. diff, although very low suspicion, given IV antibiotics use during hospitalizations during past two months (although was not discharged on antibiotics). - concern for Crohn disease given findings of skip lesions on CT abdomen. Will consider GI consult if symptoms worsen; otherwise, outpatient assessment recommended.

## 2017-04-14 NOTE — PROGRESS NOTE ADULT - SUBJECTIVE AND OBJECTIVE BOX
INTERVAL HPI/OVERNIGHT EVENTS:  DORIE overnight. At present, the patient denies any shortness of breath, cough, fevers, chills, chest pain, N/V/D, and/or urinary symptoms. One loose stool overnight.    VITAL SIGNS:  T(F): 98.6  HR: 70  BP: 117/78  RR: 17  SpO2: 95%  Wt(kg): --    PHYSICAL EXAM:  GENERAL: Well-developed and well-nourished, in no apparent distress.  HEENT: Head is normocephalic and atraumatic, with extraocular muscle movements intact bilaterally. Pupils are equal, round, and reactive to light and accommodation. Oral mucosa moist and without lesions. Neck is supple, without appreciable lymphadenopathy or thyromegaly.  PULMONARY/THORAX: Clear to auscultation bilaterally.  CARDIOVASCULAR: Regular rate and rhythm without murmurs, rubs, or gallops.  ABDOMEN: Soft, minimally tender to palpation in epigastrium/LUQ.  Normoactive bowel sounds.  No palpable hepatosplenomegaly.  EXTREMITIES: Without cyanosis, clubbing, or edema.  NEUROLOGIC: A&Ox3; CNs II-XII are grossly intact.  DERMATOLOGIC: No cutaneous lesions noted on exposed skin.    MEDICATIONS  (STANDING):  simvastatin 40milliGRAM(s) Oral at bedtime  lisinopril 20milliGRAM(s) Oral daily  diltiazem   CD 120milliGRAM(s) Oral daily  heparin  Infusion 1700Unit(s)/Hr IV Continuous <Continuous>    MEDICATIONS  (PRN):  nicotine  Polacrilex Gum 2milliGRAM(s) Oral every 4 hours PRN cigarette craving  HYDROmorphone   Tablet 2milliGRAM(s) Oral every 4 hours PRN Severe Pain (7 - 10)  melatonin 3milliGRAM(s) Oral at bedtime PRN Insomnia      Allergies    No Known Allergies    Intolerances        LABS:                        12.3   10.5  )-----------( 363      ( 2017 07:52 )             36.4     04-14    135  |  101  |  9   ----------------------------<  105<H>  4.1   |  26  |  0.60    Ca    9.4      2017 07:52  Mg     2.2     04-14    TPro  7.1  /  Alb  2.7<L>  /  TBili  0.6  /  DBili  x   /  AST  21  /  ALT  32  /  AlkPhos  127<H>  04-13    PT/INR - ( 2017 13:24 )   PT: 16.3 sec;   INR: 1.46          PTT - ( 2017 07:52 )  PTT:75.0 sec  Urinalysis Basic - ( 2017 13:42 )    Color: Yellow / Appearance: Clear / S.025 / pH: x  Gluc: x / Ketone: NEGATIVE  / Bili: Small / Urobili: 0.2 E.U./dL   Blood: x / Protein: NEGATIVE mg/dL / Nitrite: NEGATIVE   Leuk Esterase: Trace / RBC: < 5 /HPF / WBC < 5 /HPF   Sq Epi: x / Non Sq Epi: Rare /HPF / Bacteria: Present /HPF        DIAGNOSTICS/IMAGING (EKG, Echo, Radiology studies) reviewed.

## 2017-04-14 NOTE — DISCHARGE NOTE ADULT - MEDICATION SUMMARY - MEDICATIONS TO STOP TAKING
I will STOP taking the medications listed below when I get home from the hospital:    Coumadin 5 mg oral tablet  -- 1 tab(s) by mouth once a day at bedtime

## 2017-04-14 NOTE — PROGRESS NOTE ADULT - PROBLEM SELECTOR PLAN 2
-clinically significantly improved so surgery not planning for further intervention  -would suggest close outpt f/u with surgery and repeat imaging possibly with angiogram to identify source of bleeding vessel if present    Discussed with Dr. Obregon

## 2017-04-14 NOTE — PROGRESS NOTE ADULT - SUBJECTIVE AND OBJECTIVE BOX
Heme/Onc Progress Note (Dr. Obregon)  Discussed with Dr. Obregon and plan reviewed with the primary team.    The patient was seen and examined.      The patient is a 51y Male with history of PE admitted with new splenic hemorrhage. Today feels well without abdominal pain.  No new events overnight.  Planned for CT chest and LE dopplers to evaluate for PE and DVT.    Allergies    No Known Allergies    Intolerances        Medications:  MEDICATIONS  (STANDING):  simvastatin 40milliGRAM(s) Oral at bedtime  lisinopril 20milliGRAM(s) Oral daily  diltiazem   CD 120milliGRAM(s) Oral daily  heparin  Infusion 1700Unit(s)/Hr IV Continuous <Continuous>  sodium chloride 0.9%. 1000milliLiter(s) IV Continuous <Continuous>    MEDICATIONS  (PRN):  nicotine  Polacrilex Gum 2milliGRAM(s) Oral every 4 hours PRN cigarette craving  HYDROmorphone   Tablet 2milliGRAM(s) Oral every 4 hours PRN Severe Pain (7 - 10)  melatonin 3milliGRAM(s) Oral at bedtime PRN Insomnia    heparin  Infusion 1700Unit(s)/Hr IV Continuous <Continuous>          PHYSICAL EXAM:    T(F): 98.4, Max: 98.6 (04-14 @ 05:14)  HR: 87 (70 - 87)  BP: 120/88 (117/78 - 131/83)  RR: 16 (16 - 18)  SpO2: 96% (95% - 98%)  Wt(kg): --      Gen: well developed, well nourished, comfortable appearing  HEENT: normocephalic/atraumatic, no conjunctival pallor, no scleral icterus  Neck: supple, no masses  Cardiovascular: RR, nl S1S2, no murmurs/rubs/gallops  Respiratory: clear air entry b/l  Gastrointestinal: BS+, soft, NTND, no masses, no splenomegaly, , no hepatomegaly, no evidence for ascites  Extremities: no clubbing/cyanosis, no edema, no calf tenderness  Vascular:  DP/PT 2+ b/l  Neurological: CN 2-12 grossly intact, no focal deficits  Skin: no rash on visible skin  Lymph Nodes:  no cervical/supraclavicular LAD  Musculoskeletal:  full ROM  Psychiatric:  mood stable    Labs:                          12.3   10.5  )-----------( 363      ( 14 Apr 2017 07:52 )             36.4     CBC Full  -  ( 14 Apr 2017 07:52 )  WBC Count : 10.5 K/uL  Hemoglobin : 12.3 g/dL  Hematocrit : 36.4 %  Platelet Count - Automated : 363 K/uL  Mean Cell Volume : 89.2 fL  Mean Cell Hemoglobin : 30.1 pg  Mean Cell Hemoglobin Concentration : 33.8 g/dL  Auto Neutrophil # : x  Auto Lymphocyte # : x  Auto Monocyte # : x  Auto Eosinophil # : x  Auto Basophil # : x  Auto Neutrophil % : 57.1 %  Auto Lymphocyte % : 28.9 %  Auto Monocyte % : 8.4 %  Auto Eosinophil % : 4.8 %  Auto Basophil % : 0.8 %    PTT - ( 14 Apr 2017 15:03 )  PTT:68.7 sec    04-14    135  |  101  |  9   ----------------------------<  105<H>  4.1   |  26  |  0.60    Ca    9.4      14 Apr 2017 07:52  Mg     2.2     04-14    TPro  7.1  /  Alb  2.7<L>  /  TBili  0.6  /  DBili  x   /  AST  21  /  ALT  32  /  AlkPhos  127<H>  04-13

## 2017-04-14 NOTE — PROGRESS NOTE ADULT - PROBLEM SELECTOR PLAN 3
INR subtherapeutic on coumadin.  - Heparin gtt at present. Given risk of enhancement of hematoma, will evaluate with regular CBCs. Discussed with IR; they do not believe patient is actively bleeding as per scans. Exam and vitals do not indicate hemodynamic instability/hypovolemia.  - follow PTTs, dose heparin for goal 60-80  - heme following for possibility of stopping AC as we are approaching 3 months of pharmacologic management

## 2017-04-14 NOTE — DISCHARGE NOTE ADULT - MEDICATION SUMMARY - MEDICATIONS TO TAKE
I will START or STAY ON the medications listed below when I get home from the hospital:    lisinopril 20 mg oral tablet  -- 1 tab(s) by mouth once a day  -- Indication: For Hypertension    diltiazem 24 hour extended release  -- 120 milligram(s) by mouth once a day  -- Indication: For Hypertension    simvastatin  --  by mouth   -- Indication: For Hyperlipidemia

## 2017-04-14 NOTE — DISCHARGE NOTE ADULT - ADDITIONAL INSTRUCTIONS
Follow up with the physicians as above.  If you develop chest pain, shortness of breath, palpitations, nausea, vomiting, diarrhea, fever, or chills please go to the emergency department. If you notice the same symptoms or you have any acute weakness, facial droop, vision changes, or headache, please go to the emergency department.

## 2017-04-14 NOTE — DISCHARGE NOTE ADULT - CARE PROVIDER_API CALL
Nenita Obregon), Hematology; Internal Medicine  [address] [place] [address]  Phone: [phone]  Fax: [phone]    Jono Navarro), Gastroenterology; Internal Medicine  [address] [place] [address]  Phone: [phone]  Fax: [phone] Nenita Obregon), Hematology; Internal Medicine  [address] [place] [address]  Phone: [phone]  Fax: [phone]    Jono Navarro), Gastroenterology; Internal Medicine  [address] [place] [address]  Phone: [phone]  Fax: [phone]    Ino Smyth), Cardiovascular Disease; Internal Medicine  [address] [place] [address]  Phone: [phone]  Fax: [phone]

## 2017-04-15 VITALS
SYSTOLIC BLOOD PRESSURE: 125 MMHG | DIASTOLIC BLOOD PRESSURE: 82 MMHG | OXYGEN SATURATION: 96 % | HEART RATE: 84 BPM | TEMPERATURE: 98 F | RESPIRATION RATE: 16 BRPM

## 2017-04-15 PROCEDURE — 85730 THROMBOPLASTIN TIME PARTIAL: CPT

## 2017-04-15 PROCEDURE — 93005 ELECTROCARDIOGRAM TRACING: CPT

## 2017-04-15 PROCEDURE — 80053 COMPREHEN METABOLIC PANEL: CPT

## 2017-04-15 PROCEDURE — 99285 EMERGENCY DEPT VISIT HI MDM: CPT | Mod: 25

## 2017-04-15 PROCEDURE — 85379 FIBRIN DEGRADATION QUANT: CPT

## 2017-04-15 PROCEDURE — 80048 BASIC METABOLIC PNL TOTAL CA: CPT

## 2017-04-15 PROCEDURE — 86850 RBC ANTIBODY SCREEN: CPT

## 2017-04-15 PROCEDURE — 71275 CT ANGIOGRAPHY CHEST: CPT

## 2017-04-15 PROCEDURE — 93970 EXTREMITY STUDY: CPT

## 2017-04-15 PROCEDURE — 36415 COLL VENOUS BLD VENIPUNCTURE: CPT

## 2017-04-15 PROCEDURE — 86901 BLOOD TYPING SEROLOGIC RH(D): CPT

## 2017-04-15 PROCEDURE — 86900 BLOOD TYPING SEROLOGIC ABO: CPT

## 2017-04-15 PROCEDURE — 87086 URINE CULTURE/COLONY COUNT: CPT

## 2017-04-15 PROCEDURE — 85025 COMPLETE CBC W/AUTO DIFF WBC: CPT

## 2017-04-15 PROCEDURE — 85652 RBC SED RATE AUTOMATED: CPT

## 2017-04-15 PROCEDURE — 83605 ASSAY OF LACTIC ACID: CPT

## 2017-04-15 PROCEDURE — 74177 CT ABD & PELVIS W/CONTRAST: CPT

## 2017-04-15 PROCEDURE — 82150 ASSAY OF AMYLASE: CPT

## 2017-04-15 PROCEDURE — 83735 ASSAY OF MAGNESIUM: CPT

## 2017-04-15 PROCEDURE — 83690 ASSAY OF LIPASE: CPT

## 2017-04-15 PROCEDURE — 81001 URINALYSIS AUTO W/SCOPE: CPT

## 2017-04-15 PROCEDURE — 96374 THER/PROPH/DIAG INJ IV PUSH: CPT | Mod: XU

## 2017-04-15 PROCEDURE — 85610 PROTHROMBIN TIME: CPT

## 2017-04-15 PROCEDURE — 85027 COMPLETE CBC AUTOMATED: CPT

## 2017-04-15 PROCEDURE — 82607 VITAMIN B-12: CPT

## 2017-04-15 PROCEDURE — 83036 HEMOGLOBIN GLYCOSYLATED A1C: CPT

## 2017-04-15 PROCEDURE — 80307 DRUG TEST PRSMV CHEM ANLYZR: CPT

## 2017-04-15 PROCEDURE — 71045 X-RAY EXAM CHEST 1 VIEW: CPT

## 2017-04-15 RX ADMIN — Medication 120 MILLIGRAM(S): at 06:33

## 2017-04-15 RX ADMIN — LISINOPRIL 20 MILLIGRAM(S): 2.5 TABLET ORAL at 06:33

## 2017-04-18 ENCOUNTER — APPOINTMENT (OUTPATIENT)
Dept: INTERNAL MEDICINE | Facility: CLINIC | Age: 52
End: 2017-04-18

## 2017-04-18 DIAGNOSIS — Z98.61 CORONARY ANGIOPLASTY STATUS: ICD-10-CM

## 2017-04-18 DIAGNOSIS — R50.9 FEVER, UNSPECIFIED: ICD-10-CM

## 2017-04-18 DIAGNOSIS — F17.200 NICOTINE DEPENDENCE, UNSPECIFIED, UNCOMPLICATED: ICD-10-CM

## 2017-04-18 DIAGNOSIS — I10 ESSENTIAL (PRIMARY) HYPERTENSION: ICD-10-CM

## 2017-04-18 DIAGNOSIS — F12.10 CANNABIS ABUSE, UNCOMPLICATED: ICD-10-CM

## 2017-04-18 DIAGNOSIS — Z86.711 PERSONAL HISTORY OF PULMONARY EMBOLISM: ICD-10-CM

## 2017-04-18 DIAGNOSIS — D73.5 INFARCTION OF SPLEEN: ICD-10-CM

## 2017-04-18 DIAGNOSIS — Q25.43 CONGENITAL ANEURYSM OF AORTA: ICD-10-CM

## 2017-04-18 DIAGNOSIS — F10.10 ALCOHOL ABUSE, UNCOMPLICATED: ICD-10-CM

## 2017-04-18 DIAGNOSIS — E78.5 HYPERLIPIDEMIA, UNSPECIFIED: ICD-10-CM

## 2017-04-18 DIAGNOSIS — I25.10 ATHEROSCLEROTIC HEART DISEASE OF NATIVE CORONARY ARTERY WITHOUT ANGINA PECTORIS: ICD-10-CM

## 2017-04-25 ENCOUNTER — APPOINTMENT (OUTPATIENT)
Dept: HEMATOLOGY ONCOLOGY | Facility: CLINIC | Age: 52
End: 2017-04-25

## 2017-04-25 VITALS
RESPIRATION RATE: 16 BRPM | BODY MASS INDEX: 30.88 KG/M2 | WEIGHT: 228 LBS | SYSTOLIC BLOOD PRESSURE: 112 MMHG | HEIGHT: 72 IN | DIASTOLIC BLOOD PRESSURE: 80 MMHG | OXYGEN SATURATION: 98 % | HEART RATE: 101 BPM | TEMPERATURE: 98 F

## 2017-05-16 ENCOUNTER — APPOINTMENT (OUTPATIENT)
Dept: INTERNAL MEDICINE | Facility: CLINIC | Age: 52
End: 2017-05-16

## 2017-05-24 ENCOUNTER — INPATIENT (INPATIENT)
Facility: HOSPITAL | Age: 52
LOS: 6 days | Discharge: HOME | DRG: 440 | End: 2017-05-31
Attending: SURGERY | Admitting: SURGERY
Payer: MEDICARE

## 2017-05-24 VITALS
HEART RATE: 87 BPM | RESPIRATION RATE: 18 BRPM | WEIGHT: 235.89 LBS | OXYGEN SATURATION: 97 % | DIASTOLIC BLOOD PRESSURE: 106 MMHG | TEMPERATURE: 98 F | SYSTOLIC BLOOD PRESSURE: 153 MMHG

## 2017-05-24 LAB
ALBUMIN SERPL ELPH-MCNC: 3.8 G/DL — SIGNIFICANT CHANGE UP (ref 3.3–5)
ALP SERPL-CCNC: 94 U/L — SIGNIFICANT CHANGE UP (ref 40–120)
ALT FLD-CCNC: 26 U/L — SIGNIFICANT CHANGE UP (ref 10–45)
ANION GAP SERPL CALC-SCNC: 17 MMOL/L — SIGNIFICANT CHANGE UP (ref 5–17)
APPEARANCE UR: CLEAR — SIGNIFICANT CHANGE UP
APTT BLD: 29.8 SEC — SIGNIFICANT CHANGE UP (ref 27.5–37.4)
APTT BLD: 30.2 SEC — SIGNIFICANT CHANGE UP (ref 27.5–37.4)
AST SERPL-CCNC: 25 U/L — SIGNIFICANT CHANGE UP (ref 10–40)
BASOPHILS NFR BLD AUTO: 0.5 % — SIGNIFICANT CHANGE UP (ref 0–2)
BILIRUB SERPL-MCNC: 0.7 MG/DL — SIGNIFICANT CHANGE UP (ref 0.2–1.2)
BILIRUB UR-MCNC: NEGATIVE — SIGNIFICANT CHANGE UP
BUN SERPL-MCNC: 10 MG/DL — SIGNIFICANT CHANGE UP (ref 7–23)
CALCIUM SERPL-MCNC: 9.4 MG/DL — SIGNIFICANT CHANGE UP (ref 8.4–10.5)
CHLORIDE SERPL-SCNC: 93 MMOL/L — LOW (ref 96–108)
CK MB CFR SERPL CALC: 1.4 NG/ML — SIGNIFICANT CHANGE UP (ref 0–6.7)
CK SERPL-CCNC: 86 U/L — SIGNIFICANT CHANGE UP (ref 30–200)
CO2 SERPL-SCNC: 21 MMOL/L — LOW (ref 22–31)
COLOR SPEC: YELLOW — SIGNIFICANT CHANGE UP
CREAT SERPL-MCNC: 0.7 MG/DL — SIGNIFICANT CHANGE UP (ref 0.5–1.3)
DIFF PNL FLD: NEGATIVE — SIGNIFICANT CHANGE UP
EOSINOPHIL NFR BLD AUTO: 2.8 % — SIGNIFICANT CHANGE UP (ref 0–6)
EXTRA SST TUBE: SIGNIFICANT CHANGE UP
GLUCOSE SERPL-MCNC: 189 MG/DL — HIGH (ref 70–99)
GLUCOSE UR QL: NEGATIVE — SIGNIFICANT CHANGE UP
HCT VFR BLD CALC: 40.1 % — SIGNIFICANT CHANGE UP (ref 39–50)
HGB BLD-MCNC: 14 G/DL — SIGNIFICANT CHANGE UP (ref 13–17)
INR BLD: 1.04 — SIGNIFICANT CHANGE UP (ref 0.88–1.16)
INR BLD: 1.05 — SIGNIFICANT CHANGE UP (ref 0.88–1.16)
KETONES UR-MCNC: NEGATIVE — SIGNIFICANT CHANGE UP
LEUKOCYTE ESTERASE UR-ACNC: NEGATIVE — SIGNIFICANT CHANGE UP
LIDOCAIN IGE QN: 118 U/L — HIGH (ref 7–60)
LYMPHOCYTES # BLD AUTO: 21.2 % — SIGNIFICANT CHANGE UP (ref 13–44)
MCHC RBC-ENTMCNC: 30 PG — SIGNIFICANT CHANGE UP (ref 27–34)
MCHC RBC-ENTMCNC: 34.9 G/DL — SIGNIFICANT CHANGE UP (ref 32–36)
MCV RBC AUTO: 86.1 FL — SIGNIFICANT CHANGE UP (ref 80–100)
MONOCYTES NFR BLD AUTO: 5.9 % — SIGNIFICANT CHANGE UP (ref 2–14)
NEUTROPHILS NFR BLD AUTO: 69.6 % — SIGNIFICANT CHANGE UP (ref 43–77)
NITRITE UR-MCNC: NEGATIVE — SIGNIFICANT CHANGE UP
PH UR: 6 — SIGNIFICANT CHANGE UP (ref 5–8)
PLATELET # BLD AUTO: 223 K/UL — SIGNIFICANT CHANGE UP (ref 150–400)
POTASSIUM SERPL-MCNC: 4.6 MMOL/L — SIGNIFICANT CHANGE UP (ref 3.5–5.3)
POTASSIUM SERPL-SCNC: 4.6 MMOL/L — SIGNIFICANT CHANGE UP (ref 3.5–5.3)
PROT SERPL-MCNC: 7.8 G/DL — SIGNIFICANT CHANGE UP (ref 6–8.3)
PROT UR-MCNC: NEGATIVE MG/DL — SIGNIFICANT CHANGE UP
PROTHROM AB SERPL-ACNC: 11.6 SEC — SIGNIFICANT CHANGE UP (ref 9.8–12.7)
PROTHROM AB SERPL-ACNC: 11.7 SEC — SIGNIFICANT CHANGE UP (ref 9.8–12.7)
RBC # BLD: 4.66 M/UL — SIGNIFICANT CHANGE UP (ref 4.2–5.8)
RBC # FLD: 13.8 % — SIGNIFICANT CHANGE UP (ref 10.3–16.9)
SODIUM SERPL-SCNC: 131 MMOL/L — LOW (ref 135–145)
SP GR SPEC: <=1.005 — SIGNIFICANT CHANGE UP (ref 1–1.03)
TROPONIN T SERPL-MCNC: <0.01 NG/ML — SIGNIFICANT CHANGE UP (ref 0–0.01)
TROPONIN T SERPL-MCNC: <0.01 NG/ML — SIGNIFICANT CHANGE UP (ref 0–0.01)
UROBILINOGEN FLD QL: 0.2 E.U./DL — SIGNIFICANT CHANGE UP
WBC # BLD: 12.9 K/UL — HIGH (ref 3.8–10.5)
WBC # FLD AUTO: 12.9 K/UL — HIGH (ref 3.8–10.5)

## 2017-05-24 PROCEDURE — 71010: CPT | Mod: 26

## 2017-05-24 PROCEDURE — 99285 EMERGENCY DEPT VISIT HI MDM: CPT | Mod: 25

## 2017-05-24 PROCEDURE — 93010 ELECTROCARDIOGRAM REPORT: CPT

## 2017-05-24 PROCEDURE — 74177 CT ABD & PELVIS W/CONTRAST: CPT | Mod: 26

## 2017-05-24 RX ORDER — HEPARIN SODIUM 5000 [USP'U]/ML
5000 INJECTION INTRAVENOUS; SUBCUTANEOUS EVERY 8 HOURS
Qty: 0 | Refills: 0 | Status: DISCONTINUED | OUTPATIENT
Start: 2017-05-24 | End: 2017-05-31

## 2017-05-24 RX ORDER — SODIUM CHLORIDE 9 MG/ML
1000 INJECTION INTRAMUSCULAR; INTRAVENOUS; SUBCUTANEOUS ONCE
Qty: 0 | Refills: 0 | Status: COMPLETED | OUTPATIENT
Start: 2017-05-24 | End: 2017-05-24

## 2017-05-24 RX ORDER — METOPROLOL TARTRATE 50 MG
5 TABLET ORAL EVERY 6 HOURS
Qty: 0 | Refills: 0 | Status: DISCONTINUED | OUTPATIENT
Start: 2017-05-24 | End: 2017-05-26

## 2017-05-24 RX ORDER — HYDROMORPHONE HYDROCHLORIDE 2 MG/ML
1 INJECTION INTRAMUSCULAR; INTRAVENOUS; SUBCUTANEOUS EVERY 4 HOURS
Qty: 0 | Refills: 0 | Status: DISCONTINUED | OUTPATIENT
Start: 2017-05-24 | End: 2017-05-26

## 2017-05-24 RX ORDER — IOHEXOL 300 MG/ML
50 INJECTION, SOLUTION INTRAVENOUS ONCE
Qty: 0 | Refills: 0 | Status: COMPLETED | OUTPATIENT
Start: 2017-05-24 | End: 2017-05-24

## 2017-05-24 RX ORDER — HYDROMORPHONE HYDROCHLORIDE 2 MG/ML
0.5 INJECTION INTRAMUSCULAR; INTRAVENOUS; SUBCUTANEOUS EVERY 4 HOURS
Qty: 0 | Refills: 0 | Status: DISCONTINUED | OUTPATIENT
Start: 2017-05-24 | End: 2017-05-26

## 2017-05-24 RX ORDER — INSULIN LISPRO 100/ML
VIAL (ML) SUBCUTANEOUS
Qty: 0 | Refills: 0 | Status: DISCONTINUED | OUTPATIENT
Start: 2017-05-24 | End: 2017-05-31

## 2017-05-24 RX ORDER — DEXTROSE 50 % IN WATER 50 %
25 SYRINGE (ML) INTRAVENOUS ONCE
Qty: 0 | Refills: 0 | Status: DISCONTINUED | OUTPATIENT
Start: 2017-05-24 | End: 2017-05-31

## 2017-05-24 RX ORDER — ONDANSETRON 8 MG/1
4 TABLET, FILM COATED ORAL EVERY 6 HOURS
Qty: 0 | Refills: 0 | Status: DISCONTINUED | OUTPATIENT
Start: 2017-05-24 | End: 2017-05-31

## 2017-05-24 RX ORDER — DEXTROSE 50 % IN WATER 50 %
12.5 SYRINGE (ML) INTRAVENOUS ONCE
Qty: 0 | Refills: 0 | Status: DISCONTINUED | OUTPATIENT
Start: 2017-05-24 | End: 2017-05-31

## 2017-05-24 RX ORDER — HYDROMORPHONE HYDROCHLORIDE 2 MG/ML
1 INJECTION INTRAMUSCULAR; INTRAVENOUS; SUBCUTANEOUS ONCE
Qty: 0 | Refills: 0 | Status: DISCONTINUED | OUTPATIENT
Start: 2017-05-24 | End: 2017-05-24

## 2017-05-24 RX ORDER — PANTOPRAZOLE SODIUM 20 MG/1
40 TABLET, DELAYED RELEASE ORAL DAILY
Qty: 0 | Refills: 0 | Status: DISCONTINUED | OUTPATIENT
Start: 2017-05-24 | End: 2017-05-26

## 2017-05-24 RX ORDER — SODIUM CHLORIDE 9 MG/ML
1000 INJECTION, SOLUTION INTRAVENOUS
Qty: 0 | Refills: 0 | Status: DISCONTINUED | OUTPATIENT
Start: 2017-05-24 | End: 2017-05-31

## 2017-05-24 RX ORDER — SODIUM CHLORIDE 9 MG/ML
1000 INJECTION, SOLUTION INTRAVENOUS
Qty: 0 | Refills: 0 | Status: DISCONTINUED | OUTPATIENT
Start: 2017-05-24 | End: 2017-05-26

## 2017-05-24 RX ORDER — GLUCAGON INJECTION, SOLUTION 0.5 MG/.1ML
1 INJECTION, SOLUTION SUBCUTANEOUS ONCE
Qty: 0 | Refills: 0 | Status: DISCONTINUED | OUTPATIENT
Start: 2017-05-24 | End: 2017-05-31

## 2017-05-24 RX ORDER — DEXTROSE 50 % IN WATER 50 %
1 SYRINGE (ML) INTRAVENOUS ONCE
Qty: 0 | Refills: 0 | Status: DISCONTINUED | OUTPATIENT
Start: 2017-05-24 | End: 2017-05-31

## 2017-05-24 RX ADMIN — HEPARIN SODIUM 5000 UNIT(S): 5000 INJECTION INTRAVENOUS; SUBCUTANEOUS at 22:01

## 2017-05-24 RX ADMIN — HYDROMORPHONE HYDROCHLORIDE 1 MILLIGRAM(S): 2 INJECTION INTRAMUSCULAR; INTRAVENOUS; SUBCUTANEOUS at 08:47

## 2017-05-24 RX ADMIN — SODIUM CHLORIDE 150 MILLILITER(S): 9 INJECTION, SOLUTION INTRAVENOUS at 16:21

## 2017-05-24 RX ADMIN — HYDROMORPHONE HYDROCHLORIDE 1 MILLIGRAM(S): 2 INJECTION INTRAMUSCULAR; INTRAVENOUS; SUBCUTANEOUS at 15:15

## 2017-05-24 RX ADMIN — HYDROMORPHONE HYDROCHLORIDE 0.5 MILLIGRAM(S): 2 INJECTION INTRAMUSCULAR; INTRAVENOUS; SUBCUTANEOUS at 22:40

## 2017-05-24 RX ADMIN — HYDROMORPHONE HYDROCHLORIDE 1 MILLIGRAM(S): 2 INJECTION INTRAMUSCULAR; INTRAVENOUS; SUBCUTANEOUS at 15:31

## 2017-05-24 RX ADMIN — HYDROMORPHONE HYDROCHLORIDE 1 MILLIGRAM(S): 2 INJECTION INTRAMUSCULAR; INTRAVENOUS; SUBCUTANEOUS at 19:27

## 2017-05-24 RX ADMIN — HEPARIN SODIUM 5000 UNIT(S): 5000 INJECTION INTRAVENOUS; SUBCUTANEOUS at 15:37

## 2017-05-24 RX ADMIN — HYDROMORPHONE HYDROCHLORIDE 1 MILLIGRAM(S): 2 INJECTION INTRAMUSCULAR; INTRAVENOUS; SUBCUTANEOUS at 12:29

## 2017-05-24 RX ADMIN — HYDROMORPHONE HYDROCHLORIDE 1 MILLIGRAM(S): 2 INJECTION INTRAMUSCULAR; INTRAVENOUS; SUBCUTANEOUS at 22:46

## 2017-05-24 RX ADMIN — IOHEXOL 50 MILLILITER(S): 300 INJECTION, SOLUTION INTRAVENOUS at 09:05

## 2017-05-24 RX ADMIN — SODIUM CHLORIDE 1000 MILLILITER(S): 9 INJECTION INTRAMUSCULAR; INTRAVENOUS; SUBCUTANEOUS at 12:28

## 2017-05-24 RX ADMIN — SODIUM CHLORIDE 150 MILLILITER(S): 9 INJECTION, SOLUTION INTRAVENOUS at 22:00

## 2017-05-24 RX ADMIN — SODIUM CHLORIDE 100 MILLILITER(S): 9 INJECTION, SOLUTION INTRAVENOUS at 15:30

## 2017-05-24 RX ADMIN — HYDROMORPHONE HYDROCHLORIDE 1 MILLIGRAM(S): 2 INJECTION INTRAMUSCULAR; INTRAVENOUS; SUBCUTANEOUS at 15:44

## 2017-05-24 RX ADMIN — Medication 5 MILLIGRAM(S): at 17:30

## 2017-05-24 RX ADMIN — SODIUM CHLORIDE 1000 MILLILITER(S): 9 INJECTION INTRAMUSCULAR; INTRAVENOUS; SUBCUTANEOUS at 08:46

## 2017-05-24 RX ADMIN — PANTOPRAZOLE SODIUM 40 MILLIGRAM(S): 20 TABLET, DELAYED RELEASE ORAL at 15:15

## 2017-05-24 RX ADMIN — Medication 5 MILLIGRAM(S): at 23:57

## 2017-05-24 NOTE — H&P ADULT - NSHPPHYSICALEXAM_GEN_ALL_CORE
Vital Signs Last 24 Hrs  T(C): 36.8, Max: 36.8 (05-24 @ 07:44)  T(F): 98.2, Max: 98.2 (05-24 @ 07:44)  HR: 86 (80 - 89)  BP: 168/100 (153/106 - 178/108)  BP(mean): --  RR: 16 (16 - 18)  SpO2: 99% (97% - 99%)    Physical Exam:   Gen: AOx3, pleasant in NAD  Cor: RRR, +S1S2, no MRG  Pulm: CTA b/l No W/R/S  Abd: +BS, Soft, non distended, TTP in LLQ and LUQ, no RG/T  Ext: WWP, non edematous, DP +2 b/l

## 2017-05-24 NOTE — ED PROVIDER NOTE - OBJECTIVE STATEMENT
50 yo male with hx of alcohol abuse, HTN, CAD, pancreatitis and splenic hematoma that required draining per IR a couple of months ago, presents with pressure-like LUQ abd pain that radiates to left chest and left shoulder blade X 3 days, worsening over the past 24 hours. (+) nausea with nbnb emesis. (+) loose nb BMs. No fevers/ chills. No urinary sxs. No gross hematuria.

## 2017-05-24 NOTE — H&P ADULT - HISTORY OF PRESENT ILLNESS
51M w/PMH of CAD, MI s/p angioplasty 10 years currently not on aspirin, HTN, and chronic pancreatitis 2/2 to EtOH abuse(last drink was in [place]), atraumatic splenic hematoma treated non operatively(NICOLE drain placed on 2/1/17 removed 2/2/17), segmental PE that has resolved presents to [facility] ED today with abdominal pain that began on Sunday after a fishing trip. Pain located in LLQ with radiation to RUQ, constant, stabbing, since the initiation of pain the patient has not been able to tolerate anything PO. The pain is worsened with deep inspirations. Admits to sweats, no fevers or chills. Denies cp/sob/palp

## 2017-05-24 NOTE — ED ADULT NURSE NOTE - OBJECTIVE STATEMENT
pt aaox3, c/o epigastric pain radiating to LLQ, pain is constant and sharp, 8/10. onset sunday. pt also c/o L sided chest pain that radiates to L back. pain is dull and constant, onset sunday. pt also c/o N/V/D. EKG completed, attached to continuous cardiac monitor. will continue to monitor pt aaox3, c/o epigastric pain radiating to LLQ, pain is constant and sharp, 8/10. onset sunday. pt also c/o L sided chest pain that radiates to L back. pain is dull and constant, onset sunday. pt also c/o N/V/D. pt states he has recently been hospitalized for ruptured spleen. EKG completed, attached to continuous cardiac monitor. will continue to monitor pt aaox3, c/o epigastric pain radiating to LLQ, pain is constant and sharp, 8/10. onset sunday. pt also c/o L sided chest pain that radiates to L back. pain is dull and constant, onset sunday. pt also c/o N/V/D. states stool is dark green. pt states he has recently been hospitalized for ruptured spleen. EKG completed, attached to continuous cardiac monitor. will continue to monitor

## 2017-05-24 NOTE — ED PROVIDER NOTE - PROGRESS NOTE DETAILS
post dilaudid. CT noted with interval increase in the splenic subcapsular fluid collection and splenic stranding. Surgery consulted and pt admitted to surgery.

## 2017-05-24 NOTE — ED ADULT TRIAGE NOTE - CHIEF COMPLAINT QUOTE
chest pain on and off since Sun,  reports cardiac hx,LUQ abd pain, N/V since sun, hx of spleen problem

## 2017-05-24 NOTE — H&P ADULT - NSHPLABSRESULTS_GEN_ALL_CORE
LABS:  CBC Full  -  ( 24 May 2017 08:54 )  WBC Count : 12.9 K/uL  Hemoglobin : 14.0 g/dL  Hematocrit : 40.1 %  Platelet Count - Automated : 223 K/uL  Mean Cell Volume : 86.1 fL  Mean Cell Hemoglobin : 30.0 pg  Mean Cell Hemoglobin Concentration : 34.9 g/dL  Auto Neutrophil # : x  Auto Lymphocyte # : x  Auto Monocyte # : x  Auto Eosinophil # : x  Auto Basophil # : x  Auto Neutrophil % : 69.6 %  Auto Lymphocyte % : 21.2 %  Auto Monocyte % : 5.9 %  Auto Eosinophil % : 2.8 %  Auto Basophil % : 0.5 %    05-24    131<L>  |  93<L>  |  10  ----------------------------<  189<H>  4.6   |  21<L>  |  0.70    Ca    9.4      24 May 2017 08:54    TPro  7.8  /  Alb  3.8  /  TBili  0.7  /  DBili  x   /  AST  25  /  ALT  26  /  AlkPhos  94  05-24    PT/INR - ( 24 May 2017 11:59 )   PT: 11.6 sec;   INR: 1.04        PTT - ( 24 May 2017 11:59 )  PTT:29.8 sec    Urinalysis Basic - ( 24 May 2017 11:49 )    Color: Yellow / Appearance: Clear / SG: <=1.005 / pH: x  Gluc: x / Ketone: NEGATIVE  / Bili: NEGATIVE / Urobili: 0.2 E.U./dL   Blood: x / Protein: NEGATIVE mg/dL / Nitrite: NEGATIVE   Leuk Esterase: NEGATIVE / RBC: x / WBC x   Sq Epi: x / Non Sq Epi: x / Bacteria: x    RADIOLOGY & ADDITIONAL STUDIES (The following images were personally reviewed):  FINDINGS: Images of the lower chest demonstrate a stable 3 mm right   middle lobe pulmonary nodule.    The liver is normal in appearance.  No radiopaque stones are seen in the   gallbladder.  2.3 x 2.1 x 1.7 cm low-density mass in the tail of the   pancreas. There is surrounding stranding around the tail of the pancreas.    Interval increase of the subcapsular cystic splenic lesion, currently   measuring 8.2 x 5.7 x 8.2 cm, up from 7.0 x 4.2 x 5.1 cm. The perisplenic   fluid collections and perisplenic stranding noted on the prior study have   resolved. Linear areas of low density within the spleen have improved.     Slightly thickened left adrenal gland. The kidneys are normal in   appearance.        No abdominal aortic aneurysm is seen. Moderate atherosclerotic   calcifications of the abdominal aorta, iliac and femoral arteries and at   some of their branches. Increased number of small mesenteric lymph nodes   surround the tail of the pancreas consistent with lymphadenopathy. Up to   1 cm in short axis retroperitoneal lymph nodes. Small right upper   quadrant lymph nodes measuring less than a centimeter in short axis. Evaluation of the bowel demonstrates no bowel obstruction or free air.   Normal appendix. Prominent submucosal fat deposition in the terminal   ileum, ascending colon, hepatic flexure and rectum, to a lesser extent   descending colon likely representing sequelae of chronic/repeated   inflammatory disease such as in cases of Crohn's disease. Colonic   diverticulosis. Small fat-containing umbilical hernia. Unchanged 1.1 cm   lipoma in the abdominal second portion of the duodenum. Bilateral   thickening of the third portion of the duodenum. No ascites is seen.    Images of the pelvis demonstrate the prostate and seminal vesicles to be   normal in appearance.   Vas deferens calcifications which may be seen   with diabetes. 2 9-mm in shortaxis bilateral external iliac lymph nodes.    Evaluation of the osseous structures demonstrates degenerative changes of   the spine and sacroiliac joints. Well-corticated calcific densities   adjacent to the acetabulum bilaterally.    IMPRESSION:  2.3 cm low-density lesion in the tail of the pancreas. There is   surrounding inflammatory change. Findings are concerning for a pancreatic   neoplasm, but the differential diagnosis includes focal pancreatitis. MRI   with pancreatic mass protocol is recommended. Increased size of the subcapsular splenic cystic lesion.

## 2017-05-24 NOTE — H&P ADULT - ASSESSMENT
51M pt with above history now with abdominal pain and PO intolerance, CT suggests possible pancreatitis, splenic hematoma likely liquefied now into cyst, decreased in size from prior CT     -Admit to Surgery, [facility]  -NPO  -IVF  -PRN pain meds  -Protonix  -SCDs only  -OOB ambulation  -Metoprolol IV for HTN

## 2017-05-24 NOTE — ED PROVIDER NOTE - MEDICAL DECISION MAKING DETAILS
Pt with hx of splenic hematoma s/p IR drainage presents with increasing LUQ abd pain and N/V for the past 3 days. CT revealed an increase in the subcapsular splenic hematoma and concurrent stranding. Surgery consulted and pt admitted to surgery.

## 2017-05-25 LAB
AMYLASE P1 CFR SERPL: 38 U/L — SIGNIFICANT CHANGE UP (ref 25–125)
ANION GAP SERPL CALC-SCNC: 10 MMOL/L — SIGNIFICANT CHANGE UP (ref 5–17)
BUN SERPL-MCNC: 8 MG/DL — SIGNIFICANT CHANGE UP (ref 7–23)
CALCIUM SERPL-MCNC: 9.4 MG/DL — SIGNIFICANT CHANGE UP (ref 8.4–10.5)
CHLORIDE SERPL-SCNC: 96 MMOL/L — SIGNIFICANT CHANGE UP (ref 96–108)
CO2 SERPL-SCNC: 25 MMOL/L — SIGNIFICANT CHANGE UP (ref 22–31)
CREAT SERPL-MCNC: 0.6 MG/DL — SIGNIFICANT CHANGE UP (ref 0.5–1.3)
CULTURE RESULTS: NO GROWTH — SIGNIFICANT CHANGE UP
GLUCOSE SERPL-MCNC: 140 MG/DL — HIGH (ref 70–99)
HBA1C BLD-MCNC: 6.2 % — HIGH (ref 4–5.6)
HCT VFR BLD CALC: 39.2 % — SIGNIFICANT CHANGE UP (ref 39–50)
HGB BLD-MCNC: 14.2 G/DL — SIGNIFICANT CHANGE UP (ref 13–17)
LIDOCAIN IGE QN: 91 U/L — HIGH (ref 7–60)
MAGNESIUM SERPL-MCNC: 1.8 MG/DL — SIGNIFICANT CHANGE UP (ref 1.6–2.6)
MCHC RBC-ENTMCNC: 31.2 PG — SIGNIFICANT CHANGE UP (ref 27–34)
MCHC RBC-ENTMCNC: 36.2 G/DL — HIGH (ref 32–36)
MCV RBC AUTO: 86.2 FL — SIGNIFICANT CHANGE UP (ref 80–100)
PHOSPHATE SERPL-MCNC: 3.4 MG/DL — SIGNIFICANT CHANGE UP (ref 2.5–4.5)
PLATELET # BLD AUTO: 186 K/UL — SIGNIFICANT CHANGE UP (ref 150–400)
POTASSIUM SERPL-MCNC: 3.8 MMOL/L — SIGNIFICANT CHANGE UP (ref 3.5–5.3)
POTASSIUM SERPL-SCNC: 3.8 MMOL/L — SIGNIFICANT CHANGE UP (ref 3.5–5.3)
RBC # BLD: 4.55 M/UL — SIGNIFICANT CHANGE UP (ref 4.2–5.8)
RBC # FLD: 13.7 % — SIGNIFICANT CHANGE UP (ref 10.3–16.9)
SODIUM SERPL-SCNC: 131 MMOL/L — LOW (ref 135–145)
SPECIMEN SOURCE: SIGNIFICANT CHANGE UP
WBC # BLD: 12.6 K/UL — HIGH (ref 3.8–10.5)
WBC # FLD AUTO: 12.6 K/UL — HIGH (ref 3.8–10.5)

## 2017-05-25 RX ADMIN — HEPARIN SODIUM 5000 UNIT(S): 5000 INJECTION INTRAVENOUS; SUBCUTANEOUS at 14:58

## 2017-05-25 RX ADMIN — HYDROMORPHONE HYDROCHLORIDE 1 MILLIGRAM(S): 2 INJECTION INTRAMUSCULAR; INTRAVENOUS; SUBCUTANEOUS at 21:54

## 2017-05-25 RX ADMIN — Medication 5 MILLIGRAM(S): at 05:52

## 2017-05-25 RX ADMIN — HEPARIN SODIUM 5000 UNIT(S): 5000 INJECTION INTRAVENOUS; SUBCUTANEOUS at 05:51

## 2017-05-25 RX ADMIN — SODIUM CHLORIDE 150 MILLILITER(S): 9 INJECTION, SOLUTION INTRAVENOUS at 04:18

## 2017-05-25 RX ADMIN — HEPARIN SODIUM 5000 UNIT(S): 5000 INJECTION INTRAVENOUS; SUBCUTANEOUS at 21:53

## 2017-05-25 RX ADMIN — HYDROMORPHONE HYDROCHLORIDE 1 MILLIGRAM(S): 2 INJECTION INTRAMUSCULAR; INTRAVENOUS; SUBCUTANEOUS at 08:51

## 2017-05-25 RX ADMIN — HYDROMORPHONE HYDROCHLORIDE 1 MILLIGRAM(S): 2 INJECTION INTRAMUSCULAR; INTRAVENOUS; SUBCUTANEOUS at 09:10

## 2017-05-25 RX ADMIN — HYDROMORPHONE HYDROCHLORIDE 1 MILLIGRAM(S): 2 INJECTION INTRAMUSCULAR; INTRAVENOUS; SUBCUTANEOUS at 17:22

## 2017-05-25 RX ADMIN — HYDROMORPHONE HYDROCHLORIDE 1 MILLIGRAM(S): 2 INJECTION INTRAMUSCULAR; INTRAVENOUS; SUBCUTANEOUS at 22:26

## 2017-05-25 RX ADMIN — Medication 5 MILLIGRAM(S): at 17:22

## 2017-05-25 RX ADMIN — SODIUM CHLORIDE 150 MILLILITER(S): 9 INJECTION, SOLUTION INTRAVENOUS at 14:59

## 2017-05-25 RX ADMIN — HYDROMORPHONE HYDROCHLORIDE 1 MILLIGRAM(S): 2 INJECTION INTRAMUSCULAR; INTRAVENOUS; SUBCUTANEOUS at 17:38

## 2017-05-25 RX ADMIN — HYDROMORPHONE HYDROCHLORIDE 1 MILLIGRAM(S): 2 INJECTION INTRAMUSCULAR; INTRAVENOUS; SUBCUTANEOUS at 01:30

## 2017-05-25 RX ADMIN — Medication 5 MILLIGRAM(S): at 12:33

## 2017-05-25 RX ADMIN — PANTOPRAZOLE SODIUM 40 MILLIGRAM(S): 20 TABLET, DELAYED RELEASE ORAL at 12:32

## 2017-05-25 RX ADMIN — Medication 2: at 12:32

## 2017-05-25 RX ADMIN — SODIUM CHLORIDE 150 MILLILITER(S): 9 INJECTION, SOLUTION INTRAVENOUS at 05:12

## 2017-05-25 RX ADMIN — SODIUM CHLORIDE 150 MILLILITER(S): 9 INJECTION, SOLUTION INTRAVENOUS at 05:58

## 2017-05-25 RX ADMIN — HYDROMORPHONE HYDROCHLORIDE 0.5 MILLIGRAM(S): 2 INJECTION INTRAMUSCULAR; INTRAVENOUS; SUBCUTANEOUS at 14:28

## 2017-05-25 RX ADMIN — HYDROMORPHONE HYDROCHLORIDE 0.5 MILLIGRAM(S): 2 INJECTION INTRAMUSCULAR; INTRAVENOUS; SUBCUTANEOUS at 05:52

## 2017-05-25 RX ADMIN — HYDROMORPHONE HYDROCHLORIDE 1 MILLIGRAM(S): 2 INJECTION INTRAMUSCULAR; INTRAVENOUS; SUBCUTANEOUS at 03:59

## 2017-05-25 RX ADMIN — HYDROMORPHONE HYDROCHLORIDE 0.5 MILLIGRAM(S): 2 INJECTION INTRAMUSCULAR; INTRAVENOUS; SUBCUTANEOUS at 12:42

## 2017-05-25 NOTE — PROGRESS NOTE ADULT - ASSESSMENT
50 y/o M with chronic pancreatitis CAD, MI s/p PCI here w/ abdominal pain  -NPO  -IVF@150  -pain/nausea control  -Am labs  -SQH/SCDS  -ISS  -metoprolol  -IS 52 y/o M with chronic pancreatitis CAD, MI s/p PCI here w/ abdominal pain  -NPO  -IVF@150  -pain/nausea control  -Am labs  -SQH/SCDS  -ISS  -metoprolol IV  -IS  - going to tele

## 2017-05-25 NOTE — PROGRESS NOTE ADULT - SUBJECTIVE AND OBJECTIVE BOX
O/N: 51M w/ chronic pancreatitis admitted with abd pain, CT shows pacreatitis vs neoplasm and inc size of subcapsular splenic cystic lesion O/N: 51M w/ chronic pancreatitis admitted with abd pain, CT shows pancreatitis vs neoplasm and inc size of subcapsular splenic cystic lesion      SUBJECTIVE:  Flatus: [ ] YES [ ] NO             Bowel Movement: [ ] YES [ ] NO  Pain (0-10):            Pain Control Adequate: [x ] YES [ ] NO  Nausea: [ ] YES [x ] NO            Vomiting: [ ] YES [x ] NO  Diarrhea: [ ] YES [ ] NO         Constipation: [ ] YES [ ] NO     Chest Pain: [ ] YES [x ] NO    SOB:  [ ] YES [ x] NO    MEDICATIONS  (STANDING):  heparin  Injectable 5000Unit(s) SubCutaneous every 8 hours  lactated ringers. 1000milliLiter(s) IV Continuous <Continuous>  pantoprazole  Injectable 40milliGRAM(s) IV Push daily  metoprolol Injectable 5milliGRAM(s) IV Push every 6 hours  insulin lispro (HumaLOG) corrective regimen sliding scale  SubCutaneous Before meals and at bedtime  dextrose 5%. 1000milliLiter(s) IV Continuous <Continuous>  dextrose 50% Injectable 12.5Gram(s) IV Push once  dextrose 50% Injectable 25Gram(s) IV Push once  dextrose 50% Injectable 25Gram(s) IV Push once    MEDICATIONS  (PRN):  HYDROmorphone  Injectable 0.5milliGRAM(s) IV Push every 4 hours PRN Moderate Pain  HYDROmorphone  Injectable 1milliGRAM(s) IV Push every 4 hours PRN Severe Pain  ondansetron Injectable 4milliGRAM(s) IV Push every 6 hours PRN Nausea  dextrose Gel 1Dose(s) Oral once PRN Blood Glucose LESS THAN 70 milliGRAM(s)/deciliter  glucagon  Injectable 1milliGRAM(s) IntraMuscular once PRN Glucose LESS THAN 70 milligrams/deciliter      Vital Signs Last 24 Hrs  T(C): 36.8, Max: 36.8 (05-24 @ 07:44)  T(F): 98.2, Max: 98.2 (05-24 @ 07:44)  HR: 84 (72 - 89)  BP: 163/102 (139/88 - 184/99)  BP(mean): --  RR: 18 (16 - 19)  SpO2: 97% (96% - 99%)    Physical Exam:  General: NAD  Pulmonary: Nonlabored breathing, no respiratory distress  Cardiovascular: NSR  Abdominal: soft, ND, minimal TTP LUQ, no organomegaly  Extremities: WWP, normal strength, no clubbing/cyanosis/edema  Neuro: A/O x3, no focal deficits, normal sensation  Pulses: palpable distal pulses    Lines/drains/tubes:    I&O's Summary      LABS:                        14.0   12.9  )-----------( 223      ( 24 May 2017 08:54 )             40.1     05-24    131<L>  |  93<L>  |  10  ----------------------------<  189<H>  4.6   |  21<L>  |  0.70    Ca    9.4      24 May 2017 08:54    TPro  7.8  /  Alb  3.8  /  TBili  0.7  /  DBili  x   /  AST  25  /  ALT  26  /  AlkPhos  94  05-24    PT/INR - ( 24 May 2017 13:36 )   PT: 11.7 sec;   INR: 1.05          PTT - ( 24 May 2017 13:36 )  PTT:30.2 sec  Urinalysis Basic - ( 24 May 2017 11:49 )    Color: Yellow / Appearance: Clear / SG: <=1.005 / pH: x  Gluc: x / Ketone: NEGATIVE  / Bili: NEGATIVE / Urobili: 0.2 E.U./dL   Blood: x / Protein: NEGATIVE mg/dL / Nitrite: NEGATIVE   Leuk Esterase: NEGATIVE / RBC: x / WBC x   Sq Epi: x / Non Sq Epi: x / Bacteria: x      CAPILLARY BLOOD GLUCOSE  116 (25 May 2017 06:15)  108 (24 May 2017 16:33)  108 (24 May 2017 16:01)    LIVER FUNCTIONS - ( 24 May 2017 08:54 )  Alb: 3.8 g/dL / Pro: 7.8 g/dL / ALK PHOS: 94 U/L / ALT: 26 U/L / AST: 25 U/L / GGT: x             RADIOLOGY & ADDITIONAL STUDIES:

## 2017-05-25 NOTE — PROGRESS NOTE ADULT - SUBJECTIVE AND OBJECTIVE BOX
INTERVAL HPI/OVERNIGHT EVENTS:    SURGERY ATTENDING FOR DR. LYNN    : 51M w/PMH of CAD, MI s/p angioplasty 10 years currently not on aspirin, HTN, and chronic pancreatitis 2/2 to EtOH abuse(last drink was in [place]), atraumatic splenic hematoma treated non operatively(NICOLE drain placed on 2/1/17 removed 2/2/17), segmental PE that has resolved presents to [facility] ED today with abdominal pain that began on Sunday after a fishing trip. Pain located in LLQ with radiation to RUQ, constant, stabbing, since the initiation of pain the patient has not been able to tolerate anything PO. The pain is worsened with deep inspirations. Admits to sweats, no fevers or chills. Denies cp/sob/palp      SUBJECTIVE:  Flatus: [ ] YES [X] NO             Bowel Movement: [ ] YES [X ] NO  Pain (0-10):     3       Pain Control Adequate: [X ] YES [ ] NO  Nausea: [ ] YES [X ] NO            Vomiting: [ ] YES [X ] NO  Diarrhea: [ ] YES [X ] NO         Constipation: [ ] YES [X ] NO     Chest Pain: [ ] YES [X ] NO    SOB:  [ ] YES [X ] NO    MEDICATIONS  (STANDING):  heparin  Injectable 5000Unit(s) SubCutaneous every 8 hours  lactated ringers. 1000milliLiter(s) IV Continuous <Continuous>  pantoprazole  Injectable 40milliGRAM(s) IV Push daily  metoprolol Injectable 5milliGRAM(s) IV Push every 6 hours  insulin lispro (HumaLOG) corrective regimen sliding scale  SubCutaneous Before meals and at bedtime  dextrose 5%. 1000milliLiter(s) IV Continuous <Continuous>  dextrose 50% Injectable 12.5Gram(s) IV Push once  dextrose 50% Injectable 25Gram(s) IV Push once  dextrose 50% Injectable 25Gram(s) IV Push once    MEDICATIONS  (PRN):  HYDROmorphone  Injectable 0.5milliGRAM(s) IV Push every 4 hours PRN Moderate Pain  HYDROmorphone  Injectable 1milliGRAM(s) IV Push every 4 hours PRN Severe Pain  ondansetron Injectable 4milliGRAM(s) IV Push every 6 hours PRN Nausea  dextrose Gel 1Dose(s) Oral once PRN Blood Glucose LESS THAN 70 milliGRAM(s)/deciliter  glucagon  Injectable 1milliGRAM(s) IntraMuscular once PRN Glucose LESS THAN 70 milligrams/deciliter  enalaprilat Injectable 0.65milliGRAM(s) IV Push every 6 hours PRN HTN      Vital Signs Last 24 Hrs  T(C): 36.2, Max: 36.8 (05-25 @ 05:26)  T(F): 97.1, Max: 98.2 (05-25 @ 05:26)  HR: 70 (70 - 86)  BP: 144/95 (144/95 - 181/105)  BP(mean): --  RR: 18 (18 - 18)  SpO2: 100% (96% - 100%)    PHYSICAL EXAM:      Constitutional:    Eyes:    ENMT:    Neck:    Breasts:    Back:    Respiratory:    Cardiovascular:    Gastrointestinal:    Genitourinary:    Rectal:    Extremities:    Vascular:    Neurological:    Skin:    Lymph Nodes:    Musculoskeletal:    Psychiatric:        I&O's Detail      LABS:                        14.2   12.6  )-----------( 186      ( 25 May 2017 10:41 )             39.2     05-25    131<L>  |  96  |  8   ----------------------------<  140<H>  3.8   |  25  |  0.60    Ca    9.4      25 May 2017 10:41  Phos  3.4     05-25  Mg     1.8     05-25    TPro  7.8  /  Alb  3.8  /  TBili  0.7  /  DBili  x   /  AST  25  /  ALT  26  /  AlkPhos  94  05-24    PT/INR - ( 24 May 2017 13:36 )   PT: 11.7 sec;   INR: 1.05          PTT - ( 24 May 2017 13:36 )  PTT:30.2 sec  Urinalysis Basic - ( 24 May 2017 11:49 )    Color: Yellow / Appearance: Clear / SG: <=1.005 / pH: x  Gluc: x / Ketone: NEGATIVE  / Bili: NEGATIVE / Urobili: 0.2 E.U./dL   Blood: x / Protein: NEGATIVE mg/dL / Nitrite: NEGATIVE   Leuk Esterase: NEGATIVE / RBC: x / WBC x   Sq Epi: x / Non Sq Epi: x / Bacteria: x        RADIOLOGY & ADDITIONAL STUDIES:

## 2017-05-26 DIAGNOSIS — R10.9 UNSPECIFIED ABDOMINAL PAIN: ICD-10-CM

## 2017-05-26 DIAGNOSIS — I10 ESSENTIAL (PRIMARY) HYPERTENSION: ICD-10-CM

## 2017-05-26 DIAGNOSIS — Z72.0 TOBACCO USE: ICD-10-CM

## 2017-05-26 DIAGNOSIS — I25.10 ATHEROSCLEROTIC HEART DISEASE OF NATIVE CORONARY ARTERY WITHOUT ANGINA PECTORIS: ICD-10-CM

## 2017-05-26 LAB
ALBUMIN SERPL ELPH-MCNC: 3.9 G/DL — SIGNIFICANT CHANGE UP (ref 3.3–5)
ALP SERPL-CCNC: 91 U/L — SIGNIFICANT CHANGE UP (ref 40–120)
ALT FLD-CCNC: 18 U/L — SIGNIFICANT CHANGE UP (ref 10–45)
ANION GAP SERPL CALC-SCNC: 13 MMOL/L — SIGNIFICANT CHANGE UP (ref 5–17)
AST SERPL-CCNC: 13 U/L — SIGNIFICANT CHANGE UP (ref 10–40)
BILIRUB SERPL-MCNC: 0.9 MG/DL — SIGNIFICANT CHANGE UP (ref 0.2–1.2)
BUN SERPL-MCNC: 8 MG/DL — SIGNIFICANT CHANGE UP (ref 7–23)
CALCIUM SERPL-MCNC: 9.7 MG/DL — SIGNIFICANT CHANGE UP (ref 8.4–10.5)
CHLORIDE SERPL-SCNC: 96 MMOL/L — SIGNIFICANT CHANGE UP (ref 96–108)
CO2 SERPL-SCNC: 27 MMOL/L — SIGNIFICANT CHANGE UP (ref 22–31)
CREAT SERPL-MCNC: 0.6 MG/DL — SIGNIFICANT CHANGE UP (ref 0.5–1.3)
GLUCOSE SERPL-MCNC: 102 MG/DL — HIGH (ref 70–99)
HCT VFR BLD CALC: 39.1 % — SIGNIFICANT CHANGE UP (ref 39–50)
HGB BLD-MCNC: 13.6 G/DL — SIGNIFICANT CHANGE UP (ref 13–17)
LIDOCAIN IGE QN: 107 U/L — HIGH (ref 7–60)
MAGNESIUM SERPL-MCNC: 2 MG/DL — SIGNIFICANT CHANGE UP (ref 1.6–2.6)
MCHC RBC-ENTMCNC: 30.1 PG — SIGNIFICANT CHANGE UP (ref 27–34)
MCHC RBC-ENTMCNC: 34.8 G/DL — SIGNIFICANT CHANGE UP (ref 32–36)
MCV RBC AUTO: 86.5 FL — SIGNIFICANT CHANGE UP (ref 80–100)
PHOSPHATE SERPL-MCNC: 3.6 MG/DL — SIGNIFICANT CHANGE UP (ref 2.5–4.5)
PLATELET # BLD AUTO: 193 K/UL — SIGNIFICANT CHANGE UP (ref 150–400)
POTASSIUM SERPL-MCNC: 4.3 MMOL/L — SIGNIFICANT CHANGE UP (ref 3.5–5.3)
POTASSIUM SERPL-SCNC: 4.3 MMOL/L — SIGNIFICANT CHANGE UP (ref 3.5–5.3)
PROT SERPL-MCNC: 7.7 G/DL — SIGNIFICANT CHANGE UP (ref 6–8.3)
RBC # BLD: 4.52 M/UL — SIGNIFICANT CHANGE UP (ref 4.2–5.8)
RBC # FLD: 13.8 % — SIGNIFICANT CHANGE UP (ref 10.3–16.9)
SODIUM SERPL-SCNC: 136 MMOL/L — SIGNIFICANT CHANGE UP (ref 135–145)
WBC # BLD: 9.8 K/UL — SIGNIFICANT CHANGE UP (ref 3.8–10.5)
WBC # FLD AUTO: 9.8 K/UL — SIGNIFICANT CHANGE UP (ref 3.8–10.5)

## 2017-05-26 RX ORDER — LISINOPRIL 2.5 MG/1
20 TABLET ORAL DAILY
Qty: 0 | Refills: 0 | Status: DISCONTINUED | OUTPATIENT
Start: 2017-05-26 | End: 2017-05-31

## 2017-05-26 RX ORDER — LABETALOL HCL 100 MG
10 TABLET ORAL ONCE
Qty: 0 | Refills: 0 | Status: COMPLETED | OUTPATIENT
Start: 2017-05-26 | End: 2017-05-26

## 2017-05-26 RX ORDER — ACETAMINOPHEN 500 MG
650 TABLET ORAL EVERY 6 HOURS
Qty: 0 | Refills: 0 | Status: DISCONTINUED | OUTPATIENT
Start: 2017-05-26 | End: 2017-05-26

## 2017-05-26 RX ORDER — ATORVASTATIN CALCIUM 80 MG/1
40 TABLET, FILM COATED ORAL AT BEDTIME
Qty: 0 | Refills: 0 | Status: DISCONTINUED | OUTPATIENT
Start: 2017-05-26 | End: 2017-05-31

## 2017-05-26 RX ORDER — SODIUM CHLORIDE 9 MG/ML
1000 INJECTION, SOLUTION INTRAVENOUS
Qty: 0 | Refills: 0 | Status: DISCONTINUED | OUTPATIENT
Start: 2017-05-26 | End: 2017-05-31

## 2017-05-26 RX ORDER — HYDROMORPHONE HYDROCHLORIDE 2 MG/ML
0.5 INJECTION INTRAMUSCULAR; INTRAVENOUS; SUBCUTANEOUS EVERY 4 HOURS
Qty: 0 | Refills: 0 | Status: DISCONTINUED | OUTPATIENT
Start: 2017-05-26 | End: 2017-05-27

## 2017-05-26 RX ORDER — DILTIAZEM HCL 120 MG
120 CAPSULE, EXT RELEASE 24 HR ORAL DAILY
Qty: 0 | Refills: 0 | Status: DISCONTINUED | OUTPATIENT
Start: 2017-05-26 | End: 2017-05-31

## 2017-05-26 RX ORDER — HYDROMORPHONE HYDROCHLORIDE 2 MG/ML
1 INJECTION INTRAMUSCULAR; INTRAVENOUS; SUBCUTANEOUS EVERY 4 HOURS
Qty: 0 | Refills: 0 | Status: DISCONTINUED | OUTPATIENT
Start: 2017-05-26 | End: 2017-05-27

## 2017-05-26 RX ORDER — PANTOPRAZOLE SODIUM 20 MG/1
40 TABLET, DELAYED RELEASE ORAL
Qty: 0 | Refills: 0 | Status: DISCONTINUED | OUTPATIENT
Start: 2017-05-26 | End: 2017-05-31

## 2017-05-26 RX ADMIN — HYDROMORPHONE HYDROCHLORIDE 1 MILLIGRAM(S): 2 INJECTION INTRAMUSCULAR; INTRAVENOUS; SUBCUTANEOUS at 22:39

## 2017-05-26 RX ADMIN — HEPARIN SODIUM 5000 UNIT(S): 5000 INJECTION INTRAVENOUS; SUBCUTANEOUS at 13:24

## 2017-05-26 RX ADMIN — Medication 5 MILLIGRAM(S): at 00:54

## 2017-05-26 RX ADMIN — ONDANSETRON 4 MILLIGRAM(S): 8 TABLET, FILM COATED ORAL at 17:48

## 2017-05-26 RX ADMIN — PANTOPRAZOLE SODIUM 40 MILLIGRAM(S): 20 TABLET, DELAYED RELEASE ORAL at 07:44

## 2017-05-26 RX ADMIN — Medication 120 MILLIGRAM(S): at 07:44

## 2017-05-26 RX ADMIN — Medication 2: at 11:20

## 2017-05-26 RX ADMIN — HYDROMORPHONE HYDROCHLORIDE 1 MILLIGRAM(S): 2 INJECTION INTRAMUSCULAR; INTRAVENOUS; SUBCUTANEOUS at 02:11

## 2017-05-26 RX ADMIN — HYDROMORPHONE HYDROCHLORIDE 1 MILLIGRAM(S): 2 INJECTION INTRAMUSCULAR; INTRAVENOUS; SUBCUTANEOUS at 02:25

## 2017-05-26 RX ADMIN — HEPARIN SODIUM 5000 UNIT(S): 5000 INJECTION INTRAVENOUS; SUBCUTANEOUS at 22:39

## 2017-05-26 RX ADMIN — SODIUM CHLORIDE 150 MILLILITER(S): 9 INJECTION, SOLUTION INTRAVENOUS at 11:20

## 2017-05-26 RX ADMIN — ATORVASTATIN CALCIUM 40 MILLIGRAM(S): 80 TABLET, FILM COATED ORAL at 22:39

## 2017-05-26 RX ADMIN — HYDROMORPHONE HYDROCHLORIDE 1 MILLIGRAM(S): 2 INJECTION INTRAMUSCULAR; INTRAVENOUS; SUBCUTANEOUS at 23:09

## 2017-05-26 RX ADMIN — SODIUM CHLORIDE 150 MILLILITER(S): 9 INJECTION, SOLUTION INTRAVENOUS at 17:48

## 2017-05-26 RX ADMIN — HYDROMORPHONE HYDROCHLORIDE 1 MILLIGRAM(S): 2 INJECTION INTRAMUSCULAR; INTRAVENOUS; SUBCUTANEOUS at 06:17

## 2017-05-26 RX ADMIN — HEPARIN SODIUM 5000 UNIT(S): 5000 INJECTION INTRAVENOUS; SUBCUTANEOUS at 06:17

## 2017-05-26 RX ADMIN — HYDROMORPHONE HYDROCHLORIDE 1 MILLIGRAM(S): 2 INJECTION INTRAMUSCULAR; INTRAVENOUS; SUBCUTANEOUS at 06:47

## 2017-05-26 RX ADMIN — LISINOPRIL 20 MILLIGRAM(S): 2.5 TABLET ORAL at 07:44

## 2017-05-26 NOTE — PROGRESS NOTE ADULT - SUBJECTIVE AND OBJECTIVE BOX
O/N: BP to 171/110 before metoprolol dose due, down to 160s after, VS otherwise stable  5/25: pending regional bed. Dr. Thompson consulted for preop clearance. O/N: BP to 171/110 before metoprolol dose due, down to 160s after, VS otherwise stable  5/25: pending regional bed. Dr. Morrow consulted for preop clearance.      SUBJECTIVE:  Flatus: [ ] YES [ ] NO             Bowel Movement: [ ] YES [ ] NO  Pain (0-10):            Pain Control Adequate: [x ] YES [ ] NO  Nausea: [ ] YES [x ] NO            Vomiting: [ ] YES [ x] NO  Diarrhea: [ ] YES [ ] NO         Constipation: [ ] YES [ ] NO     Chest Pain: [ ] YES [ x] NO    SOB:  [ ] YES [ x] NO    MEDICATIONS  (STANDING):  heparin  Injectable 5000Unit(s) SubCutaneous every 8 hours  lactated ringers. 1000milliLiter(s) IV Continuous <Continuous>  insulin lispro (HumaLOG) corrective regimen sliding scale  SubCutaneous Before meals and at bedtime  dextrose 5%. 1000milliLiter(s) IV Continuous <Continuous>  dextrose 50% Injectable 12.5Gram(s) IV Push once  dextrose 50% Injectable 25Gram(s) IV Push once  dextrose 50% Injectable 25Gram(s) IV Push once  diltiazem   CD 120milliGRAM(s) Oral daily  lisinopril 20milliGRAM(s) Oral daily  pantoprazole    Tablet 40milliGRAM(s) Oral before breakfast    MEDICATIONS  (PRN):  ondansetron Injectable 4milliGRAM(s) IV Push every 6 hours PRN Nausea  dextrose Gel 1Dose(s) Oral once PRN Blood Glucose LESS THAN 70 milliGRAM(s)/deciliter  glucagon  Injectable 1milliGRAM(s) IntraMuscular once PRN Glucose LESS THAN 70 milligrams/deciliter  oxyCODONE  5 mG/acetaminophen 325 mG 1Tablet(s) Oral every 4 hours PRN Severe Pain (7 - 10)  acetaminophen   Tablet. 650milliGRAM(s) Oral every 6 hours PRN Moderate Pain (4 - 6)      Vital Signs Last 24 Hrs  T(C): 36.7, Max: 36.9 (05-25 @ 20:16)  T(F): 98.1, Max: 98.4 (05-25 @ 20:16)  HR: 83 (70 - 87)  BP: 142/91 (142/91 - 181/105)  BP(mean): --  RR: 17 (16 - 18)  SpO2: 95% (95% - 100%)    Physical Exam:  General: NAD  Pulmonary: Nonlabored breathing, no respiratory distress  Cardiovascular: NSR  Abdominal: soft, NT/ND, no organomegaly  Extremities: WWP, normal strength, no clubbing/cyanosis/edema  Neuro: A/O x3, no focal deficits, normal sensation  Pulses: palpable distal pulses    Lines/drains/tubes:    I&O's Summary    I & Os for current day (as of 26 May 2017 06:51)  =============================================  IN: 1800 ml / OUT: 0 ml / NET: 1800 ml      LABS:                        13.6   9.8   )-----------( 193      ( 26 May 2017 06:12 )             39.1     05-25    131<L>  |  96  |  8   ----------------------------<  140<H>  3.8   |  25  |  0.60    Ca    9.4      25 May 2017 10:41  Phos  3.4     05-25  Mg     1.8     05-25    TPro  7.8  /  Alb  3.8  /  TBili  0.7  /  DBili  x   /  AST  25  /  ALT  26  /  AlkPhos  94  05-24    PT/INR - ( 24 May 2017 13:36 )   PT: 11.7 sec;   INR: 1.05          PTT - ( 24 May 2017 13:36 )  PTT:30.2 sec  Urinalysis Basic - ( 24 May 2017 11:49 )    Color: Yellow / Appearance: Clear / SG: <=1.005 / pH: x  Gluc: x / Ketone: NEGATIVE  / Bili: NEGATIVE / Urobili: 0.2 E.U./dL   Blood: x / Protein: NEGATIVE mg/dL / Nitrite: NEGATIVE   Leuk Esterase: NEGATIVE / RBC: x / WBC x   Sq Epi: x / Non Sq Epi: x / Bacteria: x      CAPILLARY BLOOD GLUCOSE  105 (25 May 2017 16:00)  156 (25 May 2017 12:10)    LIVER FUNCTIONS - ( 24 May 2017 08:54 )  Alb: 3.8 g/dL / Pro: 7.8 g/dL / ALK PHOS: 94 U/L / ALT: 26 U/L / AST: 25 U/L / GGT: x             RADIOLOGY & ADDITIONAL STUDIES:

## 2017-05-26 NOTE — CONSULT NOTE ADULT - PROBLEM SELECTOR RECOMMENDATION 3
Pancreatic mass, for surgery next week.  Pt is cleared to proceed.  He is a moderate risk pt going for a moderate risk procedure.

## 2017-05-26 NOTE — DIETITIAN INITIAL EVALUATION ADULT. - NS AS NUTRI INTERV ED CONTENT
Purpose of the nutrition education/Educated him on pancreatitis diet management (low fat, no ETOH), and diet post pancreatectomy.

## 2017-05-26 NOTE — DIETITIAN INITIAL EVALUATION ADULT. - OTHER INFO
Pt admitted with pancreatitis; pending distal pancreatectomy/splenectomy. Pt reports he stopped drinking alcohol to manage pancreatitis, yet admits to noncompliance when it comes to fatty foods.  Reports he ate a fried pork chip prior to onset of symptoms. Denies any n/v/d/c. Pain controlled with pain meds. Reports ~8 lb weight loss since earlier this year due to frequent admissions with pancreatitis.  Educated him on pancreatitis diet management (low fat, no ETOH), and diet post pancreatectomy.

## 2017-05-26 NOTE — CONSULT NOTE ADULT - CONSULT REASON
Pre op clearance in a pt with extensive vascular dz(remote stent, aortic aneurysm and aortic plaque), currently smokes.

## 2017-05-26 NOTE — DIETITIAN INITIAL EVALUATION ADULT. - ENERGY NEEDS
Height: 73" Weight: 231lbs, IBW 184lbs+/-10%, %%, BMI - 30   IBW used to calculate energy needs due to pt's current body weight exceeding 120% of IBW   Nutrient needs based on [facility] standards of care for maintenance in adults.

## 2017-05-26 NOTE — PROGRESS NOTE ADULT - SUBJECTIVE AND OBJECTIVE BOX
INTERVAL HPI/OVERNIGHT EVENTS:    SURGERY ATTENDING FOR DR. LYNN      SUBJECTIVE:  Flatus: [X ] YES [ ] NO             Bowel Movement: [X ] YES [ ] NO  Pain (0-10):       3     Pain Control Adequate: [X ] YES [ ] NO  Nausea: [ ] YES [X ] NO            Vomiting: [ ] YES [X ] NO  Diarrhea: [ ] YES [X ] NO         Constipation: [ ] YES [X ] NO     Chest Pain: [ ] YES [X ] NO    SOB:  [ ] YES [X ] NO    MEDICATIONS  (STANDING):  heparin  Injectable 5000Unit(s) SubCutaneous every 8 hours  insulin lispro (HumaLOG) corrective regimen sliding scale  SubCutaneous Before meals and at bedtime  dextrose 5%. 1000milliLiter(s) IV Continuous <Continuous>  dextrose 50% Injectable 12.5Gram(s) IV Push once  dextrose 50% Injectable 25Gram(s) IV Push once  dextrose 50% Injectable 25Gram(s) IV Push once  diltiazem   CD 120milliGRAM(s) Oral daily  lisinopril 20milliGRAM(s) Oral daily  pantoprazole    Tablet 40milliGRAM(s) Oral before breakfast  atorvastatin 40milliGRAM(s) Oral at bedtime    MEDICATIONS  (PRN):  ondansetron Injectable 4milliGRAM(s) IV Push every 6 hours PRN Nausea  dextrose Gel 1Dose(s) Oral once PRN Blood Glucose LESS THAN 70 milliGRAM(s)/deciliter  glucagon  Injectable 1milliGRAM(s) IntraMuscular once PRN Glucose LESS THAN 70 milligrams/deciliter  oxyCODONE  5 mG/acetaminophen 325 mG 1Tablet(s) Oral every 4 hours PRN Moderate Pain (4 - 6)  oxyCODONE  5 mG/acetaminophen 325 mG 2Tablet(s) Oral every 4 hours PRN Severe Pain (7 - 10)      Vital Signs Last 24 Hrs  T(C): 36.4, Max: 36.7 (05-26 @ 05:19)  T(F): 97.5, Max: 98.1 (05-26 @ 05:19)  HR: 81 (81 - 96)  BP: 198/91 (137/89 - 198/91)  BP(mean): --  RR: 16 (16 - 17)  SpO2: 100% (95% - 100%)    PHYSICAL EXAM:      Constitutional:    Eyes:    ENMT:    Neck:    Breasts:    Back:    Respiratory:    Cardiovascular:    Gastrointestinal:    Genitourinary:    Rectal:    Extremities:    Vascular:    Neurological:    Skin:    Lymph Nodes:    Musculoskeletal:    Psychiatric:        I&O's Detail  I & Os for 24h ending 26 May 2017 07:00  =============================================  IN:    lactated ringers.: 1800 ml    Total IN: 1800 ml  ---------------------------------------------  OUT:    Total OUT: 0 ml  ---------------------------------------------  Total NET: 1800 ml    I & Os for current day (as of 26 May 2017 20:37)  =============================================  IN:    Oral Fluid: 580 ml    Total IN: 580 ml  ---------------------------------------------  OUT:    Voided: 900 ml    Total OUT: 900 ml  ---------------------------------------------  Total NET: -320 ml      LABS:                        13.6   9.8   )-----------( 193      ( 26 May 2017 06:12 )             39.1     05-26    136  |  96  |  8   ----------------------------<  102<H>  4.3   |  27  |  0.60    Ca    9.7      26 May 2017 06:12  Phos  3.6     05-26  Mg     2.0     05-26    TPro  7.7  /  Alb  3.9  /  TBili  0.9  /  DBili  x   /  AST  13  /  ALT  18  /  AlkPhos  91  05-26          RADIOLOGY & ADDITIONAL STUDIES:

## 2017-05-26 NOTE — CONSULT NOTE ADULT - SUBJECTIVE AND OBJECTIVE BOX
Patient is a 51y old  Male who presents with a chief complaint of abdominal pain (24 May 2017 13:18)      HPI:  51M w/PMH of CAD, MI s/p angioplasty 10 years currently not on aspirin, HTN, and chronic pancreatitis 2/2 to EtOH abuse(last drink was in [place]), atraumatic splenic hematoma treated non operatively(NICOLE drain placed on 2/1/17 removed 2/2/17), segmental PE that has resolved presents to [facility] ED today with abdominal pain that began on Sunday after a fishing trip. Pain located in LLQ with radiation to RUQ, constant, stabbing, since the initiation of pain the patient has not been able to tolerate anything PO. The pain is worsened with deep inspirations. Admits to sweats, no fevers or chills. Denies cp/sob/palp (24 May 2017 13:18)      PAST MEDICAL & SURGICAL HISTORY:  Polysubstance abuse  Pancreatitis  Essential hypertension: HTN (hypertension)  Nondependent alcohol abuse: Alcohol abuse  Atherosclerosis of coronary artery: CAD (coronary artery disease)  Angina pectoris: Anginal pain  Status post percutaneous transluminal coronary angioplasty: S/P angioplasty      PREVIOUS DIAGNOSTIC TESTING:      ECHO  FINDINGS:    STRESS  FINDINGS:    CATHETERIZATION  FINDINGS:    MEDICATIONS  (STANDING):  heparin  Injectable 5000Unit(s) SubCutaneous every 8 hours  lactated ringers. 1000milliLiter(s) IV Continuous <Continuous>  insulin lispro (HumaLOG) corrective regimen sliding scale  SubCutaneous Before meals and at bedtime  dextrose 5%. 1000milliLiter(s) IV Continuous <Continuous>  dextrose 50% Injectable 12.5Gram(s) IV Push once  dextrose 50% Injectable 25Gram(s) IV Push once  dextrose 50% Injectable 25Gram(s) IV Push once  diltiazem   CD 120milliGRAM(s) Oral daily  lisinopril 20milliGRAM(s) Oral daily  pantoprazole    Tablet 40milliGRAM(s) Oral before breakfast    MEDICATIONS  (PRN):  ondansetron Injectable 4milliGRAM(s) IV Push every 6 hours PRN Nausea  dextrose Gel 1Dose(s) Oral once PRN Blood Glucose LESS THAN 70 milliGRAM(s)/deciliter  glucagon  Injectable 1milliGRAM(s) IntraMuscular once PRN Glucose LESS THAN 70 milligrams/deciliter  oxyCODONE  5 mG/acetaminophen 325 mG 1Tablet(s) Oral every 4 hours PRN Severe Pain (7 - 10)  acetaminophen   Tablet. 650milliGRAM(s) Oral every 6 hours PRN Moderate Pain (4 - 6)      FAMILY HISTORY:  Family history of diabetes mellitus (Sibling)  Family history of Hodgkin&#x27;s lymphoma (Mother)  Family history of mental disorder (Father): Family history of alcoholism      SOCIAL HISTORY:    CIGARETTES: current smoker    ALCOHOL: current drinker    REVIEW OF SYSTEMS:  CONSTITUTIONAL: No fever, + weight loss, + fatigue  EYES: No eye pain, visual disturbances, or discharge  ENMT:  No difficulty hearing, tinnitus, vertigo; No sinus or throat pain  NECK: No pain or stiffness  RESPIRATORY: No cough, wheezing, chills or hemoptysis; No Shortness of Breath  CARDIOVASCULAR: No chest pain, palpitations, passing out, dizziness, or leg swelling  GASTROINTESTINAL: + abdominalpain. + nausea, vomiting, No diarrhea or constipation. No melena or hematochezia.  GENITOURINARY: No dysuria, frequency, hematuria, or incontinence  NEUROLOGICAL: No headaches, memory loss, loss of strength, numbness, or tremors  SKIN: No itching, burning, rashes, or lesions   LYMPH Nodes: No enlarged glands  ENDOCRINE: No heat or cold intolerance; No hair loss  MUSCULOSKELETAL: No joint pain or swelling; No muscle, back, or extremity pain  PSYCHIATRIC: No depression, anxiety, mood swings, or difficulty sleeping  HEME/LYMPH: No easy bruising, or bleeding gums  ALLERY AND IMMUNOLOGIC: No hives or eczema	  Vital Signs Last 24 Hrs  T(C): 36.7, Max: 36.9 (05-25 @ 20:16)  T(F): 98.1, Max: 98.4 (05-25 @ 20:16)  HR: 83 (70 - 87)  BP: 142/91 (142/91 - 181/105)  BP(mean): --  RR: 17 (16 - 18)  SpO2: 95% (95% - 100%)    PHYSICAL EXAM:  Appearance: Normal	  HEENT:   Normal oral mucosa, PERRL, EOMI	  Lymphatic: No lymphadenopathy  Cardiovascular: Normal S1 S2, No JVD, No murmurs,    Respiratory: Lungs clear to auscultation	  Psychiatry: A & O x 3, Mood & affect appropriate  Gastrointestinal:  Soft, Non-tender, + BS	  Skin: No rashes, No ecchymoses, No cyanosis  Neurologic: Non-focal  Extremities: Normal range of motion, No clubbing, cyanosis , trace edema  Vascular: Peripheral pulses palpable 2+ bilaterally      INTERPRETATION OF TELEMETRY:    ECG:    I&O's Detail    I & Os for current day (as of 26 May 2017 07:56)  =============================================  IN:    lactated ringers.: 1800 ml    Total IN: 1800 ml  ---------------------------------------------  OUT:    Total OUT: 0 ml  ---------------------------------------------  Total NET: 1800 ml      LABS:                        13.6   9.8   )-----------( 193      ( 26 May 2017 06:12 )             39.1     05-26    136  |  96  |  8   ----------------------------<  102<H>  4.3   |  27  |  0.60    Ca    9.7      26 May 2017 06:12  Phos  3.6     05-26  Mg     2.0     05-26    TPro  7.7  /  Alb  3.9  /  TBili  0.9  /  DBili  x   /  AST  13  /  ALT  18  /  AlkPhos  91  05-26    CARDIAC MARKERS ( 24 May 2017 13:36 )  x     / <0.01 ng/mL / x     / x     / x      CARDIAC MARKERS ( 24 May 2017 08:54 )  x     / <0.01 ng/mL / 86 U/L / x     / 1.4 ng/mL      PT/INR - ( 24 May 2017 13:36 )   PT: 11.7 sec;   INR: 1.05          PTT - ( 24 May 2017 13:36 )  PTT:30.2 sec  Urinalysis Basic - ( 24 May 2017 11:49 )    Color: Yellow / Appearance: Clear / SG: <=1.005 / pH: x  Gluc: x / Ketone: NEGATIVE  / Bili: NEGATIVE / Urobili: 0.2 E.U./dL   Blood: x / Protein: NEGATIVE mg/dL / Nitrite: NEGATIVE   Leuk Esterase: NEGATIVE / RBC: x / WBC x   Sq Epi: x / Non Sq Epi: x / Bacteria: x      I&O's Summary    I & Os for current day (as of 26 May 2017 07:56)  =============================================  IN: 1800 ml / OUT: 0 ml / NET: 1800 ml    BNP  RADIOLOGY & ADDITIONAL STUDIES:

## 2017-05-26 NOTE — CONSULT NOTE ADULT - PROBLEM SELECTOR RECOMMENDATION 9
Remote PTCA 10 y/a for CP.  Currently ambulates without difficulty.  I can not confirm exertional angina by hx but he does contiue to smoke.  Takes Atorvastatin as outpt which should be resumed since there is literature that it improves surgical outcomes.

## 2017-05-27 DIAGNOSIS — I25.10 ATHEROSCLEROTIC HEART DISEASE OF NATIVE CORONARY ARTERY WITHOUT ANGINA PECTORIS: ICD-10-CM

## 2017-05-27 LAB
ALBUMIN SERPL ELPH-MCNC: 4.1 G/DL — SIGNIFICANT CHANGE UP (ref 3.3–5)
ALP SERPL-CCNC: 91 U/L — SIGNIFICANT CHANGE UP (ref 40–120)
ALT FLD-CCNC: 16 U/L — SIGNIFICANT CHANGE UP (ref 10–45)
ANION GAP SERPL CALC-SCNC: 13 MMOL/L — SIGNIFICANT CHANGE UP (ref 5–17)
AST SERPL-CCNC: 13 U/L — SIGNIFICANT CHANGE UP (ref 10–40)
BILIRUB SERPL-MCNC: 0.5 MG/DL — SIGNIFICANT CHANGE UP (ref 0.2–1.2)
BUN SERPL-MCNC: 5 MG/DL — LOW (ref 7–23)
CALCIUM SERPL-MCNC: 9.6 MG/DL — SIGNIFICANT CHANGE UP (ref 8.4–10.5)
CHLORIDE SERPL-SCNC: 98 MMOL/L — SIGNIFICANT CHANGE UP (ref 96–108)
CO2 SERPL-SCNC: 26 MMOL/L — SIGNIFICANT CHANGE UP (ref 22–31)
CREAT SERPL-MCNC: 0.7 MG/DL — SIGNIFICANT CHANGE UP (ref 0.5–1.3)
GLUCOSE SERPL-MCNC: 126 MG/DL — HIGH (ref 70–99)
HCT VFR BLD CALC: 37.6 % — LOW (ref 39–50)
HGB BLD-MCNC: 12.9 G/DL — LOW (ref 13–17)
MAGNESIUM SERPL-MCNC: 2 MG/DL — SIGNIFICANT CHANGE UP (ref 1.6–2.6)
MCHC RBC-ENTMCNC: 30 PG — SIGNIFICANT CHANGE UP (ref 27–34)
MCHC RBC-ENTMCNC: 34.3 G/DL — SIGNIFICANT CHANGE UP (ref 32–36)
MCV RBC AUTO: 87.4 FL — SIGNIFICANT CHANGE UP (ref 80–100)
PHOSPHATE SERPL-MCNC: 3.5 MG/DL — SIGNIFICANT CHANGE UP (ref 2.5–4.5)
PLATELET # BLD AUTO: 196 K/UL — SIGNIFICANT CHANGE UP (ref 150–400)
POTASSIUM SERPL-MCNC: 3.9 MMOL/L — SIGNIFICANT CHANGE UP (ref 3.5–5.3)
POTASSIUM SERPL-SCNC: 3.9 MMOL/L — SIGNIFICANT CHANGE UP (ref 3.5–5.3)
PROT SERPL-MCNC: 7.6 G/DL — SIGNIFICANT CHANGE UP (ref 6–8.3)
RBC # BLD: 4.3 M/UL — SIGNIFICANT CHANGE UP (ref 4.2–5.8)
RBC # FLD: 13.8 % — SIGNIFICANT CHANGE UP (ref 10.3–16.9)
SODIUM SERPL-SCNC: 137 MMOL/L — SIGNIFICANT CHANGE UP (ref 135–145)
WBC # BLD: 10.2 K/UL — SIGNIFICANT CHANGE UP (ref 3.8–10.5)
WBC # FLD AUTO: 10.2 K/UL — SIGNIFICANT CHANGE UP (ref 3.8–10.5)

## 2017-05-27 RX ORDER — HYDROMORPHONE HYDROCHLORIDE 2 MG/ML
4 INJECTION INTRAMUSCULAR; INTRAVENOUS; SUBCUTANEOUS EVERY 4 HOURS
Qty: 0 | Refills: 0 | Status: DISCONTINUED | OUTPATIENT
Start: 2017-05-27 | End: 2017-05-30

## 2017-05-27 RX ORDER — DOCUSATE SODIUM 100 MG
100 CAPSULE ORAL
Qty: 0 | Refills: 0 | Status: DISCONTINUED | OUTPATIENT
Start: 2017-05-27 | End: 2017-05-31

## 2017-05-27 RX ORDER — POTASSIUM CHLORIDE 20 MEQ
20 PACKET (EA) ORAL ONCE
Qty: 0 | Refills: 0 | Status: COMPLETED | OUTPATIENT
Start: 2017-05-27 | End: 2017-05-27

## 2017-05-27 RX ORDER — HYDROMORPHONE HYDROCHLORIDE 2 MG/ML
2 INJECTION INTRAMUSCULAR; INTRAVENOUS; SUBCUTANEOUS EVERY 4 HOURS
Qty: 0 | Refills: 0 | Status: DISCONTINUED | OUTPATIENT
Start: 2017-05-27 | End: 2017-05-30

## 2017-05-27 RX ADMIN — HYDROMORPHONE HYDROCHLORIDE 1 MILLIGRAM(S): 2 INJECTION INTRAMUSCULAR; INTRAVENOUS; SUBCUTANEOUS at 06:15

## 2017-05-27 RX ADMIN — Medication 2: at 12:36

## 2017-05-27 RX ADMIN — Medication 2: at 22:30

## 2017-05-27 RX ADMIN — Medication 120 MILLIGRAM(S): at 06:46

## 2017-05-27 RX ADMIN — HEPARIN SODIUM 5000 UNIT(S): 5000 INJECTION INTRAVENOUS; SUBCUTANEOUS at 06:15

## 2017-05-27 RX ADMIN — HYDROMORPHONE HYDROCHLORIDE 4 MILLIGRAM(S): 2 INJECTION INTRAMUSCULAR; INTRAVENOUS; SUBCUTANEOUS at 20:11

## 2017-05-27 RX ADMIN — HYDROMORPHONE HYDROCHLORIDE 4 MILLIGRAM(S): 2 INJECTION INTRAMUSCULAR; INTRAVENOUS; SUBCUTANEOUS at 20:41

## 2017-05-27 RX ADMIN — HYDROMORPHONE HYDROCHLORIDE 1 MILLIGRAM(S): 2 INJECTION INTRAMUSCULAR; INTRAVENOUS; SUBCUTANEOUS at 06:45

## 2017-05-27 RX ADMIN — PANTOPRAZOLE SODIUM 40 MILLIGRAM(S): 20 TABLET, DELAYED RELEASE ORAL at 06:15

## 2017-05-27 RX ADMIN — HEPARIN SODIUM 5000 UNIT(S): 5000 INJECTION INTRAVENOUS; SUBCUTANEOUS at 16:55

## 2017-05-27 RX ADMIN — ATORVASTATIN CALCIUM 40 MILLIGRAM(S): 80 TABLET, FILM COATED ORAL at 22:30

## 2017-05-27 RX ADMIN — LISINOPRIL 20 MILLIGRAM(S): 2.5 TABLET ORAL at 06:16

## 2017-05-27 RX ADMIN — Medication 20 MILLIEQUIVALENT(S): at 12:37

## 2017-05-27 NOTE — PROGRESS NOTE ADULT - SUBJECTIVE AND OBJECTIVE BOX
O/N: Pt c/o no pain control with percocet, with added side effect of nausea, switched to dilaudid. Tumor markers ordered for AM and MRI to further evaluate pancreatic mass in preparation for OR.   5/26: Started CLD and home meds. Increased pain meds. Cristhian cleared him for OR.

## 2017-05-27 NOTE — PROGRESS NOTE ADULT - ASSESSMENT
50 y/o M with chronic pancreatitis CAD, MI s/p PCI, now with mass in distal pancreas    Soft mechanical diet  DVT ppx  IS  OOBA  AM labs  MRI  OR tuesday for distal pancreatectomy

## 2017-05-27 NOTE — PROGRESS NOTE ADULT - SUBJECTIVE AND OBJECTIVE BOX
INTERVAL HISTORY:  Awaiting surgery  	  MEDICATIONS:  diltiazem   CD 120milliGRAM(s) Oral daily  lisinopril 20milliGRAM(s) Oral daily        ondansetron Injectable 4milliGRAM(s) IV Push every 6 hours PRN  HYDROmorphone  Injectable 0.5milliGRAM(s) IV Push every 4 hours PRN  HYDROmorphone  Injectable 1milliGRAM(s) IV Push every 4 hours PRN    pantoprazole    Tablet 40milliGRAM(s) Oral before breakfast    insulin lispro (HumaLOG) corrective regimen sliding scale  SubCutaneous Before meals and at bedtime  dextrose Gel 1Dose(s) Oral once PRN  dextrose 50% Injectable 12.5Gram(s) IV Push once  dextrose 50% Injectable 25Gram(s) IV Push once  dextrose 50% Injectable 25Gram(s) IV Push once  glucagon  Injectable 1milliGRAM(s) IntraMuscular once PRN  atorvastatin 40milliGRAM(s) Oral at bedtime    heparin  Injectable 5000Unit(s) SubCutaneous every 8 hours  dextrose 5%. 1000milliLiter(s) IV Continuous <Continuous>  multivitamin/thiamine/folic acid in sodium chloride 0.9% 1000milliLiter(s) IV Continuous <Continuous>  potassium chloride    Tablet ER 20milliEquivalent(s) Oral once        PHYSICAL EXAM:  T(C): 36.4, Max: 36.9 (05-27 @ 00:32)  HR: 81 (75 - 96)  BP: 139/78 (137/89 - 198/91)  RR: 18 (16 - 18)  SpO2: 95% (95% - 100%)  Wt(kg): --  I&O's Summary    I & Os for current day (as of 27 May 2017 09:18)  =============================================  IN: 580 ml / OUT: 2350 ml / NET: -1770 ml        Appearance: Normal	  HEENT:   Normal oral mucosa, PERRL, EOMI	  Lymphatic: No lymphadenopathy  Cardiovascular: Normal S1 S2, No JVD, No murmur   Respiratory: Lungs clear to auscultation	  Psychiatry: A & O x 3, Mood & affect appropriate  Gastrointestinal:  Soft, Non-tender, + BS	  Skin: No rashes, No ecchymoses, No cyanosis  Neurologic: Non-focal  Extremities: Normal range of motion, No clubbing, cyanosis , trace edema  Vascular: Peripheral pulses palpable 2+ bilaterally    TELEMETRY: 	    ECG:  	  RADIOLOGY:   DIAGNOSTIC TESTING:  [ ] Echocardiogram:  [ ]  Catheterization:  [ ] Stress Test:    OTHER: 	    LABS:	 	    CARDIAC MARKERS:                                  12.9   10.2  )-----------( 196      ( 27 May 2017 06:59 )             37.6     05-27    137  |  98  |  5<L>  ----------------------------<  126<H>  3.9   |  26  |  0.70    Ca    9.6      27 May 2017 06:57  Phos  3.5     05-27  Mg     2.0     05-27    TPro  7.6  /  Alb  4.1  /  TBili  0.5  /  DBili  x   /  AST  13  /  ALT  16  /  AlkPhos  91  05-27    proBNP:   Lipid Profile:   HgA1c:   TSH:     ASSESSMENT/PLAN:

## 2017-05-28 LAB
ANION GAP SERPL CALC-SCNC: 14 MMOL/L — SIGNIFICANT CHANGE UP (ref 5–17)
BUN SERPL-MCNC: 10 MG/DL — SIGNIFICANT CHANGE UP (ref 7–23)
CALCIUM SERPL-MCNC: 9.5 MG/DL — SIGNIFICANT CHANGE UP (ref 8.4–10.5)
CANCER AG125 SERPL-ACNC: 12 U/ML — SIGNIFICANT CHANGE UP
CHLORIDE SERPL-SCNC: 98 MMOL/L — SIGNIFICANT CHANGE UP (ref 96–108)
CO2 SERPL-SCNC: 25 MMOL/L — SIGNIFICANT CHANGE UP (ref 22–31)
CREAT SERPL-MCNC: 0.7 MG/DL — SIGNIFICANT CHANGE UP (ref 0.5–1.3)
GLUCOSE SERPL-MCNC: 125 MG/DL — HIGH (ref 70–99)
HCT VFR BLD CALC: 36.8 % — LOW (ref 39–50)
HGB BLD-MCNC: 12.5 G/DL — LOW (ref 13–17)
MAGNESIUM SERPL-MCNC: 2 MG/DL — SIGNIFICANT CHANGE UP (ref 1.6–2.6)
MCHC RBC-ENTMCNC: 29.8 PG — SIGNIFICANT CHANGE UP (ref 27–34)
MCHC RBC-ENTMCNC: 34 G/DL — SIGNIFICANT CHANGE UP (ref 32–36)
MCV RBC AUTO: 87.6 FL — SIGNIFICANT CHANGE UP (ref 80–100)
PHOSPHATE SERPL-MCNC: 3.9 MG/DL — SIGNIFICANT CHANGE UP (ref 2.5–4.5)
PLATELET # BLD AUTO: 206 K/UL — SIGNIFICANT CHANGE UP (ref 150–400)
POTASSIUM SERPL-MCNC: 4.2 MMOL/L — SIGNIFICANT CHANGE UP (ref 3.5–5.3)
POTASSIUM SERPL-SCNC: 4.2 MMOL/L — SIGNIFICANT CHANGE UP (ref 3.5–5.3)
RBC # BLD: 4.2 M/UL — SIGNIFICANT CHANGE UP (ref 4.2–5.8)
RBC # FLD: 13.8 % — SIGNIFICANT CHANGE UP (ref 10.3–16.9)
SODIUM SERPL-SCNC: 137 MMOL/L — SIGNIFICANT CHANGE UP (ref 135–145)
WBC # BLD: 7.8 K/UL — SIGNIFICANT CHANGE UP (ref 3.8–10.5)
WBC # FLD AUTO: 7.8 K/UL — SIGNIFICANT CHANGE UP (ref 3.8–10.5)

## 2017-05-28 PROCEDURE — 74182 MRI ABDOMEN W/CONTRAST: CPT | Mod: 26

## 2017-05-28 RX ADMIN — HYDROMORPHONE HYDROCHLORIDE 2 MILLIGRAM(S): 2 INJECTION INTRAMUSCULAR; INTRAVENOUS; SUBCUTANEOUS at 07:56

## 2017-05-28 RX ADMIN — LISINOPRIL 20 MILLIGRAM(S): 2.5 TABLET ORAL at 05:53

## 2017-05-28 RX ADMIN — Medication 120 MILLIGRAM(S): at 05:53

## 2017-05-28 RX ADMIN — HYDROMORPHONE HYDROCHLORIDE 4 MILLIGRAM(S): 2 INJECTION INTRAMUSCULAR; INTRAVENOUS; SUBCUTANEOUS at 21:38

## 2017-05-28 RX ADMIN — HEPARIN SODIUM 5000 UNIT(S): 5000 INJECTION INTRAVENOUS; SUBCUTANEOUS at 21:41

## 2017-05-28 RX ADMIN — HYDROMORPHONE HYDROCHLORIDE 2 MILLIGRAM(S): 2 INJECTION INTRAMUSCULAR; INTRAVENOUS; SUBCUTANEOUS at 08:56

## 2017-05-28 RX ADMIN — HEPARIN SODIUM 5000 UNIT(S): 5000 INJECTION INTRAVENOUS; SUBCUTANEOUS at 05:53

## 2017-05-28 RX ADMIN — ATORVASTATIN CALCIUM 40 MILLIGRAM(S): 80 TABLET, FILM COATED ORAL at 21:41

## 2017-05-28 RX ADMIN — Medication 100 MILLIGRAM(S): at 05:53

## 2017-05-28 RX ADMIN — SODIUM CHLORIDE 145 MILLILITER(S): 9 INJECTION, SOLUTION INTRAVENOUS at 17:57

## 2017-05-28 RX ADMIN — HYDROMORPHONE HYDROCHLORIDE 4 MILLIGRAM(S): 2 INJECTION INTRAMUSCULAR; INTRAVENOUS; SUBCUTANEOUS at 22:02

## 2017-05-28 RX ADMIN — PANTOPRAZOLE SODIUM 40 MILLIGRAM(S): 20 TABLET, DELAYED RELEASE ORAL at 07:56

## 2017-05-28 RX ADMIN — Medication 100 MILLIGRAM(S): at 17:57

## 2017-05-28 RX ADMIN — Medication 2: at 21:47

## 2017-05-28 RX ADMIN — HEPARIN SODIUM 5000 UNIT(S): 5000 INJECTION INTRAVENOUS; SUBCUTANEOUS at 14:54

## 2017-05-28 NOTE — PROGRESS NOTE ADULT - SUBJECTIVE AND OBJECTIVE BOX
O/N: Afebrile, VSS. DORIE  5/27: patient refused MRI due to claustrophobia, will try again tomorrow with wife at bedside O/N: Afebrile, VSS. DORIE  5/27: patient refused MRI due to claustrophobia, will try again tomorrow with wife at bedside        SUBJECTIVE:  Flatus: [x ] YES [ ] NO             Bowel Movement: [ ] YES [ ] NO  Pain (0-10):            Pain Control Adequate: [x ] YES [ ] NO  Nausea: [ ] YES [x ] NO            Vomiting: [ ] YES [x ] NO  Diarrhea: [ ] YES [x ] NO         Constipation: [ ] YES [x ] NO     Chest Pain: [ ] YES [x ] NO    SOB:  [ ] YES [x ] NO    MEDICATIONS  (STANDING):  heparin  Injectable 5000Unit(s) SubCutaneous every 8 hours  insulin lispro (HumaLOG) corrective regimen sliding scale  SubCutaneous Before meals and at bedtime  dextrose 5%. 1000milliLiter(s) IV Continuous <Continuous>  dextrose 50% Injectable 12.5Gram(s) IV Push once  dextrose 50% Injectable 25Gram(s) IV Push once  dextrose 50% Injectable 25Gram(s) IV Push once  diltiazem   CD 120milliGRAM(s) Oral daily  lisinopril 20milliGRAM(s) Oral daily  pantoprazole    Tablet 40milliGRAM(s) Oral before breakfast  atorvastatin 40milliGRAM(s) Oral at bedtime  multivitamin/thiamine/folic acid in sodium chloride 0.9% 1000milliLiter(s) IV Continuous <Continuous>  docusate sodium 100milliGRAM(s) Oral two times a day    MEDICATIONS  (PRN):  ondansetron Injectable 4milliGRAM(s) IV Push every 6 hours PRN Nausea  dextrose Gel 1Dose(s) Oral once PRN Blood Glucose LESS THAN 70 milliGRAM(s)/deciliter  glucagon  Injectable 1milliGRAM(s) IntraMuscular once PRN Glucose LESS THAN 70 milligrams/deciliter  HYDROmorphone   Tablet 2milliGRAM(s) Oral every 4 hours PRN Moderate Pain (4 - 6)  HYDROmorphone   Tablet 4milliGRAM(s) Oral every 4 hours PRN Severe Pain (7 - 10)      Vital Signs Last 24 Hrs  T(C): 36.4, Max: 36.9 (05-27 @ 12:30)  T(F): 97.6, Max: 98.5 (05-27 @ 12:30)  HR: 77 (75 - 99)  BP: 127/86 (125/82 - 151/95)  BP(mean): --  RR: 17 (16 - 18)  SpO2: 97% (96% - 100%)    PHYSICAL EXAM:      Constitutional: A&Ox3      Respiratory: non labored breathing, no respiratory distress    Cardiovascular: NSR, RRR    Gastrointestinal: Soft, ND, mild tenderness on LLQ                 Genitourinary: Voiding    Extremities: (-) edema                  I&O's Detail    I & Os for current day (as of 28 May 2017 08:48)  =============================================  IN:    Oral Fluid: 1160 ml    Total IN: 1160 ml  ---------------------------------------------  OUT:    Voided: 2000 ml    Total OUT: 2000 ml  ---------------------------------------------  Total NET: -840 ml      LABS:                        12.5   7.8   )-----------( 206      ( 28 May 2017 06:40 )             36.8     05-28    137  |  98  |  10  ----------------------------<  125<H>  4.2   |  25  |  0.70    Ca    9.5      28 May 2017 06:40  Phos  3.9     05-28  Mg     2.0     05-28    TPro  7.6  /  Alb  4.1  /  TBili  0.5  /  DBili  x   /  AST  13  /  ALT  16  /  AlkPhos  91  05-27          RADIOLOGY & ADDITIONAL STUDIES:

## 2017-05-29 LAB
ANION GAP SERPL CALC-SCNC: 15 MMOL/L — SIGNIFICANT CHANGE UP (ref 5–17)
BLD GP AB SCN SERPL QL: NEGATIVE — SIGNIFICANT CHANGE UP
BUN SERPL-MCNC: 10 MG/DL — SIGNIFICANT CHANGE UP (ref 7–23)
CALCIUM SERPL-MCNC: 9.2 MG/DL — SIGNIFICANT CHANGE UP (ref 8.4–10.5)
CANCER AG19-9 SERPL-ACNC: 30.4 U/ML — SIGNIFICANT CHANGE UP
CANCER AG27-29 SERPL-ACNC: 20.4 U/ML — SIGNIFICANT CHANGE UP (ref 0–37.7)
CHLORIDE SERPL-SCNC: 98 MMOL/L — SIGNIFICANT CHANGE UP (ref 96–108)
CO2 SERPL-SCNC: 23 MMOL/L — SIGNIFICANT CHANGE UP (ref 22–31)
CREAT SERPL-MCNC: 0.7 MG/DL — SIGNIFICANT CHANGE UP (ref 0.5–1.3)
GLUCOSE SERPL-MCNC: 161 MG/DL — HIGH (ref 70–99)
HCT VFR BLD CALC: 36.8 % — LOW (ref 39–50)
HGB BLD-MCNC: 12.2 G/DL — LOW (ref 13–17)
MAGNESIUM SERPL-MCNC: 1.8 MG/DL — SIGNIFICANT CHANGE UP (ref 1.6–2.6)
MCHC RBC-ENTMCNC: 29.4 PG — SIGNIFICANT CHANGE UP (ref 27–34)
MCHC RBC-ENTMCNC: 33.2 G/DL — SIGNIFICANT CHANGE UP (ref 32–36)
MCV RBC AUTO: 88.7 FL — SIGNIFICANT CHANGE UP (ref 80–100)
PHOSPHATE SERPL-MCNC: 3.9 MG/DL — SIGNIFICANT CHANGE UP (ref 2.5–4.5)
PLATELET # BLD AUTO: 208 K/UL — SIGNIFICANT CHANGE UP (ref 150–400)
POTASSIUM SERPL-MCNC: 4.1 MMOL/L — SIGNIFICANT CHANGE UP (ref 3.5–5.3)
POTASSIUM SERPL-SCNC: 4.1 MMOL/L — SIGNIFICANT CHANGE UP (ref 3.5–5.3)
RBC # BLD: 4.15 M/UL — LOW (ref 4.2–5.8)
RBC # FLD: 13.2 % — SIGNIFICANT CHANGE UP (ref 10.3–16.9)
RH IG SCN BLD-IMP: POSITIVE — SIGNIFICANT CHANGE UP
SODIUM SERPL-SCNC: 136 MMOL/L — SIGNIFICANT CHANGE UP (ref 135–145)
WBC # BLD: 8.6 K/UL — SIGNIFICANT CHANGE UP (ref 3.8–10.5)
WBC # FLD AUTO: 8.6 K/UL — SIGNIFICANT CHANGE UP (ref 3.8–10.5)

## 2017-05-29 RX ORDER — DIPHENHYDRAMINE HCL 50 MG
25 CAPSULE ORAL ONCE
Qty: 0 | Refills: 0 | Status: COMPLETED | OUTPATIENT
Start: 2017-05-29 | End: 2017-05-29

## 2017-05-29 RX ORDER — MAGNESIUM SULFATE 500 MG/ML
1 VIAL (ML) INJECTION ONCE
Qty: 0 | Refills: 0 | Status: COMPLETED | OUTPATIENT
Start: 2017-05-29 | End: 2017-05-29

## 2017-05-29 RX ADMIN — Medication 25 MILLIGRAM(S): at 23:47

## 2017-05-29 RX ADMIN — HEPARIN SODIUM 5000 UNIT(S): 5000 INJECTION INTRAVENOUS; SUBCUTANEOUS at 22:14

## 2017-05-29 RX ADMIN — Medication 120 MILLIGRAM(S): at 05:50

## 2017-05-29 RX ADMIN — Medication 100 GRAM(S): at 16:26

## 2017-05-29 RX ADMIN — Medication 100 MILLIGRAM(S): at 16:26

## 2017-05-29 RX ADMIN — LISINOPRIL 20 MILLIGRAM(S): 2.5 TABLET ORAL at 05:50

## 2017-05-29 RX ADMIN — PANTOPRAZOLE SODIUM 40 MILLIGRAM(S): 20 TABLET, DELAYED RELEASE ORAL at 05:50

## 2017-05-29 RX ADMIN — Medication: at 12:37

## 2017-05-29 RX ADMIN — Medication 2: at 17:23

## 2017-05-29 RX ADMIN — ATORVASTATIN CALCIUM 40 MILLIGRAM(S): 80 TABLET, FILM COATED ORAL at 22:15

## 2017-05-29 RX ADMIN — Medication 2: at 22:14

## 2017-05-29 RX ADMIN — Medication 0.5 MILLIGRAM(S): at 12:32

## 2017-05-29 RX ADMIN — SODIUM CHLORIDE 145 MILLILITER(S): 9 INJECTION, SOLUTION INTRAVENOUS at 23:47

## 2017-05-29 NOTE — PROGRESS NOTE ADULT - SUBJECTIVE AND OBJECTIVE BOX
INTERVAL HISTORY:  No CP or SOB  	  MEDICATIONS:  diltiazem   CD 120milliGRAM(s) Oral daily  lisinopril 20milliGRAM(s) Oral daily  ondansetron Injectable 4milliGRAM(s) IV Push every 6 hours PRN  HYDROmorphone   Tablet 2milliGRAM(s) Oral every 4 hours PRN  HYDROmorphone   Tablet 4milliGRAM(s) Oral every 4 hours PRN    pantoprazole    Tablet 40milliGRAM(s) Oral before breakfast  docusate sodium 100milliGRAM(s) Oral two times a day    insulin lispro (HumaLOG) corrective regimen sliding scale  SubCutaneous Before meals and at bedtime  dextrose Gel 1Dose(s) Oral once PRN  dextrose 50% Injectable 12.5Gram(s) IV Push once  dextrose 50% Injectable 25Gram(s) IV Push once  dextrose 50% Injectable 25Gram(s) IV Push once  glucagon  Injectable 1milliGRAM(s) IntraMuscular once PRN  atorvastatin 40milliGRAM(s) Oral at bedtime    heparin  Injectable 5000Unit(s) SubCutaneous every 8 hours  dextrose 5%. 1000milliLiter(s) IV Continuous <Continuous>  multivitamin/thiamine/folic acid in sodium chloride 0.9% 1000milliLiter(s) IV Continuous <Continuous>  magnesium sulfate  IVPB 1Gram(s) IV Intermittent once        PHYSICAL EXAM:  T(C): 36.4, Max: 37 (05-28 @ 17:50)  HR: 70 (70 - 87)  BP: 128/89 (128/89 - 147/93)  RR: 16 (16 - 18)  SpO2: 98% (96% - 99%)  Wt(kg): --  I&O's Summary    I & Os for current day (as of 29 May 2017 09:00)  =============================================  IN: 2245 ml / OUT: 850 ml / NET: 1395 ml        Appearance: Normal	  HEENT:   Normal oral mucosa, PERRL, EOMI	  Lymphatic: No lymphadenopathy  Cardiovascular: Normal S1 S2, No JVD, No murmurs,   Respiratory: Lungs clear to auscultation	  Psychiatry: A & O x 3, Mood & affect appropriate  Gastrointestinal:  Soft, Non-tender, + BS	  Skin: No rashes, No ecchymoses, No cyanosis  Neurologic: Non-focal  Extremities: Normal range of motion, No clubbing, cyanosis , trace edema     TELEMETRY: 	    ECG:  	  RADIOLOGY:   DIAGNOSTIC TESTING:  [ ] Echocardiogram:  [ ]  Catheterization:  [ ] Stress Test:    OTHER: 	    LABS:	 	    CARDIAC MARKERS:                                  12.2   8.6   )-----------( 208      ( 29 May 2017 06:36 )             36.8     05-29    136  |  98  |  10  ----------------------------<  161<H>  4.1   |  23  |  0.70    Ca    9.2      29 May 2017 06:36  Phos  3.9     05-29  Mg     1.8     05-29      proBNP:   Lipid Profile:   HgA1c:   TSH:     ASSESSMENT/PLAN:

## 2017-05-29 NOTE — PROGRESS NOTE ADULT - ASSESSMENT
A/P: 51yMale planned for above procedure  1. NPO past midnight, except medications  2. IVFdextrose 5%. 1000milliLiter(s) IV Continuous <Continuous>  multivitamin/thiamine/folic acid in sodium chloride 0.9% 1000milliLiter(s) IV Continuous <Continuous>  magnesium sulfate  IVPB 1Gram(s) IV Intermittent once.  3) Patient cleared by cardiology

## 2017-05-29 NOTE — PROGRESS NOTE ADULT - SUBJECTIVE AND OBJECTIVE BOX
PRE OPERATIVE NOTE    Pre-op Diagnosis: Pancreatic mass   Procedure: ex lap , distal pancreatectomy and splenectomy     Surgeon: Dr. Hu                              12.2   8.6   )-----------( 208      ( 29 May 2017 06:36 )             36.8     05-29    136  |  98  |  10  ----------------------------<  161<H>  4.1   |  23  |  0.70    Ca    9.2      29 May 2017 06:36  Phos  3.9     05-29  Mg     1.8     05-29            Type & Screen: B+ , antibody screen negative   CXR: Clear lungs. No pleural effusions. Cardiomediastinal silhouette, and soft tissues unremarkable. Degenerative changes of spine.  EKG: Normal sinus rhythmPossible Left atrial enlargement ST abnormality, possible digitalis effect        A/P: 51yMale planned for above procedure  1. NPO past midnight, except medications  2. IVFdextrose 5%. 1000milliLiter(s) IV Continuous <Continuous>  multivitamin/thiamine/folic acid in sodium chloride 0.9% 1000milliLiter(s) IV Continuous <Continuous>  magnesium sulfate  IVPB 1Gram(s) IV Intermittent once

## 2017-05-29 NOTE — PROGRESS NOTE ADULT - ATTENDING COMMENTS
await MRI findings  preop plans
awaiting further testing for preop
pt unable to tolerate MRI  otherwise stable  OR preop

## 2017-05-30 LAB
ALBUMIN SERPL ELPH-MCNC: 3.9 G/DL — SIGNIFICANT CHANGE UP (ref 3.3–5)
ALP SERPL-CCNC: 93 U/L — SIGNIFICANT CHANGE UP (ref 40–120)
ALT FLD-CCNC: 32 U/L — SIGNIFICANT CHANGE UP (ref 10–45)
ANION GAP SERPL CALC-SCNC: 13 MMOL/L — SIGNIFICANT CHANGE UP (ref 5–17)
APTT BLD: 33.4 SEC — SIGNIFICANT CHANGE UP (ref 27.5–37.4)
AST SERPL-CCNC: 21 U/L — SIGNIFICANT CHANGE UP (ref 10–40)
BILIRUB SERPL-MCNC: 0.2 MG/DL — SIGNIFICANT CHANGE UP (ref 0.2–1.2)
BUN SERPL-MCNC: 11 MG/DL — SIGNIFICANT CHANGE UP (ref 7–23)
CALCIUM SERPL-MCNC: 9.5 MG/DL — SIGNIFICANT CHANGE UP (ref 8.4–10.5)
CHLORIDE SERPL-SCNC: 100 MMOL/L — SIGNIFICANT CHANGE UP (ref 96–108)
CO2 SERPL-SCNC: 24 MMOL/L — SIGNIFICANT CHANGE UP (ref 22–31)
CREAT SERPL-MCNC: 0.6 MG/DL — SIGNIFICANT CHANGE UP (ref 0.5–1.3)
GLUCOSE SERPL-MCNC: 132 MG/DL — HIGH (ref 70–99)
INR BLD: 1.06 — SIGNIFICANT CHANGE UP (ref 0.88–1.16)
MAGNESIUM SERPL-MCNC: 1.9 MG/DL — SIGNIFICANT CHANGE UP (ref 1.6–2.6)
PHOSPHATE SERPL-MCNC: 3.9 MG/DL — SIGNIFICANT CHANGE UP (ref 2.5–4.5)
POTASSIUM SERPL-MCNC: 4.3 MMOL/L — SIGNIFICANT CHANGE UP (ref 3.5–5.3)
POTASSIUM SERPL-SCNC: 4.3 MMOL/L — SIGNIFICANT CHANGE UP (ref 3.5–5.3)
PROT SERPL-MCNC: 7.3 G/DL — SIGNIFICANT CHANGE UP (ref 6–8.3)
PROTHROM AB SERPL-ACNC: 11.8 SEC — SIGNIFICANT CHANGE UP (ref 9.8–12.7)
SODIUM SERPL-SCNC: 137 MMOL/L — SIGNIFICANT CHANGE UP (ref 135–145)

## 2017-05-30 RX ORDER — KETOROLAC TROMETHAMINE 30 MG/ML
30 SYRINGE (ML) INJECTION EVERY 6 HOURS
Qty: 0 | Refills: 0 | Status: DISCONTINUED | OUTPATIENT
Start: 2017-05-30 | End: 2017-05-30

## 2017-05-30 RX ORDER — SIMVASTATIN 20 MG/1
0 TABLET, FILM COATED ORAL
Qty: 0 | Refills: 0 | COMMUNITY

## 2017-05-30 RX ORDER — PNEUMOCOCCAL 23-VAL P-SAC VAC 25MCG/0.5
0.5 VIAL (ML) INJECTION ONCE
Qty: 0 | Refills: 0 | Status: COMPLETED | OUTPATIENT
Start: 2017-05-30 | End: 2017-05-30

## 2017-05-30 RX ORDER — TRAMADOL HYDROCHLORIDE 50 MG/1
25 TABLET ORAL ONCE
Qty: 0 | Refills: 0 | Status: DISCONTINUED | OUTPATIENT
Start: 2017-05-30 | End: 2017-05-30

## 2017-05-30 RX ORDER — DIPHENHYDRAMINE HCL 50 MG
25 CAPSULE ORAL AT BEDTIME
Qty: 0 | Refills: 0 | Status: DISCONTINUED | OUTPATIENT
Start: 2017-05-30 | End: 2017-05-31

## 2017-05-30 RX ORDER — ACETAMINOPHEN 500 MG
650 TABLET ORAL EVERY 6 HOURS
Qty: 0 | Refills: 0 | Status: DISCONTINUED | OUTPATIENT
Start: 2017-05-30 | End: 2017-05-31

## 2017-05-30 RX ORDER — DILTIAZEM HCL 120 MG
1 CAPSULE, EXT RELEASE 24 HR ORAL
Qty: 0 | Refills: 0 | DISCHARGE
Start: 2017-05-30

## 2017-05-30 RX ORDER — NICOTINE POLACRILEX 2 MG
2 GUM BUCCAL THREE TIMES A DAY
Qty: 0 | Refills: 0 | Status: DISCONTINUED | OUTPATIENT
Start: 2017-05-30 | End: 2017-05-31

## 2017-05-30 RX ORDER — SODIUM CHLORIDE 9 MG/ML
1000 INJECTION INTRAMUSCULAR; INTRAVENOUS; SUBCUTANEOUS
Qty: 0 | Refills: 0 | Status: DISCONTINUED | OUTPATIENT
Start: 2017-05-30 | End: 2017-05-31

## 2017-05-30 RX ORDER — NEISSERIA MENINGITIDIS GROUP A CAPSULAR POLYSACCHARIDE ANTIGEN, NEISSERIA MENINGITIDIS GROUP C CAPSULAR POLYSACCHARIDE ANTIGEN, NEISSERIA MENINGITIDIS GROUP Y CAPSULAR POLYSACCHARIDE ANTIGEN AND NEISSERIA MENINGITIDIS GROUP W-135 CAPSULAR POLYSACCHARIDE ANTIGEN 50 MCG
0.5 KIT SUBCUTANEOUS ONCE
Qty: 0 | Refills: 0 | Status: COMPLETED | OUTPATIENT
Start: 2017-05-30 | End: 2017-05-30

## 2017-05-30 RX ORDER — IBUPROFEN 200 MG
600 TABLET ORAL EVERY 6 HOURS
Qty: 0 | Refills: 0 | Status: DISCONTINUED | OUTPATIENT
Start: 2017-05-30 | End: 2017-05-31

## 2017-05-30 RX ORDER — FAMOTIDINE 10 MG/ML
20 INJECTION INTRAVENOUS ONCE
Qty: 0 | Refills: 0 | Status: COMPLETED | OUTPATIENT
Start: 2017-05-30 | End: 2017-05-30

## 2017-05-30 RX ORDER — DIPHENHYDRAMINE HCL 50 MG
25 CAPSULE ORAL AT BEDTIME
Qty: 0 | Refills: 0 | Status: DISCONTINUED | OUTPATIENT
Start: 2017-05-30 | End: 2017-05-30

## 2017-05-30 RX ORDER — METOCLOPRAMIDE HCL 10 MG
10 TABLET ORAL ONCE
Qty: 0 | Refills: 0 | Status: DISCONTINUED | OUTPATIENT
Start: 2017-05-30 | End: 2017-05-31

## 2017-05-30 RX ORDER — ATORVASTATIN CALCIUM 80 MG/1
1 TABLET, FILM COATED ORAL
Qty: 0 | Refills: 0 | COMMUNITY
Start: 2017-05-30

## 2017-05-30 RX ORDER — HAEMOPH B POLYSAC CONJ-MENING 7.5MCG/0.5
0.5 VIAL (ML) INTRAMUSCULAR ONCE
Qty: 0 | Refills: 0 | Status: COMPLETED | OUTPATIENT
Start: 2017-05-30 | End: 2017-05-30

## 2017-05-30 RX ADMIN — PANTOPRAZOLE SODIUM 40 MILLIGRAM(S): 20 TABLET, DELAYED RELEASE ORAL at 06:51

## 2017-05-30 RX ADMIN — Medication 600 MILLIGRAM(S): at 19:20

## 2017-05-30 RX ADMIN — HEPARIN SODIUM 5000 UNIT(S): 5000 INJECTION INTRAVENOUS; SUBCUTANEOUS at 05:24

## 2017-05-30 RX ADMIN — Medication 2 MILLIGRAM(S): at 14:11

## 2017-05-30 RX ADMIN — Medication 100 MILLIGRAM(S): at 05:25

## 2017-05-30 RX ADMIN — HYDROMORPHONE HYDROCHLORIDE 4 MILLIGRAM(S): 2 INJECTION INTRAMUSCULAR; INTRAVENOUS; SUBCUTANEOUS at 16:30

## 2017-05-30 RX ADMIN — FAMOTIDINE 20 MILLIGRAM(S): 10 INJECTION INTRAVENOUS at 17:36

## 2017-05-30 RX ADMIN — ATORVASTATIN CALCIUM 40 MILLIGRAM(S): 80 TABLET, FILM COATED ORAL at 21:26

## 2017-05-30 RX ADMIN — TRAMADOL HYDROCHLORIDE 25 MILLIGRAM(S): 50 TABLET ORAL at 23:38

## 2017-05-30 RX ADMIN — Medication 0.5 MILLILITER(S): at 17:32

## 2017-05-30 RX ADMIN — NEISSERIA MENINGITIDIS GROUP A CAPSULAR POLYSACCHARIDE ANTIGEN, NEISSERIA MENINGITIDIS GROUP C CAPSULAR POLYSACCHARIDE ANTIGEN, NEISSERIA MENINGITIDIS GROUP Y CAPSULAR POLYSACCHARIDE ANTIGEN AND NEISSERIA MENINGITIDIS GROUP W-135 CAPSULAR POLYSACCHARIDE ANTIGEN 0.5 MILLILITER(S): KIT at 17:35

## 2017-05-30 RX ADMIN — TRAMADOL HYDROCHLORIDE 25 MILLIGRAM(S): 50 TABLET ORAL at 21:24

## 2017-05-30 RX ADMIN — ONDANSETRON 4 MILLIGRAM(S): 8 TABLET, FILM COATED ORAL at 19:11

## 2017-05-30 RX ADMIN — Medication 0.5 MILLILITER(S): at 17:34

## 2017-05-30 RX ADMIN — Medication 120 MILLIGRAM(S): at 05:25

## 2017-05-30 RX ADMIN — HEPARIN SODIUM 5000 UNIT(S): 5000 INJECTION INTRAVENOUS; SUBCUTANEOUS at 21:26

## 2017-05-30 RX ADMIN — Medication 2 MILLIGRAM(S): at 21:25

## 2017-05-30 RX ADMIN — LISINOPRIL 20 MILLIGRAM(S): 2.5 TABLET ORAL at 05:25

## 2017-05-30 RX ADMIN — Medication 600 MILLIGRAM(S): at 20:00

## 2017-05-30 RX ADMIN — Medication 30 MILLILITER(S): at 17:32

## 2017-05-30 RX ADMIN — Medication 100 MILLIGRAM(S): at 17:36

## 2017-05-30 RX ADMIN — HYDROMORPHONE HYDROCHLORIDE 4 MILLIGRAM(S): 2 INJECTION INTRAMUSCULAR; INTRAVENOUS; SUBCUTANEOUS at 15:58

## 2017-05-30 NOTE — PROGRESS NOTE ADULT - SUBJECTIVE AND OBJECTIVE BOX
Enlarging pancreatic tail mass( from 0,8 to 4,0 cm over the year) with superimposed pancreatitis and parasplenic hematoma from previous spontaneous rupture ( 6 month ago), tumor markers are N. For EUS with BX. Discussed with PT

## 2017-05-30 NOTE — PRE-OP CHECKLIST - SELECT TESTS ORDERED
PT/PTT/Urinalysis/INR/CBC/Type and Screen/BMP
CBC/PT/PTT/Hepatic Function/CMP/Abdominal CT/UCG/Urinalysis/CXR/EKG/INR

## 2017-05-30 NOTE — DISCHARGE NOTE ADULT - PLAN OF CARE
Recovery and Follow Up Please follow up with Dr. Hu in 1-2 weeks. Call the office at the number below to make an appointment.   Please follow up with Dr. Navarro in 3-5 days. Call the office at the number below to schedule your endoscopy appointment.   Resume a regular diet as tolerated.  Resume all normal activities as tolerated.   Stay away from alcohol and foods that are extremely greasy.   Return to the ER or notify your physician if you experience recurrence of your symptoms. Please follow up with Dr. Hu in 1-2 weeks. Call the office at the number below to make an appointment.   Please follow up with Dr. Navarro in 3-5 days. Call the office at the number below to schedule your endoscopic biopsy appointment.   Resume a regular diet as tolerated.  Resume all normal activities as tolerated.   Stay away from alcohol and foods that are extremely greasy.   Return to the ER or notify your physician if you experience recurrence of your symptoms. Please follow up with Dr. Hu in 2 weeks after the biopsy is performed. Call the office at the number below to make an appointment.   An appointment was made for you already for endoscopic biopsy with Dr. Navarro for June 7, 2017 at 1 pm, please arrive at 12:30, do not eat anything 8 hours prior, you may arrive alone but someone must be present to take you home. The procedure will take place on the 2nd floor endoscopy suite.   Resume a regular diet as tolerated.  Resume all normal activities as tolerated.   Stay away from alcohol and foods that are extremely greasy.   Return to the ER or notify your physician if you experience recurrence of your symptoms.

## 2017-05-30 NOTE — DISCHARGE NOTE ADULT - CARE PROVIDER_API CALL
Aj Hu), Surgery  [address] [place] [address]  Phone: [phone]  Fax: [phone]    Jono Navarro), Gastroenterology; Internal Medicine  [address] [place] [address]  Phone: [phone]  Fax: [phone]

## 2017-05-30 NOTE — PROGRESS NOTE ADULT - SUBJECTIVE AND OBJECTIVE BOX
INTERVAL HISTORY:  Awaiting surgery  	  MEDICATIONS:  diltiazem   CD 120milliGRAM(s) Oral daily  lisinopril 20milliGRAM(s) Oral daily        ondansetron Injectable 4milliGRAM(s) IV Push every 6 hours PRN  HYDROmorphone   Tablet 2milliGRAM(s) Oral every 4 hours PRN  HYDROmorphone   Tablet 4milliGRAM(s) Oral every 4 hours PRN    pantoprazole    Tablet 40milliGRAM(s) Oral before breakfast  docusate sodium 100milliGRAM(s) Oral two times a day    insulin lispro (HumaLOG) corrective regimen sliding scale  SubCutaneous Before meals and at bedtime  dextrose Gel 1Dose(s) Oral once PRN  dextrose 50% Injectable 12.5Gram(s) IV Push once  dextrose 50% Injectable 25Gram(s) IV Push once  dextrose 50% Injectable 25Gram(s) IV Push once  glucagon  Injectable 1milliGRAM(s) IntraMuscular once PRN  atorvastatin 40milliGRAM(s) Oral at bedtime    heparin  Injectable 5000Unit(s) SubCutaneous every 8 hours  dextrose 5%. 1000milliLiter(s) IV Continuous <Continuous>  multivitamin/thiamine/folic acid in sodium chloride 0.9% 1000milliLiter(s) IV Continuous <Continuous>        PHYSICAL EXAM:  T(C): 36.9, Max: 37.5 (05-29 @ 17:03)  HR: 74 (74 - 95)  BP: 155/102 (142/95 - 169/109)  RR: 16 (16 - 18)  SpO2: 99% (97% - 99%)  Wt(kg): --  I&O's Summary    I & Os for current day (as of 30 May 2017 08:45)  =============================================  IN: 2500 ml / OUT: 2400 ml / NET: 100 ml    Height (cm): 185.4 (05-30 @ 08:15)  Weight (kg): 105 (05-30 @ 08:15)  BMI (kg/m2): 30.5 (05-30 @ 08:15)  BSA (m2): 2.29 (05-30 @ 08:15)    Appearance: Normal	  HEENT:   Normal oral mucosa, PERRL, EOMI	  Lymphatic: No lymphadenopathy  Cardiovascular: Normal S1 S2, No JVD, No murmurs, No edema  Respiratory: Lungs clear to auscultation	  Psychiatry: A & O x 3, Mood & affect appropriate  Gastrointestinal:  Soft, Non-tender, + BS	  Skin: Stasis changes, No ecchymoses, No cyanosis  Neurologic: Non-focal  Extremities: Normal range of motion, No clubbing, cyanosis or edema  Vascular: Peripheral pulses palpable 2+ bilaterally    TELEMETRY: 	    ECG:  	  RADIOLOGY:   DIAGNOSTIC TESTING:  [ ] Echocardiogram:  [ ]  Catheterization:  [ ] Stress Test:    OTHER: 	    LABS:	 	    CARDIAC MARKERS:                                  12.2   8.6   )-----------( 208      ( 29 May 2017 06:36 )             36.8     05-30    137  |  100  |  11  ----------------------------<  132<H>  4.3   |  24  |  0.60    Ca    9.5      30 May 2017 07:05  Phos  3.9     05-30  Mg     1.9     05-30    TPro  7.3  /  Alb  3.9  /  TBili  0.2  /  DBili  x   /  AST  21  /  ALT  32  /  AlkPhos  93  05-30    proBNP:   Lipid Profile:   HgA1c:   TSH:     ASSESSMENT/PLAN:

## 2017-05-30 NOTE — DISCHARGE NOTE ADULT - CARE PLAN
Principal Discharge DX:	Epigastric pain  Goal:	Recovery and Follow Up  Instructions for follow-up, activity and diet:	Please follow up with Dr. Hu in 1-2 weeks. Call the office at the number below to make an appointment.   Please follow up with Dr. Navarro in 3-5 days. Call the office at the number below to schedule your endoscopy appointment.   Resume a regular diet as tolerated.  Resume all normal activities as tolerated.   Stay away from alcohol and foods that are extremely greasy.   Return to the ER or notify your physician if you experience recurrence of your symptoms. Principal Discharge DX:	Epigastric pain  Goal:	Recovery and Follow Up  Instructions for follow-up, activity and diet:	Please follow up with Dr. Hu in 1-2 weeks. Call the office at the number below to make an appointment.   Please follow up with Dr. Navarro in 3-5 days. Call the office at the number below to schedule your endoscopic biopsy appointment.   Resume a regular diet as tolerated.  Resume all normal activities as tolerated.   Stay away from alcohol and foods that are extremely greasy.   Return to the ER or notify your physician if you experience recurrence of your symptoms. Principal Discharge DX:	Epigastric pain  Goal:	Recovery and Follow Up  Instructions for follow-up, activity and diet:	Please follow up with Dr. Hu in 2 weeks after the biopsy is performed. Call the office at the number below to make an appointment.   An appointment was made for you already for endoscopic biopsy with Dr. Navarro for June 7, 2017 at 1 pm, please arrive at 12:30, do not eat anything 8 hours prior, you may arrive alone but someone must be present to take you home. The procedure will take place on the 2nd floor endoscopy suite.   Resume a regular diet as tolerated.  Resume all normal activities as tolerated.   Stay away from alcohol and foods that are extremely greasy.   Return to the ER or notify your physician if you experience recurrence of your symptoms.

## 2017-05-30 NOTE — DISCHARGE NOTE ADULT - HOSPITAL COURSE
51M w/ hx of chronic pancreatitis, EtOH abuse, MI, and spontaneous hematoma of spleen in January 2017, presented to [facility] ED w/ complaints of abdominal pain. Patient was worked up for acute on chronic pancreatitis and a CT scan showed an enlarging distal pancreatic tail mass. Patient was admitted to the general surgery service and pancreatitis was managed non-operatively. Patient's sx improved with IV hydration (including multivitamin, thiamine, folate), and patient tolerated PO challenge by HD5. GI was consulted on HD6 and recommended outpatient EUS/possible FNA bx of pancreatic mass. Patient was stable for discharge at this time with instructions to follow up as outpatient.

## 2017-05-30 NOTE — PROGRESS NOTE ADULT - ASSESSMENT
52 y/o M with chronic pancreatitis CAD, MI s/p PCI p/w distal pancreatic mass  -NPO/IVF  -pain/nausea control  -SQH/SCDS  -ISS  -AM labs  -OR 52 y/o M with chronic pancreatitis CAD, MI s/p PCI p/w distal pancreatic mass  -NPO/IVF  - f/u MRI official read  -pain/nausea control  -SQH/SCDS  -ISS  - banana bag  -AM labs  - c/s GI (Dr. Navarro) for EUS/FNA Bx

## 2017-05-30 NOTE — DISCHARGE NOTE ADULT - PATIENT PORTAL LINK FT
“You can access the [employer] Patient Portal, offered by [facility], by registering with the following website: [email or web address]”

## 2017-05-30 NOTE — PROGRESS NOTE ADULT - SUBJECTIVE AND OBJECTIVE BOX
O/N: DORIE, one episode of BP to 169/109 during which pt had just finished 3 slices of pizza and was watching a hockey game he bet on, otherwise VSS, preop'd for OR  5/29: VSS , pre op for OR tomorrow 5/30, NPO after midnight , patient reports he is feeling anxious given  0.5 of ativan , patient cleared by cardiology O/N: DORIE, one episode of BP to 169/109 during which pt had just finished 3 slices of pizza and was watching a hockey game he bet on, otherwise VSS, preop'd for OR  5/29: VSS , pre op for OR tomorrow 5/30, NPO after midnight , patient reports he is feeling anxious given  0.5 of ativan , patient cleared by cardiology       SUBJECTIVE:  Flatus: [ ] YES [ ] NO             Bowel Movement: [ ] YES [ ] NO  Pain (0-10):            Pain Control Adequate: [x ] YES [ ] NO  Nausea: [ ] YES [ x] NO            Vomiting: [ ] YES [ x] NO  Diarrhea: [ ] YES [ ] NO         Constipation: [ ] YES [ ] NO     Chest Pain: [ ] YES [x ] NO    SOB:  [ ] YES [ x] NO    MEDICATIONS  (STANDING):  heparin  Injectable 5000Unit(s) SubCutaneous every 8 hours  insulin lispro (HumaLOG) corrective regimen sliding scale  SubCutaneous Before meals and at bedtime  dextrose 5%. 1000milliLiter(s) IV Continuous <Continuous>  dextrose 50% Injectable 12.5Gram(s) IV Push once  dextrose 50% Injectable 25Gram(s) IV Push once  dextrose 50% Injectable 25Gram(s) IV Push once  diltiazem   CD 120milliGRAM(s) Oral daily  lisinopril 20milliGRAM(s) Oral daily  pantoprazole    Tablet 40milliGRAM(s) Oral before breakfast  atorvastatin 40milliGRAM(s) Oral at bedtime  multivitamin/thiamine/folic acid in sodium chloride 0.9% 1000milliLiter(s) IV Continuous <Continuous>  docusate sodium 100milliGRAM(s) Oral two times a day    MEDICATIONS  (PRN):  ondansetron Injectable 4milliGRAM(s) IV Push every 6 hours PRN Nausea  dextrose Gel 1Dose(s) Oral once PRN Blood Glucose LESS THAN 70 milliGRAM(s)/deciliter  glucagon  Injectable 1milliGRAM(s) IntraMuscular once PRN Glucose LESS THAN 70 milligrams/deciliter  HYDROmorphone   Tablet 2milliGRAM(s) Oral every 4 hours PRN Moderate Pain (4 - 6)  HYDROmorphone   Tablet 4milliGRAM(s) Oral every 4 hours PRN Severe Pain (7 - 10)      Vital Signs Last 24 Hrs  T(C): 36.2, Max: 37.5 (05-29 @ 17:03)  T(F): 97.2, Max: 99.5 (05-29 @ 17:03)  HR: 85 (81 - 95)  BP: 166/114 (149/106 - 169/109)  BP(mean): --  RR: 16 (16 - 16)  SpO2: 98% (95% - 98%)    Physical Exam:  General: NAD  Pulmonary: Nonlabored breathing, no respiratory distress  Cardiovascular: NSR  Abdominal: soft, NT/ND, no organomegaly, obese  Extremities: WWP, normal strength, no clubbing/cyanosis/edema  Neuro: A/O x3, no focal deficits, normal sensation  Pulses: palpable distal pulses    Lines/drains/tubes:    I&O's Summary    I & Os for current day (as of 30 May 2017 07:08)  =============================================  IN: 2500 ml / OUT: 2400 ml / NET: 100 ml      LABS:                        12.2   8.6   )-----------( 208      ( 29 May 2017 06:36 )             36.8     05-29    136  |  98  |  10  ----------------------------<  161<H>  4.1   |  23  |  0.70    Ca    9.2      29 May 2017 06:36  Phos  3.9     05-29  Mg     1.8     05-29          CAPILLARY BLOOD GLUCOSE  195 (29 May 2017 21:43)  165 (29 May 2017 17:03)  176 (29 May 2017 11:21)  105 (29 May 2017 08:06)        RADIOLOGY & ADDITIONAL STUDIES:

## 2017-05-30 NOTE — DISCHARGE NOTE ADULT - MEDICATION SUMMARY - MEDICATIONS TO TAKE
I will START or STAY ON the medications listed below when I get home from the hospital:    lisinopril 20 mg oral tablet  -- 1 tab(s) by mouth once a day  -- Indication: For HTN    dilTIAZem 120 mg/24 hours oral capsule, extended release  -- 1 cap(s) by mouth once a day  -- Indication: For HTN    atorvastatin 40 mg oral tablet  -- 1 tab(s) by mouth once a day (at bedtime)  -- Indication: For HLDA

## 2017-05-31 VITALS
RESPIRATION RATE: 16 BRPM | HEART RATE: 90 BPM | TEMPERATURE: 98 F | OXYGEN SATURATION: 96 % | DIASTOLIC BLOOD PRESSURE: 95 MMHG | SYSTOLIC BLOOD PRESSURE: 120 MMHG

## 2017-05-31 PROCEDURE — 82553 CREATINE MB FRACTION: CPT

## 2017-05-31 PROCEDURE — 85027 COMPLETE CBC AUTOMATED: CPT

## 2017-05-31 PROCEDURE — 93005 ELECTROCARDIOGRAM TRACING: CPT

## 2017-05-31 PROCEDURE — 80048 BASIC METABOLIC PNL TOTAL CA: CPT

## 2017-05-31 PROCEDURE — 83036 HEMOGLOBIN GLYCOSYLATED A1C: CPT

## 2017-05-31 PROCEDURE — 85025 COMPLETE CBC W/AUTO DIFF WBC: CPT

## 2017-05-31 PROCEDURE — 90732 PPSV23 VACC 2 YRS+ SUBQ/IM: CPT

## 2017-05-31 PROCEDURE — 84484 ASSAY OF TROPONIN QUANT: CPT

## 2017-05-31 PROCEDURE — 86304 IMMUNOASSAY TUMOR CA 125: CPT

## 2017-05-31 PROCEDURE — 71045 X-RAY EXAM CHEST 1 VIEW: CPT

## 2017-05-31 PROCEDURE — 87086 URINE CULTURE/COLONY COUNT: CPT

## 2017-05-31 PROCEDURE — 82150 ASSAY OF AMYLASE: CPT

## 2017-05-31 PROCEDURE — 85730 THROMBOPLASTIN TIME PARTIAL: CPT

## 2017-05-31 PROCEDURE — 81003 URINALYSIS AUTO W/O SCOPE: CPT

## 2017-05-31 PROCEDURE — 90647 HIB PRP-OMP VACC 3 DOSE IM: CPT

## 2017-05-31 PROCEDURE — 83690 ASSAY OF LIPASE: CPT

## 2017-05-31 PROCEDURE — 74177 CT ABD & PELVIS W/CONTRAST: CPT

## 2017-05-31 PROCEDURE — 86900 BLOOD TYPING SEROLOGIC ABO: CPT

## 2017-05-31 PROCEDURE — 84100 ASSAY OF PHOSPHORUS: CPT

## 2017-05-31 PROCEDURE — 85610 PROTHROMBIN TIME: CPT

## 2017-05-31 PROCEDURE — A9585: CPT

## 2017-05-31 PROCEDURE — 74182 MRI ABDOMEN W/CONTRAST: CPT

## 2017-05-31 PROCEDURE — 96376 TX/PRO/DX INJ SAME DRUG ADON: CPT | Mod: XU

## 2017-05-31 PROCEDURE — 36415 COLL VENOUS BLD VENIPUNCTURE: CPT

## 2017-05-31 PROCEDURE — 86300 IMMUNOASSAY TUMOR CA 15-3: CPT

## 2017-05-31 PROCEDURE — 96374 THER/PROPH/DIAG INJ IV PUSH: CPT | Mod: XU

## 2017-05-31 PROCEDURE — 99285 EMERGENCY DEPT VISIT HI MDM: CPT | Mod: 25

## 2017-05-31 PROCEDURE — 86301 IMMUNOASSAY TUMOR CA 19-9: CPT

## 2017-05-31 PROCEDURE — 86850 RBC ANTIBODY SCREEN: CPT

## 2017-05-31 PROCEDURE — 83735 ASSAY OF MAGNESIUM: CPT

## 2017-05-31 PROCEDURE — 90733 MPSV4 VACCINE SUBQ: CPT

## 2017-05-31 PROCEDURE — 80053 COMPREHEN METABOLIC PANEL: CPT

## 2017-05-31 PROCEDURE — 86901 BLOOD TYPING SEROLOGIC RH(D): CPT

## 2017-05-31 PROCEDURE — 82550 ASSAY OF CK (CPK): CPT

## 2017-05-31 RX ORDER — IBUPROFEN 200 MG
1 TABLET ORAL
Qty: 42 | Refills: 0
Start: 2017-05-31 | End: 2017-06-14

## 2017-05-31 RX ADMIN — LISINOPRIL 20 MILLIGRAM(S): 2.5 TABLET ORAL at 05:50

## 2017-05-31 RX ADMIN — Medication 120 MILLIGRAM(S): at 05:51

## 2017-05-31 RX ADMIN — Medication 2 MILLIGRAM(S): at 05:50

## 2017-05-31 RX ADMIN — PANTOPRAZOLE SODIUM 40 MILLIGRAM(S): 20 TABLET, DELAYED RELEASE ORAL at 05:50

## 2017-05-31 NOTE — PROGRESS NOTE ADULT - PROBLEM SELECTOR PLAN 4
Pancreatic mass in a smoker
Pancreatic mass in a smoker
Pancreatic mass in a smoker.  Surgery postponed.
Pancreatic mass in a smoker

## 2017-05-31 NOTE — PROGRESS NOTE ADULT - SUBJECTIVE AND OBJECTIVE BOX
O/N: pt c/o pain/nausea, pt had episode of emesis, did not tolerate lunch/dinner, put back on CLD and IVF, BP remains high  5/30: Recieved vaccines, patient will get biospy outpatient. Ok to be discharged. O/N: pt c/o pain/nausea, pt had episode of emesis, did not tolerate lunch/dinner, put back on CLD and IVF  5/30: Recieved vaccines, patient will get biospy outpatient. Ok to be discharged. O/N: pt c/o pain/nausea, pt had episode of emesis, did not tolerate lunch/dinner, put back on CLD and IVF  5/30: Recieved vaccines, patient will get biospy outpatient. Ok to be discharged.      SUBJECTIVE:  Patient seen and examined at bedside with surgery chief resident on AM rounds. Pt w/ no complaints at this time. Tolerating diet. +Bowel fx. states he is ready to go home.     MEDICATIONS  (STANDING):  heparin  Injectable 5000Unit(s) SubCutaneous every 8 hours  insulin lispro (HumaLOG) corrective regimen sliding scale  SubCutaneous Before meals and at bedtime  dextrose 5%. 1000milliLiter(s) IV Continuous <Continuous>  dextrose 50% Injectable 12.5Gram(s) IV Push once  dextrose 50% Injectable 25Gram(s) IV Push once  dextrose 50% Injectable 25Gram(s) IV Push once  diltiazem   CD 120milliGRAM(s) Oral daily  lisinopril 20milliGRAM(s) Oral daily  pantoprazole    Tablet 40milliGRAM(s) Oral before breakfast  atorvastatin 40milliGRAM(s) Oral at bedtime  multivitamin/thiamine/folic acid in sodium chloride 0.9% 1000milliLiter(s) IV Continuous <Continuous>  docusate sodium 100milliGRAM(s) Oral two times a day  nicotine  Polacrilex Gum 2milliGRAM(s) Oral three times a day  sodium chloride 0.9%. 1000milliLiter(s) IV Continuous <Continuous>    MEDICATIONS  (PRN):  ondansetron Injectable 4milliGRAM(s) IV Push every 6 hours PRN Nausea  dextrose Gel 1Dose(s) Oral once PRN Blood Glucose LESS THAN 70 milliGRAM(s)/deciliter  glucagon  Injectable 1milliGRAM(s) IntraMuscular once PRN Glucose LESS THAN 70 milligrams/deciliter  acetaminophen   Tablet. 650milliGRAM(s) Oral every 6 hours PRN Mild Pain (1 - 3)  ibuprofen  Tablet 600milliGRAM(s) Oral every 6 hours PRN moderate pain  metoclopramide Injectable 10milliGRAM(s) IV Push once PRN nausea  diphenhydrAMINE   Injectable 25milliGRAM(s) IV Push at bedtime PRN Insomnia      Vital Signs Last 24 Hrs  T(C): 36.7, Max: 37 (05-30 @ 23:00)  T(F): 98, Max: 98.6 (05-30 @ 23:00)  HR: 90 (67 - 90)  BP: 120/95 (114/72 - 167/118)  BP(mean): --  RR: 16 (16 - 18)  SpO2: 96% (94% - 99%)    PHYSICAL EXAM:      Constitutional: A&Ox3    Respiratory: non labored breathing, no respiratory distress    Gastrointestinal: Soft, ND, MTTP          Genitourinary: voids                      I&O's Detail  I & Os for 24h ending 30 May 2017 07:00  =============================================  IN:    Oral Fluid: 1340 ml    multivitamin/thiamine/folic acid in sodium chloride 0.9%: 1160 ml    Total IN: 2500 ml  ---------------------------------------------  OUT:    Voided: 2400 ml    Total OUT: 2400 ml  ---------------------------------------------  Total NET: 100 ml    I & Os for current day (as of 31 May 2017 06:46)  =============================================  IN:    Total IN: 0 ml  ---------------------------------------------  OUT:    Voided: 1400 ml    Total OUT: 1400 ml  ---------------------------------------------  Total NET: -1400 ml      LABS:    05-30    137  |  100  |  11  ----------------------------<  132<H>  4.3   |  24  |  0.60    Ca    9.5      30 May 2017 07:05  Phos  3.9     05-30  Mg     1.9     05-30    TPro  7.3  /  Alb  3.9  /  TBili  0.2  /  DBili  x   /  AST  21  /  ALT  32  /  AlkPhos  93  05-30    PT/INR - ( 30 May 2017 07:05 )   PT: 11.8 sec;   INR: 1.06          PTT - ( 30 May 2017 07:05 )  PTT:33.4 sec      RADIOLOGY & ADDITIONAL STUDIES:

## 2017-05-31 NOTE — PROGRESS NOTE ADULT - PROBLEM SELECTOR PLAN 2
An active issue
An active issue
An active issue.  Discussed with pt today prior to DC.  Use of Nicotene substitute urged.
An active issue

## 2017-05-31 NOTE — PROGRESS NOTE ADULT - SUBJECTIVE AND OBJECTIVE BOX
INTERVAL HISTORY:  surgery postponed  	  MEDICATIONS:  diltiazem   CD 120milliGRAM(s) Oral daily  lisinopril 20milliGRAM(s) Oral daily      diphenhydrAMINE   Injectable 25milliGRAM(s) IV Push at bedtime PRN    ondansetron Injectable 4milliGRAM(s) IV Push every 6 hours PRN  acetaminophen   Tablet. 650milliGRAM(s) Oral every 6 hours PRN  ibuprofen  Tablet 600milliGRAM(s) Oral every 6 hours PRN  metoclopramide Injectable 10milliGRAM(s) IV Push once PRN    pantoprazole    Tablet 40milliGRAM(s) Oral before breakfast  docusate sodium 100milliGRAM(s) Oral two times a day    insulin lispro (HumaLOG) corrective regimen sliding scale  SubCutaneous Before meals and at bedtime  dextrose Gel 1Dose(s) Oral once PRN  dextrose 50% Injectable 12.5Gram(s) IV Push once  dextrose 50% Injectable 25Gram(s) IV Push once  dextrose 50% Injectable 25Gram(s) IV Push once  glucagon  Injectable 1milliGRAM(s) IntraMuscular once PRN  atorvastatin 40milliGRAM(s) Oral at bedtime    heparin  Injectable 5000Unit(s) SubCutaneous every 8 hours  dextrose 5%. 1000milliLiter(s) IV Continuous <Continuous>  multivitamin/thiamine/folic acid in sodium chloride 0.9% 1000milliLiter(s) IV Continuous <Continuous>  sodium chloride 0.9%. 1000milliLiter(s) IV Continuous <Continuous>        PHYSICAL EXAM:  T(C): 36.7, Max: 37 (05-30 @ 23:00)  HR: 90 (67 - 90)  BP: 120/95 (114/72 - 167/118)  RR: 16 (16 - 18)  SpO2: 96% (94% - 99%)  Wt(kg): --  I&O's Summary    I & Os for current day (as of 31 May 2017 07:32)  =============================================  IN: 0 ml / OUT: 1400 ml / NET: -1400 ml    Height (cm): 185.4 (05-30 @ 08:15)  Weight (kg): 105 (05-30 @ 08:15)  BMI (kg/m2): 30.5 (05-30 @ 08:15)  BSA (m2): 2.29 (05-30 @ 08:15)    Appearance: Normal	  HEENT:   Normal oral mucosa, PERRL, EOMI	  Lymphatic: No lymphadenopathy  Cardiovascular: Normal S1 S2, No JVD, No murmurs, No edema  Respiratory: Lungs clear to auscultation	  Psychiatry: A & O x 3, Mood & affect appropriate  Gastrointestinal:  Soft, Non-tender, + BS	  Skin: No rashes, No ecchymoses, No cyanosis  Neurologic: Non-focal  Extremities: Normal range of motion, No clubbing, cyanosis or edema  Vascular: Peripheral pulses palpable 2+ bilaterally    TELEMETRY: 	    ECG:  	  RADIOLOGY:   DIAGNOSTIC TESTING:  [ ] Echocardiogram:  [ ]  Catheterization:  [ ] Stress Test:    OTHER: 	    LABS:	 	    CARDIAC MARKERS:              05-30    137  |  100  |  11  ----------------------------<  132<H>  4.3   |  24  |  0.60    Ca    9.5      30 May 2017 07:05  Phos  3.9     05-30  Mg     1.9     05-30    TPro  7.3  /  Alb  3.9  /  TBili  0.2  /  DBili  x   /  AST  21  /  ALT  32  /  AlkPhos  93  05-30    proBNP:   Lipid Profile:   HgA1c:   TSH:     ASSESSMENT/PLAN:

## 2017-05-31 NOTE — PROGRESS NOTE ADULT - PROVIDER SPECIALTY LIST ADULT
Cardiology
Surgery
Thoracic Surgery
Surgery

## 2017-05-31 NOTE — PROGRESS NOTE ADULT - ASSESSMENT
50 y/o M with chronic pancreatitis CAD, MI s/p PCI p/w distal pancreatic mass  -CLD  -IVF  -pain/nausea control  -SQH/SCDS  -ISS  -AM labs 52 y/o M with chronic pancreatitis CAD, MI s/p PCI p/w distal pancreatic mass    -Reg diet  -pain/nausea control  -SQH/SCDS  -ISS  - outpatient f/u

## 2017-05-31 NOTE — PROGRESS NOTE ADULT - PROBLEM SELECTOR PLAN 1
Remote stent. No angina.  Pt is cleared to proceed with endoscopy/ERCP and surgery. Continue statin.  He is a moderate risk pt going for moderate risk surgery
Remote stent. No angina.  Pt is cleared to proceed with endoscopy/ERCP and surgery. Continue statin

## 2017-05-31 NOTE — PROGRESS NOTE ADULT - PROBLEM SELECTOR PROBLEM 1
Atherosclerosis of coronary artery

## 2017-06-02 DIAGNOSIS — R10.13 EPIGASTRIC PAIN: ICD-10-CM

## 2017-06-02 DIAGNOSIS — F10.10 ALCOHOL ABUSE, UNCOMPLICATED: ICD-10-CM

## 2017-06-02 DIAGNOSIS — I25.2 OLD MYOCARDIAL INFARCTION: ICD-10-CM

## 2017-06-02 DIAGNOSIS — Z86.711 PERSONAL HISTORY OF PULMONARY EMBOLISM: ICD-10-CM

## 2017-06-02 DIAGNOSIS — F17.210 NICOTINE DEPENDENCE, CIGARETTES, UNCOMPLICATED: ICD-10-CM

## 2017-06-02 DIAGNOSIS — K86.0 ALCOHOL-INDUCED CHRONIC PANCREATITIS: ICD-10-CM

## 2017-06-02 DIAGNOSIS — I25.10 ATHEROSCLEROTIC HEART DISEASE OF NATIVE CORONARY ARTERY WITHOUT ANGINA PECTORIS: ICD-10-CM

## 2017-06-02 DIAGNOSIS — Z95.5 PRESENCE OF CORONARY ANGIOPLASTY IMPLANT AND GRAFT: ICD-10-CM

## 2017-06-02 DIAGNOSIS — K86.89 OTHER SPECIFIED DISEASES OF PANCREAS: ICD-10-CM

## 2017-06-02 DIAGNOSIS — I10 ESSENTIAL (PRIMARY) HYPERTENSION: ICD-10-CM

## 2017-06-02 DIAGNOSIS — D73.4 CYST OF SPLEEN: ICD-10-CM

## 2017-06-05 ENCOUNTER — APPOINTMENT (OUTPATIENT)
Dept: HEMATOLOGY ONCOLOGY | Facility: CLINIC | Age: 52
End: 2017-06-05

## 2017-06-07 ENCOUNTER — RECORD ABSTRACTING (OUTPATIENT)
Age: 52
End: 2017-06-07

## 2017-06-07 DIAGNOSIS — F15.90 OTHER STIMULANT USE, UNSPECIFIED, UNCOMPLICATED: ICD-10-CM

## 2017-06-07 DIAGNOSIS — Z83.3 FAMILY HISTORY OF DIABETES MELLITUS: ICD-10-CM

## 2017-06-07 DIAGNOSIS — Z82.49 FAMILY HISTORY OF ISCHEMIC HEART DISEASE AND OTHER DISEASES OF THE CIRCULATORY SYSTEM: ICD-10-CM

## 2017-06-07 DIAGNOSIS — E78.5 HYPERLIPIDEMIA, UNSPECIFIED: ICD-10-CM

## 2017-06-07 DIAGNOSIS — Z72.3 LACK OF PHYSICAL EXERCISE: ICD-10-CM

## 2017-06-14 ENCOUNTER — APPOINTMENT (OUTPATIENT)
Dept: GASTROENTEROLOGY | Facility: HOSPITAL | Age: 52
End: 2017-06-14

## 2017-06-14 ENCOUNTER — OUTPATIENT (OUTPATIENT)
Dept: OUTPATIENT SERVICES | Facility: HOSPITAL | Age: 52
LOS: 1 days | Discharge: HOME | End: 2017-06-14
Payer: MEDICAID

## 2017-06-14 PROCEDURE — 43259 EGD US EXAM DUODENUM/JEJUNUM: CPT

## 2017-06-19 ENCOUNTER — RX RENEWAL (OUTPATIENT)
Age: 52
End: 2017-06-19

## 2017-06-19 DIAGNOSIS — I10 ESSENTIAL (PRIMARY) HYPERTENSION: ICD-10-CM

## 2017-06-19 DIAGNOSIS — D73.89 OTHER DISEASES OF SPLEEN: ICD-10-CM

## 2017-06-19 DIAGNOSIS — I25.10 ATHEROSCLEROTIC HEART DISEASE OF NATIVE CORONARY ARTERY WITHOUT ANGINA PECTORIS: ICD-10-CM

## 2017-06-19 DIAGNOSIS — E78.5 HYPERLIPIDEMIA, UNSPECIFIED: ICD-10-CM

## 2017-06-19 DIAGNOSIS — Z86.711 PERSONAL HISTORY OF PULMONARY EMBOLISM: ICD-10-CM

## 2017-06-19 DIAGNOSIS — K21.9 GASTRO-ESOPHAGEAL REFLUX DISEASE WITHOUT ESOPHAGITIS: ICD-10-CM

## 2017-06-19 DIAGNOSIS — R93.3 ABNORMAL FINDINGS ON DIAGNOSTIC IMAGING OF OTHER PARTS OF DIGESTIVE TRACT: ICD-10-CM

## 2017-06-26 ENCOUNTER — APPOINTMENT (OUTPATIENT)
Dept: GASTROENTEROLOGY | Facility: CLINIC | Age: 52
End: 2017-06-26

## 2017-07-05 ENCOUNTER — OUTPATIENT (OUTPATIENT)
Dept: OUTPATIENT SERVICES | Facility: HOSPITAL | Age: 52
LOS: 1 days | End: 2017-07-05
Payer: MEDICAID

## 2017-07-05 PROCEDURE — 74178 CT ABD&PLV WO CNTR FLWD CNTR: CPT | Mod: 26

## 2017-07-05 PROCEDURE — 74178 CT ABD&PLV WO CNTR FLWD CNTR: CPT

## 2017-07-18 ENCOUNTER — APPOINTMENT (OUTPATIENT)
Dept: HEMATOLOGY ONCOLOGY | Facility: CLINIC | Age: 52
End: 2017-07-18

## 2017-08-01 ENCOUNTER — RX RENEWAL (OUTPATIENT)
Age: 52
End: 2017-08-01

## 2017-08-24 ENCOUNTER — APPOINTMENT (OUTPATIENT)
Dept: HEART AND VASCULAR | Facility: CLINIC | Age: 52
End: 2017-08-24

## 2017-08-31 ENCOUNTER — APPOINTMENT (OUTPATIENT)
Dept: HEMATOLOGY ONCOLOGY | Facility: CLINIC | Age: 52
End: 2017-08-31

## 2017-09-20 ENCOUNTER — RX RENEWAL (OUTPATIENT)
Age: 52
End: 2017-09-20

## 2017-10-12 ENCOUNTER — APPOINTMENT (OUTPATIENT)
Dept: HEART AND VASCULAR | Facility: CLINIC | Age: 52
End: 2017-10-12
Payer: MEDICAID

## 2017-10-12 VITALS
WEIGHT: 221 LBS | HEART RATE: 94 BPM | SYSTOLIC BLOOD PRESSURE: 136 MMHG | OXYGEN SATURATION: 98 % | DIASTOLIC BLOOD PRESSURE: 76 MMHG | BODY MASS INDEX: 30.6 KG/M2 | TEMPERATURE: 98.1 F | HEIGHT: 71.2 IN

## 2017-10-12 DIAGNOSIS — I71.9 AORTIC ANEURYSM OF UNSPECIFIED SITE, W/OUT RUPTURE: ICD-10-CM

## 2017-10-12 PROCEDURE — 93000 ELECTROCARDIOGRAM COMPLETE: CPT

## 2017-10-12 PROCEDURE — 99214 OFFICE O/P EST MOD 30 MIN: CPT | Mod: 25

## 2017-10-12 RX ORDER — WARFARIN 5 MG/1
5 TABLET ORAL
Qty: 60 | Refills: 5 | Status: DISCONTINUED | COMMUNITY
End: 2017-10-12

## 2017-10-27 NOTE — ED PROVIDER NOTE - NS ED ATTENDING STATEMENT MOD
negative
I have personally performed a face to face diagnostic evaluation on this patient. I have reviewed the PA note and agree with the history, exam, and plan of care, except as noted.

## 2017-10-31 ENCOUNTER — APPOINTMENT (OUTPATIENT)
Dept: INTERNAL MEDICINE | Facility: CLINIC | Age: 52
End: 2017-10-31

## 2017-11-08 ENCOUNTER — RX RENEWAL (OUTPATIENT)
Age: 52
End: 2017-11-08

## 2017-11-21 ENCOUNTER — APPOINTMENT (OUTPATIENT)
Dept: INTERNAL MEDICINE | Facility: CLINIC | Age: 52
End: 2017-11-21

## 2017-11-29 ENCOUNTER — INPATIENT (INPATIENT)
Facility: HOSPITAL | Age: 52
LOS: 1 days | Discharge: HOME | DRG: 815 | End: 2017-12-01
Attending: SURGERY | Admitting: SURGERY
Payer: MEDICARE

## 2017-11-29 VITALS
WEIGHT: 214.95 LBS | HEART RATE: 95 BPM | TEMPERATURE: 98 F | DIASTOLIC BLOOD PRESSURE: 127 MMHG | RESPIRATION RATE: 20 BRPM | OXYGEN SATURATION: 97 % | SYSTOLIC BLOOD PRESSURE: 199 MMHG

## 2017-11-29 LAB
ALBUMIN SERPL ELPH-MCNC: 4.2 G/DL — SIGNIFICANT CHANGE UP (ref 3.3–5)
ALP SERPL-CCNC: 109 U/L — SIGNIFICANT CHANGE UP (ref 40–120)
ALT FLD-CCNC: 33 U/L — SIGNIFICANT CHANGE UP (ref 10–45)
ANION GAP SERPL CALC-SCNC: 18 MMOL/L — HIGH (ref 5–17)
APPEARANCE UR: CLEAR — SIGNIFICANT CHANGE UP
APTT BLD: 26.5 SEC — LOW (ref 27.5–37.4)
AST SERPL-CCNC: 26 U/L — SIGNIFICANT CHANGE UP (ref 10–40)
BASOPHILS NFR BLD AUTO: 0.5 % — SIGNIFICANT CHANGE UP (ref 0–2)
BILIRUB SERPL-MCNC: 0.6 MG/DL — SIGNIFICANT CHANGE UP (ref 0.2–1.2)
BILIRUB UR-MCNC: NEGATIVE — SIGNIFICANT CHANGE UP
BUN SERPL-MCNC: 11 MG/DL — SIGNIFICANT CHANGE UP (ref 7–23)
CALCIUM SERPL-MCNC: 9.8 MG/DL — SIGNIFICANT CHANGE UP (ref 8.4–10.5)
CHLORIDE SERPL-SCNC: 95 MMOL/L — LOW (ref 96–108)
CK MB CFR SERPL CALC: 1.7 NG/ML — SIGNIFICANT CHANGE UP (ref 0–6.7)
CO2 SERPL-SCNC: 23 MMOL/L — SIGNIFICANT CHANGE UP (ref 22–31)
COLOR SPEC: YELLOW — SIGNIFICANT CHANGE UP
CREAT SERPL-MCNC: 0.77 MG/DL — SIGNIFICANT CHANGE UP (ref 0.5–1.3)
DIFF PNL FLD: NEGATIVE — SIGNIFICANT CHANGE UP
EOSINOPHIL NFR BLD AUTO: 2.2 % — SIGNIFICANT CHANGE UP (ref 0–6)
GLUCOSE SERPL-MCNC: 155 MG/DL — HIGH (ref 70–99)
GLUCOSE UR QL: NEGATIVE — SIGNIFICANT CHANGE UP
HCT VFR BLD CALC: 37.8 % — LOW (ref 39–50)
HCT VFR BLD CALC: 39.7 % — SIGNIFICANT CHANGE UP (ref 39–50)
HGB BLD-MCNC: 12.6 G/DL — LOW (ref 13–17)
HGB BLD-MCNC: 13.5 G/DL — SIGNIFICANT CHANGE UP (ref 13–17)
INR BLD: 1.04 — SIGNIFICANT CHANGE UP (ref 0.88–1.16)
KETONES UR-MCNC: NEGATIVE — SIGNIFICANT CHANGE UP
LACTATE SERPL-SCNC: 1.5 MMOL/L — SIGNIFICANT CHANGE UP (ref 0.5–2)
LEUKOCYTE ESTERASE UR-ACNC: (no result)
LYMPHOCYTES # BLD AUTO: 19.1 % — SIGNIFICANT CHANGE UP (ref 13–44)
MCHC RBC-ENTMCNC: 31.1 PG — SIGNIFICANT CHANGE UP (ref 27–34)
MCHC RBC-ENTMCNC: 31.6 PG — SIGNIFICANT CHANGE UP (ref 27–34)
MCHC RBC-ENTMCNC: 33.3 G/DL — SIGNIFICANT CHANGE UP (ref 32–36)
MCHC RBC-ENTMCNC: 34 G/DL — SIGNIFICANT CHANGE UP (ref 32–36)
MCV RBC AUTO: 93 FL — SIGNIFICANT CHANGE UP (ref 80–100)
MCV RBC AUTO: 93.3 FL — SIGNIFICANT CHANGE UP (ref 80–100)
MONOCYTES NFR BLD AUTO: 9.4 % — SIGNIFICANT CHANGE UP (ref 2–14)
NEUTROPHILS NFR BLD AUTO: 68.8 % — SIGNIFICANT CHANGE UP (ref 43–77)
NITRITE UR-MCNC: NEGATIVE — SIGNIFICANT CHANGE UP
NT-PROBNP SERPL-SCNC: 20 PG/ML — SIGNIFICANT CHANGE UP (ref 0–300)
PCP SPEC-MCNC: SIGNIFICANT CHANGE UP
PH UR: 5.5 — SIGNIFICANT CHANGE UP (ref 5–8)
PLATELET # BLD AUTO: 180 K/UL — SIGNIFICANT CHANGE UP (ref 150–400)
PLATELET # BLD AUTO: 190 K/UL — SIGNIFICANT CHANGE UP (ref 150–400)
POTASSIUM SERPL-MCNC: 3.9 MMOL/L — SIGNIFICANT CHANGE UP (ref 3.5–5.3)
POTASSIUM SERPL-SCNC: 3.9 MMOL/L — SIGNIFICANT CHANGE UP (ref 3.5–5.3)
PROT SERPL-MCNC: 7.7 G/DL — SIGNIFICANT CHANGE UP (ref 6–8.3)
PROT UR-MCNC: NEGATIVE MG/DL — SIGNIFICANT CHANGE UP
PROTHROM AB SERPL-ACNC: 11.5 SEC — SIGNIFICANT CHANGE UP (ref 9.8–12.7)
RBC # BLD: 4.05 M/UL — LOW (ref 4.2–5.8)
RBC # BLD: 4.27 M/UL — SIGNIFICANT CHANGE UP (ref 4.2–5.8)
RBC # FLD: 12.9 % — SIGNIFICANT CHANGE UP (ref 10.3–16.9)
RBC # FLD: 13 % — SIGNIFICANT CHANGE UP (ref 10.3–16.9)
SODIUM SERPL-SCNC: 136 MMOL/L — SIGNIFICANT CHANGE UP (ref 135–145)
SP GR SPEC: 1.02 — SIGNIFICANT CHANGE UP (ref 1–1.03)
TROPONIN T SERPL-MCNC: <0.01 NG/ML — SIGNIFICANT CHANGE UP (ref 0–0.01)
UROBILINOGEN FLD QL: 0.2 E.U./DL — SIGNIFICANT CHANGE UP
WBC # BLD: 11.6 K/UL — HIGH (ref 3.8–10.5)
WBC # BLD: 12.2 K/UL — HIGH (ref 3.8–10.5)
WBC # FLD AUTO: 11.6 K/UL — HIGH (ref 3.8–10.5)
WBC # FLD AUTO: 12.2 K/UL — HIGH (ref 3.8–10.5)

## 2017-11-29 PROCEDURE — 93010 ELECTROCARDIOGRAM REPORT: CPT

## 2017-11-29 PROCEDURE — 74174 CTA ABD&PLVS W/CONTRAST: CPT | Mod: 26

## 2017-11-29 PROCEDURE — 99285 EMERGENCY DEPT VISIT HI MDM: CPT | Mod: 25

## 2017-11-29 PROCEDURE — 71010: CPT | Mod: 26

## 2017-11-29 RX ORDER — HYDROMORPHONE HYDROCHLORIDE 2 MG/ML
1 INJECTION INTRAMUSCULAR; INTRAVENOUS; SUBCUTANEOUS EVERY 4 HOURS
Qty: 0 | Refills: 0 | Status: DISCONTINUED | OUTPATIENT
Start: 2017-11-29 | End: 2017-12-01

## 2017-11-29 RX ORDER — ATORVASTATIN CALCIUM 80 MG/1
40 TABLET, FILM COATED ORAL AT BEDTIME
Qty: 0 | Refills: 0 | Status: DISCONTINUED | OUTPATIENT
Start: 2017-11-29 | End: 2017-12-01

## 2017-11-29 RX ORDER — MORPHINE SULFATE 50 MG/1
4 CAPSULE, EXTENDED RELEASE ORAL ONCE
Qty: 0 | Refills: 0 | Status: DISCONTINUED | OUTPATIENT
Start: 2017-11-29 | End: 2017-11-29

## 2017-11-29 RX ORDER — MORPHINE SULFATE 50 MG/1
2 CAPSULE, EXTENDED RELEASE ORAL ONCE
Qty: 0 | Refills: 0 | Status: DISCONTINUED | OUTPATIENT
Start: 2017-11-29 | End: 2017-11-29

## 2017-11-29 RX ORDER — HYDROMORPHONE HYDROCHLORIDE 2 MG/ML
0.5 INJECTION INTRAMUSCULAR; INTRAVENOUS; SUBCUTANEOUS EVERY 4 HOURS
Qty: 0 | Refills: 0 | Status: DISCONTINUED | OUTPATIENT
Start: 2017-11-29 | End: 2017-12-01

## 2017-11-29 RX ORDER — LISINOPRIL 2.5 MG/1
20 TABLET ORAL DAILY
Qty: 0 | Refills: 0 | Status: DISCONTINUED | OUTPATIENT
Start: 2017-11-29 | End: 2017-12-01

## 2017-11-29 RX ORDER — SODIUM CHLORIDE 9 MG/ML
1000 INJECTION INTRAMUSCULAR; INTRAVENOUS; SUBCUTANEOUS ONCE
Qty: 0 | Refills: 0 | Status: COMPLETED | OUTPATIENT
Start: 2017-11-29 | End: 2017-11-29

## 2017-11-29 RX ORDER — ASPIRIN/CALCIUM CARB/MAGNESIUM 324 MG
325 TABLET ORAL ONCE
Qty: 0 | Refills: 0 | Status: DISCONTINUED | OUTPATIENT
Start: 2017-11-29 | End: 2017-11-29

## 2017-11-29 RX ORDER — SODIUM CHLORIDE 9 MG/ML
1000 INJECTION, SOLUTION INTRAVENOUS
Qty: 0 | Refills: 0 | Status: DISCONTINUED | OUTPATIENT
Start: 2017-11-29 | End: 2017-12-01

## 2017-11-29 RX ORDER — DILTIAZEM HCL 120 MG
120 CAPSULE, EXT RELEASE 24 HR ORAL DAILY
Qty: 0 | Refills: 0 | Status: DISCONTINUED | OUTPATIENT
Start: 2017-11-29 | End: 2017-12-01

## 2017-11-29 RX ORDER — ONDANSETRON 8 MG/1
4 TABLET, FILM COATED ORAL EVERY 6 HOURS
Qty: 0 | Refills: 0 | Status: DISCONTINUED | OUTPATIENT
Start: 2017-11-29 | End: 2017-12-01

## 2017-11-29 RX ORDER — HYDRALAZINE HCL 50 MG
10 TABLET ORAL ONCE
Qty: 0 | Refills: 0 | Status: COMPLETED | OUTPATIENT
Start: 2017-11-29 | End: 2017-11-29

## 2017-11-29 RX ADMIN — HYDROMORPHONE HYDROCHLORIDE 1 MILLIGRAM(S): 2 INJECTION INTRAMUSCULAR; INTRAVENOUS; SUBCUTANEOUS at 17:04

## 2017-11-29 RX ADMIN — ATORVASTATIN CALCIUM 40 MILLIGRAM(S): 80 TABLET, FILM COATED ORAL at 22:38

## 2017-11-29 RX ADMIN — MORPHINE SULFATE 4 MILLIGRAM(S): 50 CAPSULE, EXTENDED RELEASE ORAL at 12:59

## 2017-11-29 RX ADMIN — Medication 120 MILLIGRAM(S): at 17:09

## 2017-11-29 RX ADMIN — SODIUM CHLORIDE 110 MILLILITER(S): 9 INJECTION, SOLUTION INTRAVENOUS at 21:49

## 2017-11-29 RX ADMIN — MORPHINE SULFATE 4 MILLIGRAM(S): 50 CAPSULE, EXTENDED RELEASE ORAL at 11:44

## 2017-11-29 RX ADMIN — LISINOPRIL 20 MILLIGRAM(S): 2.5 TABLET ORAL at 17:09

## 2017-11-29 RX ADMIN — MORPHINE SULFATE 4 MILLIGRAM(S): 50 CAPSULE, EXTENDED RELEASE ORAL at 13:30

## 2017-11-29 RX ADMIN — MORPHINE SULFATE 4 MILLIGRAM(S): 50 CAPSULE, EXTENDED RELEASE ORAL at 12:00

## 2017-11-29 RX ADMIN — Medication 10 MILLIGRAM(S): at 11:45

## 2017-11-29 RX ADMIN — SODIUM CHLORIDE 110 MILLILITER(S): 9 INJECTION, SOLUTION INTRAVENOUS at 16:50

## 2017-11-29 RX ADMIN — MORPHINE SULFATE 2 MILLIGRAM(S): 50 CAPSULE, EXTENDED RELEASE ORAL at 22:38

## 2017-11-29 RX ADMIN — SODIUM CHLORIDE 2000 MILLILITER(S): 9 INJECTION INTRAMUSCULAR; INTRAVENOUS; SUBCUTANEOUS at 11:44

## 2017-11-29 RX ADMIN — HYDROMORPHONE HYDROCHLORIDE 1 MILLIGRAM(S): 2 INJECTION INTRAMUSCULAR; INTRAVENOUS; SUBCUTANEOUS at 16:49

## 2017-11-29 RX ADMIN — MORPHINE SULFATE 2 MILLIGRAM(S): 50 CAPSULE, EXTENDED RELEASE ORAL at 22:53

## 2017-11-29 NOTE — H&P ADULT - NSHPLABSRESULTS_GEN_ALL_CORE
INTERPRETATION:  CTA of abdomen and pelvis    History: Left upper quadrant abdominal pain with recurrent splenic   hemorrhages. Rule out splenic hemorrhage.     Technique: CTA of the abdomen and pelvis was performed from the top of   the diaphragms to the bottom of the ischial tuberosities. Oral contrast   not administered, as ordered. Axial, coronal, and sagittal multiplanar   reformatted images and coronal and sagittal MIPS were performed.    Comparison made with CT scan of abdomen and pelvis from 7/5/2017.    Findings: Images of the lower chest again demonstrate gynecomastia   bilaterally. There is thickening of the right minor fissure.    Liver unremarkable. No radiopaque stones gallbladder. No significant   change in size of a 7.7 x 5.5 cm low-density (2 Hounsfield units)   subcapsular collection along the anterior aspect of the superior portion   of the spleen. However, there is new hemorrhage involving the more   inferior and lateral portions of the spleen, with a small amount of   hemorrhagic material extending inferiorly in the left anterior pararenal   space. The pancreas, right adrenal gland, and kidneys are unremarkable.   There is again slight thickening of the left adrenal gland.    No lymphadenopathy in abdomen or pelvis.    Limited evaluation of the gastrointestinal tract without oral contrast   material demonstrates submucosal fat deposition within the terminal ileum   and right colon. This could be due to obesity or chronic inflammatory   bowel disease.    Large amount of calcified plaque aorta and pelvic arteries bilaterally.   Incidental note made of common hepatic artery arising directly from aorta.    There are small fat-containing inguinal hernias bilaterally.    There are degenerative changes of the spine and sacroiliac joints   bilaterally.      Impression: 1. Since 7/5/2017, there is new splenic hemorrhage involving   the inferior and lateral portions of the spleen with blood products   extending inferiorly in the left retroperitoneum.    2. No significant change in size of chronic subcapsular hematoma in the   anterior aspect of the superior portion of the spleen.

## 2017-11-29 NOTE — H&P ADULT - ASSESSMENT
51 yo M with h/o CAD s/p stent, HTN, chronic pancreatitis, chronic splenic subcapsular hematoma with sudden onset LUQ pain and new splenic hemorrhage  -admit to tele  -NPO, IVF  -trend H/H  -will consult IR for possible angio if H/H drops or pt becomes unstable  -pain/nausea control  -SCDs  -discussed with Dr Hu

## 2017-11-29 NOTE — H&P ADULT - HISTORY OF PRESENT ILLNESS
50 yo M with PMH of CAD, angioplasty 10 years ago (not on ASA), HTN, and chronic pancreatitis 2/2 to EtOH abuse, atraumatic splenic hematoma treated non operatively, h/o segmental PE (resolved) presented to ED with acute onset of LUQ abdominal pain one day ago. Pt reports a sharp, "ripping pain" which came on at rest and has been persistent since then. Reports nausea but no vomiting. Passing flatus and having regular BMs. No fever or chills, no lightheadedness or dizziness.   CTA in ED demonstrated new splenic hemorrhage involving the inferior and lateral portions of the spleen with blood products   extending inferiorly in the left retroperitoneum and no significant change in size of chronic subcapsular hematoma in the   anterior aspect of the superior portion of the spleen.

## 2017-11-29 NOTE — H&P ADULT - NSHPPHYSICALEXAM_GEN_ALL_CORE
Gen: AOx3, NAD    CV: RRR, no m/r/g    Pulm: CTABL, no resp distress    Abdomen: soft, tender on left side of abdomen, primarily in LUQ, no guarding or rebound    Ext: WWP

## 2017-11-29 NOTE — ED PROVIDER NOTE - OBJECTIVE STATEMENT
51M w/PMH of CAD, MI s/p angioplasty 10 years currently not on aspirin, HTN, and chronic pancreatitis 2/2 to EtOH abuse, atraumatic splenic hematoma treated non operatively (NICOLE drain placed on 2/1/17 removed 2/2/17), segmental PE that has resolved in Feb 2017, now p/w acute onset LUQ abd pain for past 24hrs, felt a "ripping pain" which occurred at rest and has had pain since that time. + nausea, no vomiting. No c/d, normal BM this morning. No urinary sx. No fever/chills.

## 2017-11-29 NOTE — ED PROVIDER NOTE - PHYSICAL EXAMINATION
VITAL SIGNS: I have reviewed nursing notes and confirm.  CONSTITUTIONAL: Well-developed; well-nourished mildly disheveled man lying uncomfortably in stretcher though speaking clearly in complete sentences, A&Ox3 w/approp affect; in no acute distress.  SKIN: Agree with RN documentation regarding decubitus evaluation. Remainder of skin exam is warm and dry, no acute rash.  HEAD: Normocephalic; atraumatic.  EYES: PERRL, EOM intact; conjunctiva and sclera clear.  ENT: No nasal discharge; airway clear.  NECK: Supple; non tender.  CARD: S1, S2 normal; no murmurs, gallops, or rubs. RRR  RESP: No wheezes, rales or rhonchi. CTA b/l w/good excursion, no inc wob or tachypnea  ABD: Normal bowel sounds; soft; + TTP LUQ w/guarding, no rigidity, negative burk's, no mcburney's pt TTP  : No CVA TTP b/l  EXT: Normal ROM. No clubbing, cyanosis or edema. non-ttp, no erythema  LYMPH: No acute cervical adenopathy.  NEURO: Alert, oriented. Grossly unremarkable.  PSYCH: Cooperative, appropriate.

## 2017-11-29 NOTE — ED ADULT TRIAGE NOTE - CHIEF COMPLAINT QUOTE
patient BIBA from home. complains of left flank pain since this morning. he states that he has a hx of an aortic aneurysm and "hematoma on spleen" denies chest pain. hypertensive in triage reports that he is compliant with medication and took lisinopril this morning. denies dizziness/headache

## 2017-11-29 NOTE — ED ADULT NURSE NOTE - OBJECTIVE STATEMENT
51 y/o male w/ PMH of CAD, MI s/p angioplasty 10 years currently not on aspirin, HTN, and chronic pancreatitis 2/2 to EtOH abuse, atraumatic splenic hematoma treated non operatively (NICOLE drain placed on 2/1/17 removed 2/2/17), segmental PE that has resolved in Feb 2017 51 y/o male w/ PMH of CAD, MI s/p angioplasty 10 years currently not on aspirin, HTN, and chronic pancreatitis 2/2 to EtOH abuse, atraumatic splenic hematoma treated non operatively (NICOLE drain placed on 2/1/17 removed 2/2/17), segmental PE that has resolved in Feb 2017 c/o 10/10 LLQ pain radiating to the back. Patient hypertensive on arrival, reports he took his daily meds.

## 2017-11-30 ENCOUNTER — TRANSCRIPTION ENCOUNTER (OUTPATIENT)
Age: 52
End: 2017-11-30

## 2017-11-30 LAB
ANION GAP SERPL CALC-SCNC: 13 MMOL/L — SIGNIFICANT CHANGE UP (ref 5–17)
BUN SERPL-MCNC: 9 MG/DL — SIGNIFICANT CHANGE UP (ref 7–23)
CALCIUM SERPL-MCNC: 9.1 MG/DL — SIGNIFICANT CHANGE UP (ref 8.4–10.5)
CHLORIDE SERPL-SCNC: 94 MMOL/L — LOW (ref 96–108)
CO2 SERPL-SCNC: 26 MMOL/L — SIGNIFICANT CHANGE UP (ref 22–31)
CREAT SERPL-MCNC: 0.74 MG/DL — SIGNIFICANT CHANGE UP (ref 0.5–1.3)
CULTURE RESULTS: NO GROWTH — SIGNIFICANT CHANGE UP
GLUCOSE SERPL-MCNC: 132 MG/DL — HIGH (ref 70–99)
HCT VFR BLD CALC: 36.5 % — LOW (ref 39–50)
HGB BLD-MCNC: 12.1 G/DL — LOW (ref 13–17)
MAGNESIUM SERPL-MCNC: 1.8 MG/DL — SIGNIFICANT CHANGE UP (ref 1.6–2.6)
MCHC RBC-ENTMCNC: 31.5 PG — SIGNIFICANT CHANGE UP (ref 27–34)
MCHC RBC-ENTMCNC: 33.2 G/DL — SIGNIFICANT CHANGE UP (ref 32–36)
MCV RBC AUTO: 95.1 FL — SIGNIFICANT CHANGE UP (ref 80–100)
PHOSPHATE SERPL-MCNC: 3.5 MG/DL — SIGNIFICANT CHANGE UP (ref 2.5–4.5)
PLATELET # BLD AUTO: 176 K/UL — SIGNIFICANT CHANGE UP (ref 150–400)
POTASSIUM SERPL-MCNC: 3.9 MMOL/L — SIGNIFICANT CHANGE UP (ref 3.5–5.3)
POTASSIUM SERPL-SCNC: 3.9 MMOL/L — SIGNIFICANT CHANGE UP (ref 3.5–5.3)
RBC # BLD: 3.84 M/UL — LOW (ref 4.2–5.8)
RBC # FLD: 12.8 % — SIGNIFICANT CHANGE UP (ref 10.3–16.9)
SODIUM SERPL-SCNC: 133 MMOL/L — LOW (ref 135–145)
SPECIMEN SOURCE: SIGNIFICANT CHANGE UP
WBC # BLD: 11.7 K/UL — HIGH (ref 3.8–10.5)
WBC # FLD AUTO: 11.7 K/UL — HIGH (ref 3.8–10.5)

## 2017-11-30 RX ORDER — ACETAMINOPHEN 500 MG
1000 TABLET ORAL ONCE
Qty: 0 | Refills: 0 | Status: COMPLETED | OUTPATIENT
Start: 2017-11-30 | End: 2017-11-30

## 2017-11-30 RX ADMIN — HYDROMORPHONE HYDROCHLORIDE 1 MILLIGRAM(S): 2 INJECTION INTRAMUSCULAR; INTRAVENOUS; SUBCUTANEOUS at 21:00

## 2017-11-30 RX ADMIN — HYDROMORPHONE HYDROCHLORIDE 1 MILLIGRAM(S): 2 INJECTION INTRAMUSCULAR; INTRAVENOUS; SUBCUTANEOUS at 07:08

## 2017-11-30 RX ADMIN — HYDROMORPHONE HYDROCHLORIDE 1 MILLIGRAM(S): 2 INJECTION INTRAMUSCULAR; INTRAVENOUS; SUBCUTANEOUS at 12:05

## 2017-11-30 RX ADMIN — HYDROMORPHONE HYDROCHLORIDE 1 MILLIGRAM(S): 2 INJECTION INTRAMUSCULAR; INTRAVENOUS; SUBCUTANEOUS at 06:21

## 2017-11-30 RX ADMIN — HYDROMORPHONE HYDROCHLORIDE 1 MILLIGRAM(S): 2 INJECTION INTRAMUSCULAR; INTRAVENOUS; SUBCUTANEOUS at 02:49

## 2017-11-30 RX ADMIN — HYDROMORPHONE HYDROCHLORIDE 1 MILLIGRAM(S): 2 INJECTION INTRAMUSCULAR; INTRAVENOUS; SUBCUTANEOUS at 16:30

## 2017-11-30 RX ADMIN — HYDROMORPHONE HYDROCHLORIDE 1 MILLIGRAM(S): 2 INJECTION INTRAMUSCULAR; INTRAVENOUS; SUBCUTANEOUS at 20:44

## 2017-11-30 RX ADMIN — HYDROMORPHONE HYDROCHLORIDE 1 MILLIGRAM(S): 2 INJECTION INTRAMUSCULAR; INTRAVENOUS; SUBCUTANEOUS at 16:02

## 2017-11-30 RX ADMIN — ATORVASTATIN CALCIUM 40 MILLIGRAM(S): 80 TABLET, FILM COATED ORAL at 22:24

## 2017-11-30 RX ADMIN — Medication 400 MILLIGRAM(S): at 23:17

## 2017-11-30 RX ADMIN — Medication 120 MILLIGRAM(S): at 06:10

## 2017-11-30 RX ADMIN — Medication 1000 MILLIGRAM(S): at 23:45

## 2017-11-30 RX ADMIN — LISINOPRIL 20 MILLIGRAM(S): 2.5 TABLET ORAL at 06:10

## 2017-11-30 RX ADMIN — HYDROMORPHONE HYDROCHLORIDE 1 MILLIGRAM(S): 2 INJECTION INTRAMUSCULAR; INTRAVENOUS; SUBCUTANEOUS at 02:21

## 2017-11-30 RX ADMIN — SODIUM CHLORIDE 110 MILLILITER(S): 9 INJECTION, SOLUTION INTRAVENOUS at 21:06

## 2017-11-30 RX ADMIN — HYDROMORPHONE HYDROCHLORIDE 1 MILLIGRAM(S): 2 INJECTION INTRAMUSCULAR; INTRAVENOUS; SUBCUTANEOUS at 11:46

## 2017-11-30 NOTE — PROGRESS NOTE ADULT - SUBJECTIVE AND OBJECTIVE BOX
O/N: admitted w/ splenic hemorrhage, repeat H/H stable 12.6/37.8, VSS O/N: admitted w/ splenic hemorrhage, repeat H/H stable 12.6/37.8, VSS    Hemodynamically stable   Resting comfortably in bed       Vital Signs Last 24 Hrs  T(C): 37.1 (30 Nov 2017 05:49), Max: 37.1 (29 Nov 2017 15:09)  T(F): 98.8 (30 Nov 2017 05:49), Max: 98.8 (30 Nov 2017 05:49)  HR: 88 (30 Nov 2017 05:57) (77 - 97)  BP: 120/72 (30 Nov 2017 05:57) (120/72 - 206/127)  BP(mean): 89 (30 Nov 2017 05:57) (89 - 116)  RR: 16 (30 Nov 2017 05:57) (16 - 24)  SpO2: 94% (30 Nov 2017 05:57) (94% - 98%)      I&O's Summary    29 Nov 2017 07:01  -  30 Nov 2017 07:00  --------------------------------------------------------  IN: 330 mL / OUT: 625 mL / NET: -295 mL        Gen: NAD   Abd: soft, nt / nd

## 2017-11-30 NOTE — DISCHARGE NOTE ADULT - PLAN OF CARE
follow up follow up in the office with Dr. Hu. Call to schedule an appointment in 1 - 2 weeks. Call sooner if symptoms return or fevers.

## 2017-11-30 NOTE — DISCHARGE NOTE ADULT - HOSPITAL COURSE
51 yo M with h/o CAD s/p stent, HTN, chronic pancreatitis, chronic splenic subcapsular hematoma with sudden onset LUQ pain and new splenic hemorrhage. The patient was observed in the hospital with serial CBCs. The patients hemoglobin and hematocrit were stable. The patient was sent home with instructions to follow up in the office. The patient was sent home with pain medication PRN. No acute interventions were required at this due to stable vital signs and labs.

## 2017-11-30 NOTE — DISCHARGE NOTE ADULT - MEDICATION SUMMARY - MEDICATIONS TO TAKE
I will START or STAY ON the medications listed below when I get home from the hospital:    ibuprofen 800 mg oral tablet  -- 1 tab(s) by mouth 3 times a day, As Needed -for severe pain  -- Do not take this drug if you are pregnant.  It is very important that you take or use this exactly as directed.  Do not skip doses or discontinue unless directed by your doctor.  May cause drowsiness or dizziness.  Obtain medical advice before taking any non-prescription drugs as some may affect the action of this medication.  Take with food or milk.    -- Indication: For prn pain    lisinopril 20 mg oral tablet  -- 1 tab(s) by mouth once a day  -- Indication: For home med     dilTIAZem 120 mg/24 hours oral capsule, extended release  -- 1 cap(s) by mouth once a day  -- Indication: For home med     atorvastatin 40 mg oral tablet  -- 1 tab(s) by mouth once a day (at bedtime)  -- Indication: For home med I will START or STAY ON the medications listed below when I get home from the hospital:    ibuprofen 800 mg oral tablet  -- 1 tab(s) by mouth 3 times a day, As Needed -for severe pain  -- Do not take this drug if you are pregnant.  It is very important that you take or use this exactly as directed.  Do not skip doses or discontinue unless directed by your doctor.  May cause drowsiness or dizziness.  Obtain medical advice before taking any non-prescription drugs as some may affect the action of this medication.  Take with food or milk.    -- Indication: For prn pain    Percocet 5/325 oral tablet  -- 1 tab(s) by mouth every 6 hours MDD:4  -- Caution federal law prohibits the transfer of this drug to any person other  than the person for whom it was prescribed.  May cause drowsiness.  Alcohol may intensify this effect.  Use care when operating dangerous machinery.  This prescription cannot be refilled.  This product contains acetaminophen.  Do not use  with any other product containing acetaminophen to prevent possible liver damage.  Using more of this medication than prescribed may cause serious breathing problems.    -- Indication: For prn pain     lisinopril 20 mg oral tablet  -- 1 tab(s) by mouth once a day  -- Indication: For home med     dilTIAZem 120 mg/24 hours oral capsule, extended release  -- 1 cap(s) by mouth once a day  -- Indication: For home med     atorvastatin 40 mg oral tablet  -- 1 tab(s) by mouth once a day (at bedtime)  -- Indication: For home med

## 2017-11-30 NOTE — DISCHARGE NOTE ADULT - CARE PLAN
Principal Discharge DX:	Splenic hemorrhage  Goal:	follow up  Instructions for follow-up, activity and diet:	follow up in the office with Dr. Hu. Call to schedule an appointment in 1 - 2 weeks. Call sooner if symptoms return or fevers.

## 2017-11-30 NOTE — PROGRESS NOTE ADULT - ASSESSMENT
51 yo M with h/o CAD s/p stent, HTN, chronic pancreatitis, chronic splenic subcapsular hematoma with sudden onset LUQ pain and new splenic hemorrhage  -admit to tele  -NPO, IVF  -trend H/H  -will consult IR for possible angio if H/H drops or pt becomes unstable  -pain/nausea control  -SCDs  -discussed with Dr Hu 53 yo M with h/o CAD s/p stent, HTN, chronic pancreatitis, chronic splenic subcapsular hematoma with sudden onset LUQ pain and new splenic hemorrhage  -admit to tele  -NPO, IVF  -trend H/H  -will consult IR for possible angio if H/H drops or pt becomes unstable  -pain/nausea control  -SCDs  -discussed with Dr Hu  - vascular consult

## 2017-12-01 VITALS — TEMPERATURE: 99 F

## 2017-12-01 LAB
ANION GAP SERPL CALC-SCNC: 14 MMOL/L — SIGNIFICANT CHANGE UP (ref 5–17)
BUN SERPL-MCNC: 6 MG/DL — LOW (ref 7–23)
CALCIUM SERPL-MCNC: 9.2 MG/DL — SIGNIFICANT CHANGE UP (ref 8.4–10.5)
CHLORIDE SERPL-SCNC: 98 MMOL/L — SIGNIFICANT CHANGE UP (ref 96–108)
CO2 SERPL-SCNC: 26 MMOL/L — SIGNIFICANT CHANGE UP (ref 22–31)
CREAT SERPL-MCNC: 0.71 MG/DL — SIGNIFICANT CHANGE UP (ref 0.5–1.3)
GLUCOSE SERPL-MCNC: 117 MG/DL — HIGH (ref 70–99)
HCT VFR BLD CALC: 35.4 % — LOW (ref 39–50)
HGB BLD-MCNC: 11.8 G/DL — LOW (ref 13–17)
MAGNESIUM SERPL-MCNC: 1.9 MG/DL — SIGNIFICANT CHANGE UP (ref 1.6–2.6)
MCHC RBC-ENTMCNC: 31.8 PG — SIGNIFICANT CHANGE UP (ref 27–34)
MCHC RBC-ENTMCNC: 33.3 G/DL — SIGNIFICANT CHANGE UP (ref 32–36)
MCV RBC AUTO: 95.4 FL — SIGNIFICANT CHANGE UP (ref 80–100)
PHOSPHATE SERPL-MCNC: 3 MG/DL — SIGNIFICANT CHANGE UP (ref 2.5–4.5)
PLATELET # BLD AUTO: 171 K/UL — SIGNIFICANT CHANGE UP (ref 150–400)
POTASSIUM SERPL-MCNC: 3.7 MMOL/L — SIGNIFICANT CHANGE UP (ref 3.5–5.3)
POTASSIUM SERPL-SCNC: 3.7 MMOL/L — SIGNIFICANT CHANGE UP (ref 3.5–5.3)
RBC # BLD: 3.71 M/UL — LOW (ref 4.2–5.8)
RBC # FLD: 12.6 % — SIGNIFICANT CHANGE UP (ref 10.3–16.9)
SODIUM SERPL-SCNC: 138 MMOL/L — SIGNIFICANT CHANGE UP (ref 135–145)
WBC # BLD: 10.5 K/UL — SIGNIFICANT CHANGE UP (ref 3.8–10.5)
WBC # FLD AUTO: 10.5 K/UL — SIGNIFICANT CHANGE UP (ref 3.8–10.5)

## 2017-12-01 PROCEDURE — 83735 ASSAY OF MAGNESIUM: CPT

## 2017-12-01 PROCEDURE — 81001 URINALYSIS AUTO W/SCOPE: CPT

## 2017-12-01 PROCEDURE — 99285 EMERGENCY DEPT VISIT HI MDM: CPT | Mod: 25

## 2017-12-01 PROCEDURE — 83880 ASSAY OF NATRIURETIC PEPTIDE: CPT

## 2017-12-01 PROCEDURE — 85610 PROTHROMBIN TIME: CPT

## 2017-12-01 PROCEDURE — 71045 X-RAY EXAM CHEST 1 VIEW: CPT

## 2017-12-01 PROCEDURE — 96376 TX/PRO/DX INJ SAME DRUG ADON: CPT

## 2017-12-01 PROCEDURE — 74174 CTA ABD&PLVS W/CONTRAST: CPT

## 2017-12-01 PROCEDURE — 82550 ASSAY OF CK (CPK): CPT

## 2017-12-01 PROCEDURE — 85025 COMPLETE CBC W/AUTO DIFF WBC: CPT

## 2017-12-01 PROCEDURE — 85027 COMPLETE CBC AUTOMATED: CPT

## 2017-12-01 PROCEDURE — 82553 CREATINE MB FRACTION: CPT

## 2017-12-01 PROCEDURE — 84100 ASSAY OF PHOSPHORUS: CPT

## 2017-12-01 PROCEDURE — 80307 DRUG TEST PRSMV CHEM ANLYZR: CPT

## 2017-12-01 PROCEDURE — 80053 COMPREHEN METABOLIC PANEL: CPT

## 2017-12-01 PROCEDURE — 96375 TX/PRO/DX INJ NEW DRUG ADDON: CPT

## 2017-12-01 PROCEDURE — 85730 THROMBOPLASTIN TIME PARTIAL: CPT

## 2017-12-01 PROCEDURE — 93005 ELECTROCARDIOGRAM TRACING: CPT

## 2017-12-01 PROCEDURE — 96374 THER/PROPH/DIAG INJ IV PUSH: CPT | Mod: XU

## 2017-12-01 PROCEDURE — 87086 URINE CULTURE/COLONY COUNT: CPT

## 2017-12-01 PROCEDURE — 80048 BASIC METABOLIC PNL TOTAL CA: CPT

## 2017-12-01 PROCEDURE — 84484 ASSAY OF TROPONIN QUANT: CPT

## 2017-12-01 PROCEDURE — 83605 ASSAY OF LACTIC ACID: CPT

## 2017-12-01 PROCEDURE — 36415 COLL VENOUS BLD VENIPUNCTURE: CPT

## 2017-12-01 RX ORDER — OXYCODONE AND ACETAMINOPHEN 5; 325 MG/1; MG/1
1 TABLET ORAL EVERY 4 HOURS
Qty: 0 | Refills: 0 | Status: DISCONTINUED | OUTPATIENT
Start: 2017-12-01 | End: 2017-12-01

## 2017-12-01 RX ORDER — OXYCODONE AND ACETAMINOPHEN 5; 325 MG/1; MG/1
2 TABLET ORAL EVERY 4 HOURS
Qty: 0 | Refills: 0 | Status: DISCONTINUED | OUTPATIENT
Start: 2017-12-01 | End: 2017-12-01

## 2017-12-01 RX ADMIN — Medication 120 MILLIGRAM(S): at 06:11

## 2017-12-01 RX ADMIN — HYDROMORPHONE HYDROCHLORIDE 1 MILLIGRAM(S): 2 INJECTION INTRAMUSCULAR; INTRAVENOUS; SUBCUTANEOUS at 06:11

## 2017-12-01 RX ADMIN — LISINOPRIL 20 MILLIGRAM(S): 2.5 TABLET ORAL at 06:11

## 2017-12-01 RX ADMIN — HYDROMORPHONE HYDROCHLORIDE 1 MILLIGRAM(S): 2 INJECTION INTRAMUSCULAR; INTRAVENOUS; SUBCUTANEOUS at 06:25

## 2017-12-01 NOTE — PROGRESS NOTE ADULT - SUBJECTIVE AND OBJECTIVE BOX
O/N: DORIE, VSS, still c/o pain O/N: DORIE, VSS, still c/o pain      Vital Signs Last 24 Hrs  T(C): 37.6 (01 Dec 2017 06:00), Max: 37.6 (01 Dec 2017 06:00)  T(F): 99.7 (01 Dec 2017 06:00), Max: 99.7 (01 Dec 2017 06:00)  HR: 94 (01 Dec 2017 05:45) (90 - 104)  BP: 137/75 (01 Dec 2017 05:45) (126/79 - 142/83)  BP(mean): 96 (01 Dec 2017 05:45) (96 - 105)  RR: 18 (01 Dec 2017 05:45) (16 - 18)  SpO2: 94% (01 Dec 2017 05:45) (93% - 97%)      I&O's Summary    30 Nov 2017 07:01  -  01 Dec 2017 07:00  --------------------------------------------------------  IN: 2880 mL / OUT: 2380 mL / NET: 500 mL        Gen: NAD   Abd: soft / nt / nd

## 2017-12-01 NOTE — PROGRESS NOTE ADULT - ASSESSMENT
51 yo M with h/o CAD s/p stent, HTN, chronic pancreatitis, chronic splenic subcapsular hematoma with sudden onset LUQ pain and new splenic hemorrhage, CBC stable  -CLD  -will consult IR for possible angio if H/H drops or pt becomes unstable  -pain/nausea control  -SCDs  -AM labs  -Home meds 53 yo M with h/o CAD s/p stent, HTN, chronic pancreatitis, chronic splenic subcapsular hematoma with sudden onset LUQ pain and new splenic hemorrhage, CBC stable  DVT ppx   pain control   Reg   PO meds   OOB   Dispo --> d/c home

## 2017-12-04 DIAGNOSIS — D73.5 INFARCTION OF SPLEEN: ICD-10-CM

## 2017-12-04 DIAGNOSIS — Z86.711 PERSONAL HISTORY OF PULMONARY EMBOLISM: ICD-10-CM

## 2017-12-04 DIAGNOSIS — F17.210 NICOTINE DEPENDENCE, CIGARETTES, UNCOMPLICATED: ICD-10-CM

## 2017-12-04 DIAGNOSIS — K86.0 ALCOHOL-INDUCED CHRONIC PANCREATITIS: ICD-10-CM

## 2017-12-04 DIAGNOSIS — I10 ESSENTIAL (PRIMARY) HYPERTENSION: ICD-10-CM

## 2017-12-04 DIAGNOSIS — Z98.61 CORONARY ANGIOPLASTY STATUS: ICD-10-CM

## 2017-12-04 DIAGNOSIS — I25.119 ATHEROSCLEROTIC HEART DISEASE OF NATIVE CORONARY ARTERY WITH UNSPECIFIED ANGINA PECTORIS: ICD-10-CM

## 2017-12-04 DIAGNOSIS — S36.029S: ICD-10-CM

## 2017-12-04 DIAGNOSIS — F10.10 ALCOHOL ABUSE, UNCOMPLICATED: ICD-10-CM

## 2017-12-20 NOTE — ED ADULT NURSE NOTE - ED CARDIAC CAPILLARY REFILL
Outpatient Speech Therapy    [x] [place]  Phone: [phone]  Fax: [phone]      [] [place]  Phone: [phone]  Fax: [phone] THERAPY DAILY PROGRESS NOTE    Patient: Emma Garcia     History Number: [id number]  Age: 1 y.o.      : 2015     PCP: Vincent Goncalves NP  Onset date:  13  Referring doctor: Dr. Raquel Curran  Diagnosis:   Atypical Rett Syndrome  Speech delay  Receptive-expressive language impairment  Cognitive-communication impairment        Precautions:  universal     Date: 2017     Time in: 01:35 pm  Visit:  18/       Time out: 02:08 pm  Total Visits: 23  Insurance information:    Plan of care signed (Y/N): y  Next re-certification due by:  2018    PAIN  [x]No     []Yes      Location: N/A   Pain Rating (0-10 pain scale): patient unable to understand pain chart or quantify pain. No signs of pain or discomfort observed during session. Pain Description: N/A     G-Code (if applicable):     Date / Visit # G-Code Applied: 10/14/16  Visit #1  SLP G-Codes  Functional Assessment Tool Used:  Language Scale-5   Score: SS- 50, percentile =1, age equivalent 1-2  Functional Limitations: Spoken language expressive  Spoken Language Expression Current Status (): At least 80 percent but less than 100 percent impaired, limited or restricted  Spoken Language Expression Goal Status (): At least 60 percent but less than 80 percent impaired, limited or restricted    Next due by: Visit #10               Subjective report: Angele Siemens was accompanied to therapy by her mother and two siblings. Shanthi was pleasant and compliant with all tasks. She laughed throughout the session. Mom indicated they have missed the past few weeks td/t Shanthi and others in the family being ill. Per maternal report, Angele Siemens is scheduled to receive her SGD tomorrow 17. Goal 1: Shanthi will identify articles of clothing in 4/5 trials. 1/5  Hat    Shoes with prompt   Goal 2: Shanthi will imitate vocalizations in play in 4/5 trials.   3/5    Fills in vocalizations during fingerplay/songs (mmm)        Goal 3:  When given choice of 2 items/activities, Shanthi will use SGD to indicate her choice in 4/5 trials. SLP's SGD not functioning appropriately this date. Avila Hernandez pointed to desired item given choice of 2. Goal 4: Shanthi will use SGD to direct another's behavior(e.g., help, more, all done, open, etc.) in 4/5 trials. SLP's SGD not functioning appropriately this date. Shanthi signed \"more\" and \"all done\" with verbal prompts this date. She signed \"open\" when verbally prompted and given initial model of sign. Patient education/  home program           New Education provided to patient/ family/ caregiver   [] Yes              [x] No   Comments:    Continued review of prior education:   Continue to label body parts and articles of clothing.   Model vocalizations in play, sing simple songs with gestures to assist with imitation skills, sing \"Head, Shoulder, Knees, and Toes\" for body part identification  Method of Education:   [x] Discussion     [x] Demonstration    [] Written     [] Other    Evaluation of Patients Response to Education:        [x] Patient and/or Caregiver verbalized understanding  [] Patient and/or Caregiver demonstrated without assistance  [] Patient and/or Caregiver demonstrated with assistance  [] Needs additional instruction to demonstrate understanding of education     Treatment/Response: Patient tolerated todays treatment session:   [x] Good         []  Fair         []  Poor    Limitations/ difficulties with treatment session due to:          []Attention      []Pain             []Fatigue       []Other medical complications              []Other:                   Comments:     Plan/Goals:     [x]  Continue with current plan of care  []  [facility]  [] [facility] per patient request  []  Change Treatment plan:     Next appointment not yet scheduled     Timed Based:  [] Cognitive Skills (44859)     Timed Code Treatment Minutes:         Speech :  [x] Speech individual (42491)     [] Swallow/oral function treatment (66446)    [] Communication device modification (53108)         Electronically signed by:     Jason Barrera MS, CCC-SLP      Date: 12/20/2017 2 seconds or less

## 2017-12-28 ENCOUNTER — APPOINTMENT (OUTPATIENT)
Dept: HEMATOLOGY ONCOLOGY | Facility: CLINIC | Age: 52
End: 2017-12-28

## 2018-01-09 ENCOUNTER — TRANSCRIPTION ENCOUNTER (OUTPATIENT)
Age: 53
End: 2018-01-09

## 2018-01-10 ENCOUNTER — APPOINTMENT (OUTPATIENT)
Dept: INTERNAL MEDICINE | Facility: CLINIC | Age: 53
End: 2018-01-10
Payer: MEDICAID

## 2018-01-10 VITALS
WEIGHT: 224 LBS | SYSTOLIC BLOOD PRESSURE: 160 MMHG | OXYGEN SATURATION: 98 % | HEART RATE: 98 BPM | HEIGHT: 60 IN | BODY MASS INDEX: 43.98 KG/M2 | TEMPERATURE: 98.3 F | DIASTOLIC BLOOD PRESSURE: 90 MMHG

## 2018-01-10 DIAGNOSIS — K21.9 GASTRO-ESOPHAGEAL REFLUX DISEASE W/OUT ESOPHAGITIS: ICD-10-CM

## 2018-01-10 DIAGNOSIS — I25.10 ATHEROSCLEROTIC HEART DISEASE OF NATIVE CORONARY ARTERY W/OUT ANGINA PECTORIS: ICD-10-CM

## 2018-01-10 DIAGNOSIS — R05 COUGH: ICD-10-CM

## 2018-01-10 DIAGNOSIS — Z86.711 PERSONAL HISTORY OF PULMONARY EMBOLISM: ICD-10-CM

## 2018-01-10 DIAGNOSIS — Z79.01 LONG TERM (CURRENT) USE OF ANTICOAGULANTS: ICD-10-CM

## 2018-01-10 DIAGNOSIS — Z01.00 ENCOUNTER FOR EXAMINATION OF EYES AND VISION W/OUT ABNORMAL FINDINGS: ICD-10-CM

## 2018-01-10 PROCEDURE — 99406 BEHAV CHNG SMOKING 3-10 MIN: CPT | Mod: 25

## 2018-01-10 PROCEDURE — 99213 OFFICE O/P EST LOW 20 MIN: CPT | Mod: 25

## 2018-01-10 PROCEDURE — 36415 COLL VENOUS BLD VENIPUNCTURE: CPT

## 2018-01-10 PROCEDURE — 99396 PREV VISIT EST AGE 40-64: CPT | Mod: 25

## 2018-01-10 RX ORDER — UBIDECARENONE/VIT E ACET 100MG-5
1000 CAPSULE ORAL
Refills: 0 | Status: COMPLETED | COMMUNITY
End: 2018-01-10

## 2018-01-10 RX ORDER — ERGOCALCIFEROL 1.25 MG/1
1.25 MG CAPSULE, LIQUID FILLED ORAL
Qty: 4 | Refills: 5 | Status: COMPLETED | COMMUNITY
Start: 2017-02-13 | End: 2018-01-10

## 2018-01-10 RX ORDER — SIMVASTATIN 40 MG/1
40 TABLET, FILM COATED ORAL
Refills: 0 | Status: COMPLETED | COMMUNITY
End: 2018-01-10

## 2018-01-10 RX ORDER — ERGOCALCIFEROL 1.25 MG/1
1.25 MG CAPSULE, LIQUID FILLED ORAL
Qty: 4 | Refills: 5 | Status: COMPLETED | COMMUNITY
Start: 2017-03-28 | End: 2018-01-10

## 2018-01-11 LAB
25(OH)D3 SERPL-MCNC: 15.5 NG/ML
ALBUMIN SERPL ELPH-MCNC: 4.6 G/DL
ALP BLD-CCNC: 102 U/L
ALT SERPL-CCNC: 36 U/L
ANION GAP SERPL CALC-SCNC: 20 MMOL/L
AST SERPL-CCNC: 27 U/L
BASOPHILS # BLD AUTO: 0.09 K/UL
BASOPHILS NFR BLD AUTO: 0.9 %
BILIRUB SERPL-MCNC: 0.5 MG/DL
BUN SERPL-MCNC: 13 MG/DL
CALCIUM SERPL-MCNC: 9.6 MG/DL
CHLORIDE SERPL-SCNC: 96 MMOL/L
CHOLEST SERPL-MCNC: 177 MG/DL
CHOLEST/HDLC SERPL: 3.5 RATIO
CK SERPL-CCNC: 98 U/L
CO2 SERPL-SCNC: 21 MMOL/L
CREAT SERPL-MCNC: 0.8 MG/DL
EOSINOPHIL # BLD AUTO: 0.29 K/UL
EOSINOPHIL NFR BLD AUTO: 2.8 %
GLUCOSE SERPL-MCNC: 105 MG/DL
HBA1C MFR BLD HPLC: 6.3 %
HCT VFR BLD CALC: 43 %
HDLC SERPL-MCNC: 51 MG/DL
HGB BLD-MCNC: 14.3 G/DL
IMM GRANULOCYTES NFR BLD AUTO: 0.3 %
LDLC SERPL CALC-MCNC: 78 MG/DL
LYMPHOCYTES # BLD AUTO: 3.46 K/UL
LYMPHOCYTES NFR BLD AUTO: 33.7 %
MAN DIFF?: NORMAL
MCHC RBC-ENTMCNC: 31.8 PG
MCHC RBC-ENTMCNC: 33.3 GM/DL
MCV RBC AUTO: 95.6 FL
MONOCYTES # BLD AUTO: 0.67 K/UL
MONOCYTES NFR BLD AUTO: 6.5 %
NEUTROPHILS # BLD AUTO: 5.73 K/UL
NEUTROPHILS NFR BLD AUTO: 55.8 %
PLATELET # BLD AUTO: 253 K/UL
POTASSIUM SERPL-SCNC: 4.5 MMOL/L
PROT SERPL-MCNC: 7.6 G/DL
PSA FREE SERPL-MCNC: 0.14 NG/ML
RBC # BLD: 4.5 M/UL
RBC # FLD: 13.7 %
SODIUM SERPL-SCNC: 137 MMOL/L
TRIGL SERPL-MCNC: 238 MG/DL
TSH SERPL-ACNC: 1.94 UIU/ML
VIT B12 SERPL-MCNC: 273 PG/ML
WBC # FLD AUTO: 10.27 K/UL

## 2018-02-15 ENCOUNTER — APPOINTMENT (OUTPATIENT)
Dept: HEART AND VASCULAR | Facility: CLINIC | Age: 53
End: 2018-02-15

## 2018-04-24 ENCOUNTER — RX RENEWAL (OUTPATIENT)
Age: 53
End: 2018-04-24

## 2018-04-26 ENCOUNTER — FORM ENCOUNTER (OUTPATIENT)
Age: 53
End: 2018-04-26

## 2018-04-26 ENCOUNTER — APPOINTMENT (OUTPATIENT)
Dept: INTERNAL MEDICINE | Facility: CLINIC | Age: 53
End: 2018-04-26
Payer: MEDICAID

## 2018-04-26 ENCOUNTER — LABORATORY RESULT (OUTPATIENT)
Age: 53
End: 2018-04-26

## 2018-04-26 VITALS
OXYGEN SATURATION: 98 % | SYSTOLIC BLOOD PRESSURE: 140 MMHG | BODY MASS INDEX: 28.89 KG/M2 | HEART RATE: 69 BPM | TEMPERATURE: 98 F | HEIGHT: 73 IN | WEIGHT: 218 LBS | DIASTOLIC BLOOD PRESSURE: 90 MMHG

## 2018-04-26 PROCEDURE — 36415 COLL VENOUS BLD VENIPUNCTURE: CPT

## 2018-04-26 PROCEDURE — 99214 OFFICE O/P EST MOD 30 MIN: CPT | Mod: 25

## 2018-04-26 RX ORDER — LOSARTAN POTASSIUM 50 MG/1
50 TABLET, FILM COATED ORAL DAILY
Qty: 30 | Refills: 4 | Status: COMPLETED | COMMUNITY
Start: 2018-01-10 | End: 2018-04-26

## 2018-04-27 ENCOUNTER — APPOINTMENT (OUTPATIENT)
Dept: ULTRASOUND IMAGING | Facility: HOSPITAL | Age: 53
End: 2018-04-27
Payer: MEDICAID

## 2018-04-27 ENCOUNTER — OUTPATIENT (OUTPATIENT)
Dept: OUTPATIENT SERVICES | Facility: HOSPITAL | Age: 53
LOS: 1 days | End: 2018-04-27
Payer: MEDICAID

## 2018-04-27 PROCEDURE — 76700 US EXAM ABDOM COMPLETE: CPT

## 2018-04-27 PROCEDURE — 76700 US EXAM ABDOM COMPLETE: CPT | Mod: 26

## 2018-04-28 ENCOUNTER — EMERGENCY (EMERGENCY)
Facility: HOSPITAL | Age: 53
LOS: 1 days | Discharge: HOME | End: 2018-04-28
Attending: EMERGENCY MEDICINE | Admitting: EMERGENCY MEDICINE
Payer: MEDICAID

## 2018-04-28 VITALS
SYSTOLIC BLOOD PRESSURE: 173 MMHG | RESPIRATION RATE: 16 BRPM | HEIGHT: 73 IN | WEIGHT: 227.96 LBS | HEART RATE: 98 BPM | OXYGEN SATURATION: 97 % | TEMPERATURE: 98 F | DIASTOLIC BLOOD PRESSURE: 112 MMHG

## 2018-04-28 VITALS
HEART RATE: 80 BPM | RESPIRATION RATE: 18 BRPM | SYSTOLIC BLOOD PRESSURE: 136 MMHG | TEMPERATURE: 98 F | OXYGEN SATURATION: 95 % | DIASTOLIC BLOOD PRESSURE: 90 MMHG

## 2018-04-28 DIAGNOSIS — R10.11 RIGHT UPPER QUADRANT PAIN: ICD-10-CM

## 2018-04-28 DIAGNOSIS — Z79.891 LONG TERM (CURRENT) USE OF OPIATE ANALGESIC: ICD-10-CM

## 2018-04-28 DIAGNOSIS — Z79.899 OTHER LONG TERM (CURRENT) DRUG THERAPY: ICD-10-CM

## 2018-04-28 DIAGNOSIS — Z79.1 LONG TERM (CURRENT) USE OF NON-STEROIDAL ANTI-INFLAMMATORIES (NSAID): ICD-10-CM

## 2018-04-28 DIAGNOSIS — K85.90 ACUTE PANCREATITIS WITHOUT NECROSIS OR INFECTION, UNSPECIFIED: ICD-10-CM

## 2018-04-28 DIAGNOSIS — F17.200 NICOTINE DEPENDENCE, UNSPECIFIED, UNCOMPLICATED: ICD-10-CM

## 2018-04-28 DIAGNOSIS — I10 ESSENTIAL (PRIMARY) HYPERTENSION: ICD-10-CM

## 2018-04-28 DIAGNOSIS — I25.10 ATHEROSCLEROTIC HEART DISEASE OF NATIVE CORONARY ARTERY WITHOUT ANGINA PECTORIS: ICD-10-CM

## 2018-04-28 LAB
ALBUMIN SERPL ELPH-MCNC: 4.2 G/DL — SIGNIFICANT CHANGE UP (ref 3.3–5)
ALP SERPL-CCNC: 127 U/L — HIGH (ref 40–120)
ALT FLD-CCNC: 62 U/L — HIGH (ref 10–45)
ANION GAP SERPL CALC-SCNC: 15 MMOL/L — SIGNIFICANT CHANGE UP (ref 5–17)
APPEARANCE UR: CLEAR — SIGNIFICANT CHANGE UP
AST SERPL-CCNC: 60 U/L — HIGH (ref 10–40)
BASOPHILS NFR BLD AUTO: 0.8 % — SIGNIFICANT CHANGE UP (ref 0–2)
BILIRUB SERPL-MCNC: 0.7 MG/DL — SIGNIFICANT CHANGE UP (ref 0.2–1.2)
BILIRUB UR-MCNC: NEGATIVE — SIGNIFICANT CHANGE UP
BUN SERPL-MCNC: 15 MG/DL — SIGNIFICANT CHANGE UP (ref 7–23)
CALCIUM SERPL-MCNC: 9.3 MG/DL — SIGNIFICANT CHANGE UP (ref 8.4–10.5)
CHLORIDE SERPL-SCNC: 94 MMOL/L — LOW (ref 96–108)
CO2 SERPL-SCNC: 25 MMOL/L — SIGNIFICANT CHANGE UP (ref 22–31)
COLOR SPEC: YELLOW — SIGNIFICANT CHANGE UP
CREAT SERPL-MCNC: 0.74 MG/DL — SIGNIFICANT CHANGE UP (ref 0.5–1.3)
DIFF PNL FLD: NEGATIVE — SIGNIFICANT CHANGE UP
EOSINOPHIL NFR BLD AUTO: 1.4 % — SIGNIFICANT CHANGE UP (ref 0–6)
GLUCOSE SERPL-MCNC: 153 MG/DL — HIGH (ref 70–99)
GLUCOSE UR QL: NEGATIVE — SIGNIFICANT CHANGE UP
HCT VFR BLD CALC: 41.3 % — SIGNIFICANT CHANGE UP (ref 39–50)
HGB BLD-MCNC: 13.9 G/DL — SIGNIFICANT CHANGE UP (ref 13–17)
KETONES UR-MCNC: NEGATIVE — SIGNIFICANT CHANGE UP
LACTATE SERPL-SCNC: 1.1 MMOL/L — SIGNIFICANT CHANGE UP (ref 0.5–2)
LEUKOCYTE ESTERASE UR-ACNC: NEGATIVE — SIGNIFICANT CHANGE UP
LIDOCAIN IGE QN: 49 U/L — SIGNIFICANT CHANGE UP (ref 7–60)
LYMPHOCYTES # BLD AUTO: 21 % — SIGNIFICANT CHANGE UP (ref 13–44)
MCHC RBC-ENTMCNC: 31.4 PG — SIGNIFICANT CHANGE UP (ref 27–34)
MCHC RBC-ENTMCNC: 33.7 G/DL — SIGNIFICANT CHANGE UP (ref 32–36)
MCV RBC AUTO: 93.4 FL — SIGNIFICANT CHANGE UP (ref 80–100)
MONOCYTES NFR BLD AUTO: 5.9 % — SIGNIFICANT CHANGE UP (ref 2–14)
NEUTROPHILS NFR BLD AUTO: 70.9 % — SIGNIFICANT CHANGE UP (ref 43–77)
NITRITE UR-MCNC: NEGATIVE — SIGNIFICANT CHANGE UP
PH UR: 5.5 — SIGNIFICANT CHANGE UP (ref 5–8)
PLATELET # BLD AUTO: 170 K/UL — SIGNIFICANT CHANGE UP (ref 150–400)
POTASSIUM SERPL-MCNC: 4.3 MMOL/L — SIGNIFICANT CHANGE UP (ref 3.5–5.3)
POTASSIUM SERPL-SCNC: 4.3 MMOL/L — SIGNIFICANT CHANGE UP (ref 3.5–5.3)
PROT SERPL-MCNC: 7.7 G/DL — SIGNIFICANT CHANGE UP (ref 6–8.3)
PROT UR-MCNC: NEGATIVE MG/DL — SIGNIFICANT CHANGE UP
RBC # BLD: 4.42 M/UL — SIGNIFICANT CHANGE UP (ref 4.2–5.8)
RBC # FLD: 12.9 % — SIGNIFICANT CHANGE UP (ref 10.3–16.9)
SODIUM SERPL-SCNC: 134 MMOL/L — LOW (ref 135–145)
SP GR SPEC: <=1.005 — SIGNIFICANT CHANGE UP (ref 1–1.03)
UROBILINOGEN FLD QL: 0.2 E.U./DL — SIGNIFICANT CHANGE UP
WBC # BLD: 11.6 K/UL — HIGH (ref 3.8–10.5)
WBC # FLD AUTO: 11.6 K/UL — HIGH (ref 3.8–10.5)

## 2018-04-28 PROCEDURE — 96376 TX/PRO/DX INJ SAME DRUG ADON: CPT

## 2018-04-28 PROCEDURE — 87086 URINE CULTURE/COLONY COUNT: CPT

## 2018-04-28 PROCEDURE — 99284 EMERGENCY DEPT VISIT MOD MDM: CPT | Mod: 25

## 2018-04-28 PROCEDURE — 36415 COLL VENOUS BLD VENIPUNCTURE: CPT

## 2018-04-28 PROCEDURE — 74177 CT ABD & PELVIS W/CONTRAST: CPT

## 2018-04-28 PROCEDURE — 81003 URINALYSIS AUTO W/O SCOPE: CPT

## 2018-04-28 PROCEDURE — 74177 CT ABD & PELVIS W/CONTRAST: CPT | Mod: 26

## 2018-04-28 PROCEDURE — 83690 ASSAY OF LIPASE: CPT

## 2018-04-28 PROCEDURE — 80053 COMPREHEN METABOLIC PANEL: CPT

## 2018-04-28 PROCEDURE — 99284 EMERGENCY DEPT VISIT MOD MDM: CPT

## 2018-04-28 PROCEDURE — 83605 ASSAY OF LACTIC ACID: CPT

## 2018-04-28 PROCEDURE — 96375 TX/PRO/DX INJ NEW DRUG ADDON: CPT

## 2018-04-28 PROCEDURE — 96374 THER/PROPH/DIAG INJ IV PUSH: CPT | Mod: XU

## 2018-04-28 PROCEDURE — 85025 COMPLETE CBC W/AUTO DIFF WBC: CPT

## 2018-04-28 RX ORDER — ONDANSETRON 8 MG/1
4 TABLET, FILM COATED ORAL ONCE
Qty: 0 | Refills: 0 | Status: COMPLETED | OUTPATIENT
Start: 2018-04-28 | End: 2018-04-28

## 2018-04-28 RX ORDER — NICARDIPINE HYDROCHLORIDE 30 MG/1
30 CAPSULE, EXTENDED RELEASE ORAL ONCE
Qty: 0 | Refills: 0 | Status: COMPLETED | OUTPATIENT
Start: 2018-04-28 | End: 2018-04-28

## 2018-04-28 RX ORDER — SODIUM CHLORIDE 9 MG/ML
1000 INJECTION INTRAMUSCULAR; INTRAVENOUS; SUBCUTANEOUS ONCE
Qty: 0 | Refills: 0 | Status: COMPLETED | OUTPATIENT
Start: 2018-04-28 | End: 2018-04-28

## 2018-04-28 RX ORDER — TRAMADOL HYDROCHLORIDE 50 MG/1
1 TABLET ORAL
Qty: 12 | Refills: 0
Start: 2018-04-28 | End: 2018-04-30

## 2018-04-28 RX ORDER — MORPHINE SULFATE 50 MG/1
4 CAPSULE, EXTENDED RELEASE ORAL ONCE
Qty: 0 | Refills: 0 | Status: DISCONTINUED | OUTPATIENT
Start: 2018-04-28 | End: 2018-04-28

## 2018-04-28 RX ORDER — ONDANSETRON 8 MG/1
1 TABLET, FILM COATED ORAL
Qty: 21 | Refills: 0
Start: 2018-04-28 | End: 2018-05-04

## 2018-04-28 RX ORDER — FAMOTIDINE 10 MG/ML
20 INJECTION INTRAVENOUS ONCE
Qty: 0 | Refills: 0 | Status: COMPLETED | OUTPATIENT
Start: 2018-04-28 | End: 2018-04-28

## 2018-04-28 RX ORDER — IOHEXOL 300 MG/ML
50 INJECTION, SOLUTION INTRAVENOUS ONCE
Qty: 0 | Refills: 0 | Status: COMPLETED | OUTPATIENT
Start: 2018-04-28 | End: 2018-04-28

## 2018-04-28 RX ADMIN — NICARDIPINE HYDROCHLORIDE 30 MILLIGRAM(S): 30 CAPSULE, EXTENDED RELEASE ORAL at 11:31

## 2018-04-28 RX ADMIN — SODIUM CHLORIDE 1500 MILLILITER(S): 9 INJECTION INTRAMUSCULAR; INTRAVENOUS; SUBCUTANEOUS at 08:42

## 2018-04-28 RX ADMIN — FAMOTIDINE 20 MILLIGRAM(S): 10 INJECTION INTRAVENOUS at 08:45

## 2018-04-28 RX ADMIN — IOHEXOL 50 MILLILITER(S): 300 INJECTION, SOLUTION INTRAVENOUS at 10:13

## 2018-04-28 RX ADMIN — ONDANSETRON 4 MILLIGRAM(S): 8 TABLET, FILM COATED ORAL at 11:03

## 2018-04-28 RX ADMIN — ONDANSETRON 4 MILLIGRAM(S): 8 TABLET, FILM COATED ORAL at 08:44

## 2018-04-28 RX ADMIN — MORPHINE SULFATE 4 MILLIGRAM(S): 50 CAPSULE, EXTENDED RELEASE ORAL at 10:13

## 2018-04-28 NOTE — ED ADULT TRIAGE NOTE - CHIEF COMPLAINT QUOTE
pt c/o abdominal pain x 7 days with vomiting that started yesterday. pt had US done yesterday but does not know results. hx of fatty liver.

## 2018-04-28 NOTE — ED PROVIDER NOTE - ENMT NEGATIVE STATEMENT, MLM
.Nose: no nasal congestion Mouth/Throat:  no hoarseness .Neck: , no stiffness and no swollen glands.

## 2018-04-28 NOTE — ED ADULT NURSE NOTE - OBJECTIVE STATEMENT
Pt c/o R sided abdominal pain for the past week, pt states he was here for an ultrasound yesterday. Pt states he has hx of pancreatisti

## 2018-04-28 NOTE — ED PROVIDER NOTE - ATTENDING CONTRIBUTION TO CARE
I have reviewed all pertinent clinical data, and I agree with the documentation/care/plan executed by DENYN Shah.

## 2018-04-28 NOTE — ED PROVIDER NOTE - OBJECTIVE STATEMENT
51 y/o m with h/o pancreatitis, HTN, HLD , splenic abnormalities, alcoholism , CAD , cardiac stent x 1  presents to ED c/o 2 days of nausea and vomiting. He states of having a recent sonogram for RUQ.  Admit of still drinking alcohol however states it was a few beers. Denies fever, sob, chest pain, hematemesis, diarrhea, dysuria. 53 y/o m with h/o pancreatitis, HTN, HLD , splenic abnormalities, alcoholism , CAD , cardiac stent x 1  presents to ED c/o 2 days of nausea and vomiting. He states of having a recent sonogram for RUQ.  Admit of still drinking alcohol however states it was a few beers. Denies fever, sob, chest pain, hematemesis, diarrhea, dysuria. Sonogram negative.

## 2018-04-28 NOTE — ED PROVIDER NOTE - MEDICAL DECISION MAKING DETAILS
Patient with acute on chronic pancreatitis afebrile with labs stable.  Lipase wnl and pain better controlled. Tolerating PO. Recommend to f/u with GI and stop drinking. Risks of pancreatic cancer were discussed and alcoholism. Results of ct scan was discussed and urged to f/u.

## 2018-04-29 LAB
CULTURE RESULTS: NO GROWTH — SIGNIFICANT CHANGE UP
SPECIMEN SOURCE: SIGNIFICANT CHANGE UP

## 2018-04-30 LAB
ALBUMIN SERPL ELPH-MCNC: 4.3 G/DL
ALP BLD-CCNC: 158 U/L
ALT SERPL-CCNC: 61 U/L
AMYLASE/CREAT SERPL: 34 U/L
ANION GAP SERPL CALC-SCNC: 17 MMOL/L
AST SERPL-CCNC: 68 U/L
BILIRUB SERPL-MCNC: 0.8 MG/DL
BUN SERPL-MCNC: 10 MG/DL
CALCIUM SERPL-MCNC: 9.7 MG/DL
CHLORIDE SERPL-SCNC: 97 MMOL/L
CO2 SERPL-SCNC: 21 MMOL/L
CREAT SERPL-MCNC: 0.87 MG/DL
GGT SERPL-CCNC: 635 U/L
GLUCOSE SERPL-MCNC: 118 MG/DL
LPL SERPL-CCNC: 53 U/L
POTASSIUM SERPL-SCNC: 4.1 MMOL/L
PROT SERPL-MCNC: 7.2 G/DL
SODIUM SERPL-SCNC: 135 MMOL/L

## 2018-05-21 ENCOUNTER — APPOINTMENT (OUTPATIENT)
Dept: GASTROENTEROLOGY | Facility: CLINIC | Age: 53
End: 2018-05-21

## 2018-06-04 ENCOUNTER — APPOINTMENT (OUTPATIENT)
Dept: INTERNAL MEDICINE | Facility: CLINIC | Age: 53
End: 2018-06-04

## 2018-08-07 PROBLEM — F19.10 OTHER PSYCHOACTIVE SUBSTANCE ABUSE, UNCOMPLICATED: Chronic | Status: ACTIVE | Noted: 2017-01-30

## 2018-08-16 ENCOUNTER — APPOINTMENT (OUTPATIENT)
Dept: INTERNAL MEDICINE | Facility: CLINIC | Age: 53
End: 2018-08-16

## 2018-10-05 ENCOUNTER — MOBILE ON CALL (OUTPATIENT)
Age: 53
End: 2018-10-05

## 2018-12-28 ENCOUNTER — MOBILE ON CALL (OUTPATIENT)
Age: 53
End: 2018-12-28

## 2018-12-31 ENCOUNTER — RX RENEWAL (OUTPATIENT)
Age: 53
End: 2018-12-31

## 2019-01-11 ENCOUNTER — MOBILE ON CALL (OUTPATIENT)
Age: 54
End: 2019-01-11

## 2019-01-15 ENCOUNTER — APPOINTMENT (OUTPATIENT)
Dept: INTERNAL MEDICINE | Facility: CLINIC | Age: 54
End: 2019-01-15

## 2019-02-08 ENCOUNTER — MOBILE ON CALL (OUTPATIENT)
Age: 54
End: 2019-02-08

## 2019-02-25 ENCOUNTER — APPOINTMENT (OUTPATIENT)
Dept: INTERNAL MEDICINE | Facility: CLINIC | Age: 54
End: 2019-02-25
Payer: SELF-PAY

## 2019-02-25 VITALS
WEIGHT: 215.25 LBS | DIASTOLIC BLOOD PRESSURE: 100 MMHG | TEMPERATURE: 98.2 F | OXYGEN SATURATION: 97 % | HEIGHT: 73 IN | SYSTOLIC BLOOD PRESSURE: 168 MMHG | BODY MASS INDEX: 28.53 KG/M2 | HEART RATE: 108 BPM

## 2019-02-25 DIAGNOSIS — Z12.11 ENCOUNTER FOR SCREENING FOR MALIGNANT NEOPLASM OF COLON: ICD-10-CM

## 2019-02-25 DIAGNOSIS — E78.00 PURE HYPERCHOLESTEROLEMIA, UNSPECIFIED: ICD-10-CM

## 2019-02-25 DIAGNOSIS — R10.11 RIGHT UPPER QUADRANT PAIN: ICD-10-CM

## 2019-02-25 DIAGNOSIS — E03.9 HYPOTHYROIDISM, UNSPECIFIED: ICD-10-CM

## 2019-02-25 DIAGNOSIS — D73.89 OTHER DISEASES OF SPLEEN: ICD-10-CM

## 2019-02-25 DIAGNOSIS — Z00.00 ENCOUNTER FOR GENERAL ADULT MEDICAL EXAMINATION W/OUT ABNORMAL FINDINGS: ICD-10-CM

## 2019-02-25 PROCEDURE — 99213 OFFICE O/P EST LOW 20 MIN: CPT | Mod: 25

## 2019-02-25 PROCEDURE — 99406 BEHAV CHNG SMOKING 3-10 MIN: CPT | Mod: 25

## 2019-02-25 PROCEDURE — G0438: CPT

## 2019-02-25 PROCEDURE — 93000 ELECTROCARDIOGRAM COMPLETE: CPT

## 2019-02-25 PROCEDURE — 36415 COLL VENOUS BLD VENIPUNCTURE: CPT

## 2019-02-25 PROCEDURE — G0442 ANNUAL ALCOHOL SCREEN 15 MIN: CPT | Mod: 59

## 2019-02-26 LAB
25(OH)D3 SERPL-MCNC: 10.3 NG/ML
ALBUMIN SERPL ELPH-MCNC: 4.6 G/DL
ALP BLD-CCNC: 121 U/L
ALT SERPL-CCNC: 56 U/L
ANION GAP SERPL CALC-SCNC: 17 MMOL/L
AST SERPL-CCNC: 49 U/L
BASOPHILS # BLD AUTO: 0.13 K/UL
BASOPHILS NFR BLD AUTO: 1.1 %
BILIRUB SERPL-MCNC: 0.4 MG/DL
BUN SERPL-MCNC: 13 MG/DL
CALCIUM SERPL-MCNC: 9.9 MG/DL
CHLORIDE SERPL-SCNC: 99 MMOL/L
CHOLEST SERPL-MCNC: 187 MG/DL
CHOLEST/HDLC SERPL: 3.4 RATIO
CK SERPL-CCNC: 95 U/L
CO2 SERPL-SCNC: 24 MMOL/L
CREAT SERPL-MCNC: 0.72 MG/DL
EOSINOPHIL # BLD AUTO: 0.25 K/UL
EOSINOPHIL NFR BLD AUTO: 2.1 %
GLUCOSE SERPL-MCNC: 122 MG/DL
HBA1C MFR BLD HPLC: 6 %
HCT VFR BLD CALC: 47.1 %
HDLC SERPL-MCNC: 55 MG/DL
HGB BLD-MCNC: 14.6 G/DL
IMM GRANULOCYTES NFR BLD AUTO: 0.3 %
LDLC SERPL CALC-MCNC: 88 MG/DL
LYMPHOCYTES # BLD AUTO: 2.66 K/UL
LYMPHOCYTES NFR BLD AUTO: 22.6 %
MAN DIFF?: NORMAL
MCHC RBC-ENTMCNC: 31 GM/DL
MCHC RBC-ENTMCNC: 31.9 PG
MCV RBC AUTO: 102.8 FL
MONOCYTES # BLD AUTO: 0.72 K/UL
MONOCYTES NFR BLD AUTO: 6.1 %
NEUTROPHILS # BLD AUTO: 7.99 K/UL
NEUTROPHILS NFR BLD AUTO: 67.8 %
PLATELET # BLD AUTO: 197 K/UL
POTASSIUM SERPL-SCNC: 4.5 MMOL/L
PROT SERPL-MCNC: 7.9 G/DL
PSA FREE SERPL-MCNC: 0.13 NG/ML
RBC # BLD: 4.58 M/UL
RBC # FLD: 12.7 %
SODIUM SERPL-SCNC: 140 MMOL/L
T3 SERPL-MCNC: 131 NG/DL
T4 FREE SERPL-MCNC: 1 NG/DL
TRIGL SERPL-MCNC: 222 MG/DL
TSH SERPL-ACNC: 1.59 UIU/ML
VIT B12 SERPL-MCNC: 291 PG/ML
WBC # FLD AUTO: 11.78 K/UL

## 2019-03-12 ENCOUNTER — APPOINTMENT (OUTPATIENT)
Dept: INTERNAL MEDICINE | Facility: CLINIC | Age: 54
End: 2019-03-12
Payer: SELF-PAY

## 2019-03-12 VITALS
HEIGHT: 73 IN | WEIGHT: 215 LBS | BODY MASS INDEX: 28.49 KG/M2 | DIASTOLIC BLOOD PRESSURE: 100 MMHG | SYSTOLIC BLOOD PRESSURE: 172 MMHG

## 2019-03-12 VITALS — DIASTOLIC BLOOD PRESSURE: 96 MMHG | SYSTOLIC BLOOD PRESSURE: 156 MMHG

## 2019-03-12 DIAGNOSIS — R09.82 POSTNASAL DRIP: ICD-10-CM

## 2019-03-12 PROCEDURE — 96372 THER/PROPH/DIAG INJ SC/IM: CPT

## 2019-03-12 PROCEDURE — 99214 OFFICE O/P EST MOD 30 MIN: CPT | Mod: 25

## 2019-03-12 RX ORDER — HYDROCHLOROTHIAZIDE 12.5 MG/1
12.5 TABLET ORAL
Qty: 30 | Refills: 0 | Status: COMPLETED | COMMUNITY
Start: 2018-12-31

## 2019-03-12 RX ORDER — NICOTINE POLACRILEX 4 MG/1
4 GUM, CHEWING ORAL
Qty: 1 | Refills: 3 | Status: ACTIVE | COMMUNITY
Start: 2017-01-26 | End: 1900-01-01

## 2019-03-12 RX ORDER — CYANOCOBALAMIN 1000 UG/ML
1000 INJECTION INTRAMUSCULAR; SUBCUTANEOUS
Qty: 0 | Refills: 0 | Status: COMPLETED | OUTPATIENT
Start: 2019-03-12

## 2019-03-12 RX ORDER — FLUTICASONE PROPIONATE 50 UG/1
50 SPRAY, METERED NASAL DAILY
Qty: 1 | Refills: 4 | Status: ACTIVE | COMMUNITY
Start: 2019-03-12 | End: 1900-01-01

## 2019-03-12 RX ADMIN — CYANOCOBALAMIN 0 MCG/ML: 1000 INJECTION INTRAMUSCULAR; SUBCUTANEOUS at 00:00

## 2019-03-12 NOTE — REVIEW OF SYSTEMS
[Postnasal Drip] : postnasal drip [Nasal Discharge] : nasal discharge [Hoarseness] : hoarseness [Heartburn] : heartburn [Negative] : Heme/Lymph

## 2019-03-12 NOTE — PHYSICAL EXAM
[No Acute Distress] : no acute distress [Well Nourished] : well nourished [Normal Sclera/Conjunctiva] : normal sclera/conjunctiva [Normal Outer Ear/Nose] : the outer ears and nose were normal in appearance [No JVD] : no jugular venous distention [No Respiratory Distress] : no respiratory distress  [Normal Rate] : normal rate  [No Edema] : there was no peripheral edema [No Rash] : no rash [Normal Gait] : normal gait [Alert and Oriented x3] : oriented to person, place, and time [de-identified] : ashlieos

## 2019-03-12 NOTE — HISTORY OF PRESENT ILLNESS
[de-identified] : 54 y/o man is here for f/u.\par He was diagnosed with B12 deficiency at [facility]-needs to start injections.\par His insurance didn't cover the nicorette but he wants to quit. He asks me to "Send it again."\par His "postnasal drip" is making it difficult to sleep. He knows its "from the smoking" as the last time he quit, it resolved.\par Took BP meds as directed today.\par

## 2019-03-12 NOTE — ASSESSMENT
[FreeTextEntry1] : -B12 injection given\par -Needs better BP control. increase ACEI to lisinopril 40 mg. F/u 1 month. if no change, add chlorthalidone.\par -Nicorette renewed. Not sure why he thinks sending it again will get it covered but i did anyway\par -Flonase started.

## 2019-04-16 ENCOUNTER — APPOINTMENT (OUTPATIENT)
Dept: INTERNAL MEDICINE | Facility: CLINIC | Age: 54
End: 2019-04-16
Payer: SELF-PAY

## 2019-04-16 VITALS — SYSTOLIC BLOOD PRESSURE: 130 MMHG | HEART RATE: 89 BPM | DIASTOLIC BLOOD PRESSURE: 82 MMHG

## 2019-04-16 DIAGNOSIS — E53.8 DEFICIENCY OF OTHER SPECIFIED B GROUP VITAMINS: ICD-10-CM

## 2019-04-16 DIAGNOSIS — I10 ESSENTIAL (PRIMARY) HYPERTENSION: ICD-10-CM

## 2019-04-16 DIAGNOSIS — F17.200 NICOTINE DEPENDENCE, UNSPECIFIED, UNCOMPLICATED: ICD-10-CM

## 2019-04-16 PROCEDURE — 96372 THER/PROPH/DIAG INJ SC/IM: CPT

## 2019-04-16 PROCEDURE — 99213 OFFICE O/P EST LOW 20 MIN: CPT | Mod: 25

## 2019-04-16 RX ORDER — LISINOPRIL 20 MG/1
20 TABLET ORAL DAILY
Qty: 90 | Refills: 1 | Status: ACTIVE | COMMUNITY
Start: 2019-04-16 | End: 1900-01-01

## 2019-04-16 RX ORDER — DILTIAZEM HYDROCHLORIDE 120 MG/1
120 CAPSULE, EXTENDED RELEASE ORAL
Qty: 30 | Refills: 3 | Status: COMPLETED | COMMUNITY
Start: 2017-11-08 | End: 2019-04-16

## 2019-04-16 RX ORDER — CYANOCOBALAMIN 1000 UG/ML
1000 INJECTION INTRAMUSCULAR; SUBCUTANEOUS
Qty: 0 | Refills: 0 | Status: COMPLETED | OUTPATIENT
Start: 2019-04-16

## 2019-04-16 RX ORDER — DILTIAZEM HYDROCHLORIDE 120 MG/1
120 CAPSULE, EXTENDED RELEASE ORAL DAILY
Qty: 90 | Refills: 2 | Status: ACTIVE | COMMUNITY
Start: 2019-04-16 | End: 1900-01-01

## 2019-04-16 RX ADMIN — CYANOCOBALAMIN 0 MCG/ML: 1000 INJECTION INTRAMUSCULAR; SUBCUTANEOUS at 00:00

## 2019-04-16 NOTE — ASSESSMENT
[FreeTextEntry1] : BP currently ok on Lisinopril 20 mg. Meds refilled.\par B12 injection given.\par Encouraged to  script for gum.\par

## 2019-04-16 NOTE — HISTORY OF PRESENT ILLNESS
[de-identified] : 54 y/o man with multuple medical problems including B12 deficiency is here for IM injection.\par Last month, we increased ACEI to 40 mg. He gave "it a shot" for 1.5 weeks but it made him dizzy so he went back to original dose of Lisinopril 20 mg. He needs refills of BP meds. Dizziness gone.\par No other complaints.\par Still smoking. Wants to do gum but Medicaid isn't covering. Has a hearing scheduled. Script pending at pharmacy.

## 2019-05-21 ENCOUNTER — APPOINTMENT (OUTPATIENT)
Dept: INTERNAL MEDICINE | Facility: CLINIC | Age: 54
End: 2019-05-21

## 2019-06-01 ENCOUNTER — INPATIENT (INPATIENT)
Facility: HOSPITAL | Age: 54
LOS: 2 days | Discharge: HOME | DRG: 439 | End: 2019-06-04
Attending: GENERAL ACUTE CARE HOSPITAL | Admitting: GENERAL ACUTE CARE HOSPITAL
Payer: MEDICARE

## 2019-06-01 VITALS
SYSTOLIC BLOOD PRESSURE: 105 MMHG | RESPIRATION RATE: 18 BRPM | OXYGEN SATURATION: 97 % | WEIGHT: 220.02 LBS | DIASTOLIC BLOOD PRESSURE: 64 MMHG | HEART RATE: 117 BPM | TEMPERATURE: 98 F

## 2019-06-01 LAB
ALBUMIN SERPL ELPH-MCNC: 3.7 G/DL — SIGNIFICANT CHANGE UP (ref 3.3–5)
ALBUMIN SERPL ELPH-MCNC: 4.1 G/DL — SIGNIFICANT CHANGE UP (ref 3.3–5)
ALP SERPL-CCNC: 102 U/L — SIGNIFICANT CHANGE UP (ref 40–120)
ALP SERPL-CCNC: 116 U/L — SIGNIFICANT CHANGE UP (ref 40–120)
ALT FLD-CCNC: 27 U/L — SIGNIFICANT CHANGE UP (ref 10–45)
ALT FLD-CCNC: 29 U/L — SIGNIFICANT CHANGE UP (ref 10–45)
ANION GAP SERPL CALC-SCNC: 14 MMOL/L — SIGNIFICANT CHANGE UP (ref 5–17)
ANION GAP SERPL CALC-SCNC: 18 MMOL/L — HIGH (ref 5–17)
APPEARANCE UR: CLEAR — SIGNIFICANT CHANGE UP
AST SERPL-CCNC: 31 U/L — SIGNIFICANT CHANGE UP (ref 10–40)
AST SERPL-CCNC: 35 U/L — SIGNIFICANT CHANGE UP (ref 10–40)
BASOPHILS # BLD AUTO: 0.05 K/UL — SIGNIFICANT CHANGE UP (ref 0–0.2)
BASOPHILS # BLD AUTO: 0.09 K/UL — SIGNIFICANT CHANGE UP (ref 0–0.2)
BASOPHILS NFR BLD AUTO: 0.3 % — SIGNIFICANT CHANGE UP (ref 0–2)
BASOPHILS NFR BLD AUTO: 0.5 % — SIGNIFICANT CHANGE UP (ref 0–2)
BILIRUB SERPL-MCNC: 2.1 MG/DL — HIGH (ref 0.2–1.2)
BILIRUB SERPL-MCNC: 2.5 MG/DL — HIGH (ref 0.2–1.2)
BILIRUB UR-MCNC: NEGATIVE — SIGNIFICANT CHANGE UP
BUN SERPL-MCNC: 12 MG/DL — SIGNIFICANT CHANGE UP (ref 7–23)
BUN SERPL-MCNC: 14 MG/DL — SIGNIFICANT CHANGE UP (ref 7–23)
CALCIUM SERPL-MCNC: 8.5 MG/DL — SIGNIFICANT CHANGE UP (ref 8.4–10.5)
CALCIUM SERPL-MCNC: 9 MG/DL — SIGNIFICANT CHANGE UP (ref 8.4–10.5)
CHLORIDE SERPL-SCNC: 85 MMOL/L — LOW (ref 96–108)
CHLORIDE SERPL-SCNC: 92 MMOL/L — LOW (ref 96–108)
CO2 SERPL-SCNC: 23 MMOL/L — SIGNIFICANT CHANGE UP (ref 22–31)
CO2 SERPL-SCNC: 24 MMOL/L — SIGNIFICANT CHANGE UP (ref 22–31)
COLOR SPEC: YELLOW — SIGNIFICANT CHANGE UP
CREAT SERPL-MCNC: 1.1 MG/DL — SIGNIFICANT CHANGE UP (ref 0.5–1.3)
CREAT SERPL-MCNC: 1.38 MG/DL — HIGH (ref 0.5–1.3)
DIFF PNL FLD: NEGATIVE — SIGNIFICANT CHANGE UP
EOSINOPHIL # BLD AUTO: 0.14 K/UL — SIGNIFICANT CHANGE UP (ref 0–0.5)
EOSINOPHIL # BLD AUTO: 0.16 K/UL — SIGNIFICANT CHANGE UP (ref 0–0.5)
EOSINOPHIL NFR BLD AUTO: 0.9 % — SIGNIFICANT CHANGE UP (ref 0–6)
EOSINOPHIL NFR BLD AUTO: 0.9 % — SIGNIFICANT CHANGE UP (ref 0–6)
EXTRA URINE TUBE: SIGNIFICANT CHANGE UP
GLUCOSE SERPL-MCNC: 131 MG/DL — HIGH (ref 70–99)
GLUCOSE SERPL-MCNC: 155 MG/DL — HIGH (ref 70–99)
GLUCOSE UR QL: NEGATIVE — SIGNIFICANT CHANGE UP
HCT VFR BLD CALC: 40.2 % — SIGNIFICANT CHANGE UP (ref 39–50)
HCT VFR BLD CALC: 41.3 % — SIGNIFICANT CHANGE UP (ref 39–50)
HGB BLD-MCNC: 14 G/DL — SIGNIFICANT CHANGE UP (ref 13–17)
HGB BLD-MCNC: 14.5 G/DL — SIGNIFICANT CHANGE UP (ref 13–17)
IMM GRANULOCYTES NFR BLD AUTO: 0.7 % — SIGNIFICANT CHANGE UP (ref 0–1.5)
IMM GRANULOCYTES NFR BLD AUTO: 0.8 % — SIGNIFICANT CHANGE UP (ref 0–1.5)
KETONES UR-MCNC: NEGATIVE — SIGNIFICANT CHANGE UP
LACTATE SERPL-SCNC: 1.6 MMOL/L — SIGNIFICANT CHANGE UP (ref 0.5–2)
LEUKOCYTE ESTERASE UR-ACNC: NEGATIVE — SIGNIFICANT CHANGE UP
LYMPHOCYTES # BLD AUTO: 1.4 K/UL — SIGNIFICANT CHANGE UP (ref 1–3.3)
LYMPHOCYTES # BLD AUTO: 1.44 K/UL — SIGNIFICANT CHANGE UP (ref 1–3.3)
LYMPHOCYTES # BLD AUTO: 7.9 % — LOW (ref 13–44)
LYMPHOCYTES # BLD AUTO: 9 % — LOW (ref 13–44)
MCHC RBC-ENTMCNC: 31.9 PG — SIGNIFICANT CHANGE UP (ref 27–34)
MCHC RBC-ENTMCNC: 32.2 PG — SIGNIFICANT CHANGE UP (ref 27–34)
MCHC RBC-ENTMCNC: 34.8 GM/DL — SIGNIFICANT CHANGE UP (ref 32–36)
MCHC RBC-ENTMCNC: 35.1 GM/DL — SIGNIFICANT CHANGE UP (ref 32–36)
MCV RBC AUTO: 91 FL — SIGNIFICANT CHANGE UP (ref 80–100)
MCV RBC AUTO: 92.4 FL — SIGNIFICANT CHANGE UP (ref 80–100)
MONOCYTES # BLD AUTO: 1.33 K/UL — HIGH (ref 0–0.9)
MONOCYTES # BLD AUTO: 1.47 K/UL — HIGH (ref 0–0.9)
MONOCYTES NFR BLD AUTO: 8.3 % — SIGNIFICANT CHANGE UP (ref 2–14)
MONOCYTES NFR BLD AUTO: 8.3 % — SIGNIFICANT CHANGE UP (ref 2–14)
NEUTROPHILS # BLD AUTO: 12.89 K/UL — HIGH (ref 1.8–7.4)
NEUTROPHILS # BLD AUTO: 14.5 K/UL — HIGH (ref 1.8–7.4)
NEUTROPHILS NFR BLD AUTO: 80.7 % — HIGH (ref 43–77)
NEUTROPHILS NFR BLD AUTO: 81.7 % — HIGH (ref 43–77)
NITRITE UR-MCNC: NEGATIVE — SIGNIFICANT CHANGE UP
NRBC # BLD: 0 /100 WBCS — SIGNIFICANT CHANGE UP (ref 0–0)
NRBC # BLD: 0 /100 WBCS — SIGNIFICANT CHANGE UP (ref 0–0)
PCP SPEC-MCNC: SIGNIFICANT CHANGE UP
PH UR: 5.5 — SIGNIFICANT CHANGE UP (ref 5–8)
PLATELET # BLD AUTO: 116 K/UL — LOW (ref 150–400)
PLATELET # BLD AUTO: 132 K/UL — LOW (ref 150–400)
POTASSIUM SERPL-MCNC: 3.2 MMOL/L — LOW (ref 3.5–5.3)
POTASSIUM SERPL-MCNC: 3.3 MMOL/L — LOW (ref 3.5–5.3)
POTASSIUM SERPL-SCNC: 3.2 MMOL/L — LOW (ref 3.5–5.3)
POTASSIUM SERPL-SCNC: 3.3 MMOL/L — LOW (ref 3.5–5.3)
PROT SERPL-MCNC: 7.3 G/DL — SIGNIFICANT CHANGE UP (ref 6–8.3)
PROT SERPL-MCNC: 7.7 G/DL — SIGNIFICANT CHANGE UP (ref 6–8.3)
PROT UR-MCNC: NEGATIVE MG/DL — SIGNIFICANT CHANGE UP
RAPID RVP RESULT: DETECTED
RBC # BLD: 4.35 M/UL — SIGNIFICANT CHANGE UP (ref 4.2–5.8)
RBC # BLD: 4.54 M/UL — SIGNIFICANT CHANGE UP (ref 4.2–5.8)
RBC # FLD: 12.3 % — SIGNIFICANT CHANGE UP (ref 10.3–14.5)
RBC # FLD: 12.4 % — SIGNIFICANT CHANGE UP (ref 10.3–14.5)
RV+EV RNA SPEC QL NAA+PROBE: DETECTED
SODIUM SERPL-SCNC: 126 MMOL/L — LOW (ref 135–145)
SODIUM SERPL-SCNC: 130 MMOL/L — LOW (ref 135–145)
SP GR SPEC: <=1.005 — SIGNIFICANT CHANGE UP (ref 1–1.03)
TROPONIN T SERPL-MCNC: <0.01 NG/ML — SIGNIFICANT CHANGE UP (ref 0–0.01)
UROBILINOGEN FLD QL: 0.2 E.U./DL — SIGNIFICANT CHANGE UP
WBC # BLD: 15.97 K/UL — HIGH (ref 3.8–10.5)
WBC # BLD: 17.75 K/UL — HIGH (ref 3.8–10.5)
WBC # FLD AUTO: 15.97 K/UL — HIGH (ref 3.8–10.5)
WBC # FLD AUTO: 17.75 K/UL — HIGH (ref 3.8–10.5)

## 2019-06-01 PROCEDURE — 99285 EMERGENCY DEPT VISIT HI MDM: CPT | Mod: 25

## 2019-06-01 PROCEDURE — 74177 CT ABD & PELVIS W/CONTRAST: CPT | Mod: 26

## 2019-06-01 PROCEDURE — 76705 ECHO EXAM OF ABDOMEN: CPT | Mod: 26

## 2019-06-01 RX ORDER — ONDANSETRON 8 MG/1
4 TABLET, FILM COATED ORAL EVERY 6 HOURS
Refills: 0 | Status: DISCONTINUED | OUTPATIENT
Start: 2019-06-01 | End: 2019-06-04

## 2019-06-01 RX ORDER — ACETAMINOPHEN 500 MG
975 TABLET ORAL ONCE
Refills: 0 | Status: COMPLETED | OUTPATIENT
Start: 2019-06-01 | End: 2019-06-01

## 2019-06-01 RX ORDER — MORPHINE SULFATE 50 MG/1
4 CAPSULE, EXTENDED RELEASE ORAL ONCE
Refills: 0 | Status: DISCONTINUED | OUTPATIENT
Start: 2019-06-01 | End: 2019-06-01

## 2019-06-01 RX ORDER — POTASSIUM CHLORIDE 20 MEQ
10 PACKET (EA) ORAL
Refills: 0 | Status: COMPLETED | OUTPATIENT
Start: 2019-06-01 | End: 2019-06-01

## 2019-06-01 RX ORDER — SODIUM CHLORIDE 9 MG/ML
1000 INJECTION INTRAMUSCULAR; INTRAVENOUS; SUBCUTANEOUS
Refills: 0 | Status: DISCONTINUED | OUTPATIENT
Start: 2019-06-01 | End: 2019-06-03

## 2019-06-01 RX ORDER — POTASSIUM CHLORIDE 20 MEQ
40 PACKET (EA) ORAL ONCE
Refills: 0 | Status: COMPLETED | OUTPATIENT
Start: 2019-06-01 | End: 2019-06-01

## 2019-06-01 RX ORDER — SODIUM CHLORIDE 9 MG/ML
2000 INJECTION INTRAMUSCULAR; INTRAVENOUS; SUBCUTANEOUS ONCE
Refills: 0 | Status: COMPLETED | OUTPATIENT
Start: 2019-06-01 | End: 2019-06-01

## 2019-06-01 RX ORDER — IOHEXOL 300 MG/ML
30 INJECTION, SOLUTION INTRAVENOUS ONCE
Refills: 0 | Status: COMPLETED | OUTPATIENT
Start: 2019-06-01 | End: 2019-06-01

## 2019-06-01 RX ORDER — POTASSIUM PHOSPHATE, MONOBASIC POTASSIUM PHOSPHATE, DIBASIC 236; 224 MG/ML; MG/ML
15 INJECTION, SOLUTION INTRAVENOUS ONCE
Refills: 0 | Status: COMPLETED | OUTPATIENT
Start: 2019-06-01 | End: 2019-06-01

## 2019-06-01 RX ORDER — PIPERACILLIN AND TAZOBACTAM 4; .5 G/20ML; G/20ML
3.38 INJECTION, POWDER, LYOPHILIZED, FOR SOLUTION INTRAVENOUS ONCE
Refills: 0 | Status: COMPLETED | OUTPATIENT
Start: 2019-06-01 | End: 2019-06-01

## 2019-06-01 RX ORDER — HYDROMORPHONE HYDROCHLORIDE 2 MG/ML
0.5 INJECTION INTRAMUSCULAR; INTRAVENOUS; SUBCUTANEOUS EVERY 4 HOURS
Refills: 0 | Status: DISCONTINUED | OUTPATIENT
Start: 2019-06-01 | End: 2019-06-04

## 2019-06-01 RX ORDER — NICOTINE POLACRILEX 2 MG
1 GUM BUCCAL DAILY
Refills: 0 | Status: DISCONTINUED | OUTPATIENT
Start: 2019-06-01 | End: 2019-06-03

## 2019-06-01 RX ORDER — HEPARIN SODIUM 5000 [USP'U]/ML
5000 INJECTION INTRAVENOUS; SUBCUTANEOUS EVERY 8 HOURS
Refills: 0 | Status: DISCONTINUED | OUTPATIENT
Start: 2019-06-01 | End: 2019-06-04

## 2019-06-01 RX ORDER — PIPERACILLIN AND TAZOBACTAM 4; .5 G/20ML; G/20ML
3.38 INJECTION, POWDER, LYOPHILIZED, FOR SOLUTION INTRAVENOUS EVERY 6 HOURS
Refills: 0 | Status: DISCONTINUED | OUTPATIENT
Start: 2019-06-01 | End: 2019-06-04

## 2019-06-01 RX ORDER — METOCLOPRAMIDE HCL 10 MG
10 TABLET ORAL ONCE
Refills: 0 | Status: COMPLETED | OUTPATIENT
Start: 2019-06-01 | End: 2019-06-01

## 2019-06-01 RX ORDER — HYDROMORPHONE HYDROCHLORIDE 2 MG/ML
1 INJECTION INTRAMUSCULAR; INTRAVENOUS; SUBCUTANEOUS EVERY 4 HOURS
Refills: 0 | Status: DISCONTINUED | OUTPATIENT
Start: 2019-06-01 | End: 2019-06-04

## 2019-06-01 RX ORDER — MAGNESIUM SULFATE 500 MG/ML
2 VIAL (ML) INJECTION ONCE
Refills: 0 | Status: COMPLETED | OUTPATIENT
Start: 2019-06-01 | End: 2019-06-01

## 2019-06-01 RX ADMIN — HYDROMORPHONE HYDROCHLORIDE 0.5 MILLIGRAM(S): 2 INJECTION INTRAMUSCULAR; INTRAVENOUS; SUBCUTANEOUS at 19:53

## 2019-06-01 RX ADMIN — HYDROMORPHONE HYDROCHLORIDE 1 MILLIGRAM(S): 2 INJECTION INTRAMUSCULAR; INTRAVENOUS; SUBCUTANEOUS at 18:00

## 2019-06-01 RX ADMIN — HEPARIN SODIUM 5000 UNIT(S): 5000 INJECTION INTRAVENOUS; SUBCUTANEOUS at 21:59

## 2019-06-01 RX ADMIN — SODIUM CHLORIDE 2000 MILLILITER(S): 9 INJECTION INTRAMUSCULAR; INTRAVENOUS; SUBCUTANEOUS at 08:02

## 2019-06-01 RX ADMIN — HYDROMORPHONE HYDROCHLORIDE 1 MILLIGRAM(S): 2 INJECTION INTRAMUSCULAR; INTRAVENOUS; SUBCUTANEOUS at 12:29

## 2019-06-01 RX ADMIN — MORPHINE SULFATE 4 MILLIGRAM(S): 50 CAPSULE, EXTENDED RELEASE ORAL at 08:30

## 2019-06-01 RX ADMIN — Medication 100 MILLIEQUIVALENT(S): at 16:36

## 2019-06-01 RX ADMIN — HYDROMORPHONE HYDROCHLORIDE 1 MILLIGRAM(S): 2 INJECTION INTRAMUSCULAR; INTRAVENOUS; SUBCUTANEOUS at 22:30

## 2019-06-01 RX ADMIN — Medication 50 GRAM(S): at 14:11

## 2019-06-01 RX ADMIN — Medication 40 MILLIEQUIVALENT(S): at 08:49

## 2019-06-01 RX ADMIN — HYDROMORPHONE HYDROCHLORIDE 1 MILLIGRAM(S): 2 INJECTION INTRAMUSCULAR; INTRAVENOUS; SUBCUTANEOUS at 21:59

## 2019-06-01 RX ADMIN — HYDROMORPHONE HYDROCHLORIDE 1 MILLIGRAM(S): 2 INJECTION INTRAMUSCULAR; INTRAVENOUS; SUBCUTANEOUS at 13:00

## 2019-06-01 RX ADMIN — HYDROMORPHONE HYDROCHLORIDE 1 MILLIGRAM(S): 2 INJECTION INTRAMUSCULAR; INTRAVENOUS; SUBCUTANEOUS at 17:45

## 2019-06-01 RX ADMIN — HYDROMORPHONE HYDROCHLORIDE 0.5 MILLIGRAM(S): 2 INJECTION INTRAMUSCULAR; INTRAVENOUS; SUBCUTANEOUS at 20:20

## 2019-06-01 RX ADMIN — HYDROMORPHONE HYDROCHLORIDE 0.5 MILLIGRAM(S): 2 INJECTION INTRAMUSCULAR; INTRAVENOUS; SUBCUTANEOUS at 15:25

## 2019-06-01 RX ADMIN — Medication 100 MILLIEQUIVALENT(S): at 15:35

## 2019-06-01 RX ADMIN — PIPERACILLIN AND TAZOBACTAM 200 GRAM(S): 4; .5 INJECTION, POWDER, LYOPHILIZED, FOR SOLUTION INTRAVENOUS at 18:18

## 2019-06-01 RX ADMIN — Medication 975 MILLIGRAM(S): at 10:30

## 2019-06-01 RX ADMIN — HYDROMORPHONE HYDROCHLORIDE 0.5 MILLIGRAM(S): 2 INJECTION INTRAMUSCULAR; INTRAVENOUS; SUBCUTANEOUS at 15:10

## 2019-06-01 RX ADMIN — Medication 975 MILLIGRAM(S): at 10:09

## 2019-06-01 RX ADMIN — IOHEXOL 30 MILLILITER(S): 300 INJECTION, SOLUTION INTRAVENOUS at 08:01

## 2019-06-01 RX ADMIN — MORPHINE SULFATE 4 MILLIGRAM(S): 50 CAPSULE, EXTENDED RELEASE ORAL at 08:01

## 2019-06-01 RX ADMIN — HEPARIN SODIUM 5000 UNIT(S): 5000 INJECTION INTRAVENOUS; SUBCUTANEOUS at 14:11

## 2019-06-01 RX ADMIN — Medication 104 MILLIGRAM(S): at 08:01

## 2019-06-01 RX ADMIN — SODIUM CHLORIDE 200 MILLILITER(S): 9 INJECTION INTRAMUSCULAR; INTRAVENOUS; SUBCUTANEOUS at 08:02

## 2019-06-01 RX ADMIN — POTASSIUM PHOSPHATE, MONOBASIC POTASSIUM PHOSPHATE, DIBASIC 63.75 MILLIMOLE(S): 236; 224 INJECTION, SOLUTION INTRAVENOUS at 15:01

## 2019-06-01 RX ADMIN — Medication 100 MILLIEQUIVALENT(S): at 14:49

## 2019-06-01 RX ADMIN — PIPERACILLIN AND TAZOBACTAM 200 GRAM(S): 4; .5 INJECTION, POWDER, LYOPHILIZED, FOR SOLUTION INTRAVENOUS at 12:30

## 2019-06-01 NOTE — ED PROVIDER NOTE - ATTENDING CONTRIBUTION TO CARE
The pt is a 52 y/o M, who presents to ED c/o abd pain x few d. Pt states hx of spleenic hemorrhage in past, tx'd conservatively, alcoholic pancreatitis - states that has stopped drinking, cad, htn, hld.  Pain to entire upper abd, 8/10, constant, dull, + n/v/d, + fever - Tmax 101, feels weak and tired. Denies sob, cough, cp, flank pain, dysuria, hematuria.    Pt with pancreatitis with necrotizing ? abscess, suregery consulted and pt to be admitted to tele for further mgmt

## 2019-06-01 NOTE — CONSULT NOTE ADULT - ASSESSMENT
54 yo M, active smoker ( 40packs year), hx of alcohol abuse, CAD s/p angioplasty (years ago), HTN, chronic pancreatitis, hx of unprovoked splenic hemorrhage with collections, who presents with pancreatitis    Pancreatitis  -pt meets 2/3 criteria for diagnosis of pancreatitis- typical abd pain, CT findings of pancreatitis  -high suspicion for alcohol induced pancreatitis- given symptoms started after binge drinking  -please check triglyceride level  -bili elevated, with concern for gallstone. CT however shows no stones, no ductal dilation  -recommend abd US, MRCP to eval for ductal dilation, gallstones  -trend LFTS  -will determine need for ERCP based on further workup 54 yo M, active smoker ( 40packs year), hx of alcohol abuse, CAD s/p angioplasty (years ago), HTN, chronic pancreatitis, hx of unprovoked splenic hemorrhage with collections, who presents with pancreatitis    Pancreatitis  -pt meets 2/3 criteria for diagnosis of pancreatitis- typical abd pain, CT findings of pancreatitis  -high suspicion for alcohol induced pancreatitis- given symptoms started after binge drinking  -please check triglyceride level  -bili elevated, with concern for gallstone. CT however shows no stones, no ductal dilation  -recommend abd US, MRCP to eval for ductal dilation, gallstones  -IVF  -trend LFTS  -will determine need for ERCP based on further workup 54 yo M, active smoker ( 40packs year), hx of alcohol abuse, CAD s/p angioplasty (years ago), HTN, chronic pancreatitis, hx of unprovoked splenic hemorrhage with collections, who presents with pancreatitis    Pancreatitis  -pt meets 2/3 criteria for diagnosis of pancreatitis- typical abd pain, CT findings of pancreatitis  -high suspicion for alcohol induced pancreatitis- given symptoms started after binge drinking  -please check triglyceride level  -bili elevated. CT however shows no stones, no ductal dilation  -recommend abd US, MRCP to eval for ductal dilation, gallstones  -IVF  -trend LFTS  -will determine need for ERCP based on further workup 54 yo M, active smoker ( 40packs year), hx of alcohol abuse, CAD s/p angioplasty (years ago), HTN, chronic pancreatitis, hx of unprovoked splenic hemorrhage with collections, who presents with pancreatitis    Pancreatitis  -pt meets 2/3 criteria for diagnosis of pancreatitis- typical abd pain, CT findings of pancreatitis with concern for necrosis  -high suspicion for alcohol induced pancreatitis- given symptoms started after binge drinking  -please check triglyceride level  -bili elevated. CT however shows no stones, no ductal dilation  -recommend abd US, MRCP to eval for ductal dilation, gallstones  -IVF  -trend LFTS  -will determine need for ERCP based on further workup

## 2019-06-01 NOTE — ED PROVIDER NOTE - ENMT, MLM
Airway patent, Nasal mucosa clear. Mouth with normal mucosa. Throat has no vesicles, no oropharyngeal exudates and uvula is midline. no cervical lymphadenopathy b/l

## 2019-06-01 NOTE — H&P ADULT - NSICDXPASTMEDICALHX_GEN_ALL_CORE_FT
PAST MEDICAL HISTORY:  Angina pectoris Anginal pain    Atherosclerosis of coronary artery CAD (coronary artery disease)    Essential hypertension HTN (hypertension)    Nondependent alcohol abuse Alcohol abuse    Pancreatitis     Polysubstance abuse

## 2019-06-01 NOTE — ED PROVIDER NOTE - OBJECTIVE STATEMENT
The pt is a 54 y/o M, who presents to ED c/o abd pain The pt is a 52 y/o M, who presents to ED c/o abd pain x few d. Pt states hx of spleenic hemorrhage in past, tx'd conservatively, alcoholic pancreatitis - states that has stopped drinking, cad, htn, hld.  Pain to entire abd, 8/10, constant, dull, + n/v/d, + fever - Tmax 101, feels weak and tired. Denies sob, cough, cp, flank pain, dysuria, hematuria. The pt is a 52 y/o M, who presents to ED c/o abd pain x few d. Pt states hx of spleenic hemorrhage in past, tx'd conservatively, alcoholic pancreatitis - states that has stopped drinking, cad, htn, hld.  Pain to entire upper abd, 8/10, constant, dull, + n/v/d, + fever - Tmax 101, feels weak and tired. Denies sob, cough, cp, flank pain, dysuria, hematuria.

## 2019-06-01 NOTE — H&P ADULT - NSHPLABSRESULTS_GEN_ALL_CORE
LABS:                        14.0   15.97 )-----------( 116      ( 01 Jun 2019 07:57 )             40.2     06-01    130<L>  |  92<L>  |  12  ----------------------------<  131<H>  3.2<L>   |  24  |  1.10    Ca    8.5      01 Jun 2019 07:57    TPro  7.3  /  Alb  3.7  /  TBili  2.1<H>  /  DBili  x   /  AST  31  /  ALT  27  /  AlkPhos  102  06-01      Urinalysis Basic - ( 01 Jun 2019 08:03 )    Color: Yellow / Appearance: Clear / SG: <=1.005 / pH: x  Gluc: x / Ketone: NEGATIVE  / Bili: Negative / Urobili: 0.2 E.U./dL   Blood: x / Protein: NEGATIVE mg/dL / Nitrite: NEGATIVE   Leuk Esterase: NEGATIVE / RBC: x / WBC x   Sq Epi: x / Non Sq Epi: x / Bacteria: x    RADIOLOGY & ADDITIONAL STUDIES:    FINDINGS:     Images of the lower chest demonstrate coronary artery calcifications.    There is hepatomegaly and hepatic steatosis. The hepatic and portal veins   are patent. There is no focal hepatic lesion. No radiopaque stones are   seen in the gallbladder. There is inflammatory stranding centered about   the pancreatic tail, consistent with acute pancreatitis. Within the tip   of the pancreatic tail is a 2.9 x 2.7 x 2.8 cm heterogeneous rounded   lesion most likely represents an acute necrotic collection. There is a   short segment thrombus within the splenic vein (axial image 41 sagittal   image 39). There has been interval diminution in size of a now 4.3 x 2.0   cm subcapsular splenic collection.    Nodular thickening of the left adrenal gland again noted.. The kidneys   are normal in appearance. There is no hydronephrosis or perinephric   stranding.      No abdominal aortic aneurysm is seen. There are a few prominent   nonenlarged lymph nodes around the pancreatic tail..     Evaluation of the bowel demonstrates no evidence of obstruction. A normal   appendix is visualized. There is mild right wall thickening at the   splenic flexure. There are colonic diverticula without evidence of   diverticulitis. There is no ascites. A previously seen 1.4 cm low-density   lesion is not evident on this exam.    Images of the pelvis demonstrate the prostate and seminal vesicles to be   normal in appearance.  The urinary bladder is collapsed.    Evaluation of the osseous structures demonstrates bony ankylosis of the   bilateral sacroiliac joints.    IMPRESSION:  Acute pancreatitis centered around the pancreatic tail. A 2.9 cm   heterogeneous lesion within the pancreatic tail most likely represents an   acute necrotic collection. Interval follow-up is recommended to ensure   resolution/exclude an underlying lesion. There is short segment partial   splenic vein thrombosis.

## 2019-06-01 NOTE — H&P ADULT - NSICDXFAMILYHX_GEN_ALL_CORE_FT
FAMILY HISTORY:  Father  Still living? Unknown  Family history of mental disorder, Age at diagnosis: Age Unknown    Mother  Still living? Unknown  Family history of Hodgkin's lymphoma, Age at diagnosis: Age Unknown    Sibling  Still living? Unknown  Family history of diabetes mellitus, Age at diagnosis: Age Unknown

## 2019-06-01 NOTE — ED ADULT NURSE NOTE - OBJECTIVE STATEMENT
Received a 53 year old male with a chief complaint of abdominal pain with nausea, vomiting, dark colored urine. Secondary complaint of shortness of breath.

## 2019-06-01 NOTE — ED PROVIDER NOTE - CLINICAL SUMMARY MEDICAL DECISION MAKING FREE TEXT BOX
pt w/upper abd pain x few d, hx of pancreatitis - etoh induced but pt states that has not been drinking, very dehydrated upon arrival w/elevated creat and hyponatremia and hypokalemia - given ivf and electrolytes improved, K+ replenished, ct abd showed + pancreatitis w/? necrosis hence surg consulted and will admit for ivf, abx given, hemodynamically stable, symptoms controlled w/single dose of antiemetics and pain meds

## 2019-06-01 NOTE — H&P ADULT - HISTORY OF PRESENT ILLNESS
This is a 54 yo M, active smoker ( 40packs year), hx of alcohol abuse, CAD s/p angioplasty (years ago), HTN, chronic pancreatitis, hx of unprovoked splenic hemorrhage with collections (was admitted in 2017), patient presents with 4 days history of upper abdominal pain, nausea, vomiting, fever and chills, cough x4days.   Patient had BBQ for [employer] on Monday, drank 6 pack beers. He started to have 8/10 epigastric pain on Tuesday, associated with nausea and vomiting, He also noticed fever 100.9 at home and has been coughing more than usual (with yellowish sputum, no blood). Pain similar to his chronic pancreatitis pain.

## 2019-06-01 NOTE — H&P ADULT - ASSESSMENT
52 yo M active smoker and alcoholic, PMHx of HLD, HTN, CAD s/p angioplasty, Hx of splenic hemorrhage and subcapsular collection (resolved), now p/w acute pancreatitis with pancreatic tail collection. +leukocytosis and +enterorhino virus positive. T.Bili of 2.1 (baseline 0.7). To rule out necrotizing pancreatitis vs pseudocyst vs gall stone pancreatitis.     Plan :  Admit to  for [facility] floor  Pain and nausea control  IVF and NPO  [facility] TRO gall [facility] pancreatitis   MRCP STAT  US abdomen and pelvis STAT  IV.Zosyn STAT and Q6hr   DVT prophylaxis  AM labs with CMP  OOB/IS    Plan d/w Izzy Naranjo and seen with chief

## 2019-06-01 NOTE — ED ADULT TRIAGE NOTE - CHIEF COMPLAINT QUOTE
pt complaining of chest pain SOB nonproductive cough fever chills upper abd pain N/V x 5 episodes constipation and dark colored urine all symptoms started 4 days ago. Pt denies recent travel or sick contacts

## 2019-06-01 NOTE — H&P ADULT - NSHPPHYSICALEXAM_GEN_ALL_CORE
Vital Signs Last 24 Hrs  T(C): 37.8 (01 Jun 2019 08:40), Max: 37.8 (01 Jun 2019 08:40)  T(F): 100 (01 Jun 2019 08:40), Max: 100 (01 Jun 2019 08:40)  HR: 101 (01 Jun 2019 08:40) (90 - 117)  BP: 147/89 (01 Jun 2019 08:40) (105/64 - 147/89)  RR: 17 (01 Jun 2019 08:40) (16 - 18)  SpO2: 99% (01 Jun 2019 08:40) (97% - 99%)    General: NAD, resting comfortably in bed  C/V: NSR  Pulm: Nonlabored breathing, no respiratory distress  Abd: soft, tender at upper abdomen, no guarding, no rebound, not peritoneal, distended   Extrem: WWP, no edema.

## 2019-06-01 NOTE — CONSULT NOTE ADULT - SUBJECTIVE AND OBJECTIVE BOX
HPI:  This is a 54 yo M, active smoker ( 40packs year), hx of alcohol abuse, CAD s/p angioplasty (years ago), HTN, chronic pancreatitis, hx of unprovoked splenic hemorrhage with collections (was admitted in 2017), patient presents with 4 days history of upper abdominal pain, nausea, vomiting, fever and chills, cough x4days.   Patient had BBQ for [employer] on Monday, drank 6 pack beers. He started to have 8/10 epigastric pain on Tuesday, associated with nausea and vomiting, He also noticed fever 100.9 at home and has been coughing more than usual (with yellowish sputum, no blood). Pain similar to his chronic pancreatitis pain. (01 Jun 2019 11:50)    Allergies    No Known Allergies    Intolerances      Home Medications:  atorvastatin 40 mg oral tablet: 1 tab(s) orally once a day (at bedtime) (30 May 2017 17:10)  dilTIAZem 120 mg/24 hours oral capsule, extended release: 1 cap(s) orally once a day (30 May 2017 17:10)  lisinopril 20 mg oral tablet: 1 tab(s) orally once a day (20 Jan 2017 09:31)    MEDICATIONS:  MEDICATIONS  (STANDING):  heparin  Injectable 5000 Unit(s) SubCutaneous every 8 hours  piperacillin/tazobactam IVPB. 3.375 Gram(s) IV Intermittent every 6 hours  piperacillin/tazobactam IVPB. 3.375 Gram(s) IV Intermittent once  potassium chloride  10 mEq/100 mL IVPB 10 milliEquivalent(s) IV Intermittent every 1 hour  sodium chloride 0.9%. 1000 milliLiter(s) (200 mL/Hr) IV Continuous <Continuous>    MEDICATIONS  (PRN):  HYDROmorphone  Injectable 0.5 milliGRAM(s) IV Push every 4 hours PRN Moderate Pain (4 - 6)  HYDROmorphone  Injectable 1 milliGRAM(s) IV Push every 4 hours PRN Severe Pain (7 - 10)  ondansetron Injectable 4 milliGRAM(s) IV Push every 6 hours PRN Nausea    PAST MEDICAL & SURGICAL HISTORY:  Polysubstance abuse  Pancreatitis  Essential hypertension: HTN (hypertension)  Nondependent alcohol abuse: Alcohol abuse  Atherosclerosis of coronary artery: CAD (coronary artery disease)  Angina pectoris: Anginal pain  Status post percutaneous transluminal coronary angioplasty: S/P angioplasty    FAMILY HISTORY:  Family history of diabetes mellitus (Sibling)  Family history of Hodgkin's lymphoma (Mother)  Family history of mental disorder (Father): Family history of alcoholism    SOCIAL HISTORY:  [place]: [ ] Current, [ ] Former, [ ] Never; Pack Years:  Alcohol:  Illicit Drugs:    REVIEW OF SYSTEMS:  CONSTITUTIONAL: No weakness, fevers or chills  HEENT: No visual changes; No vertigo or throat pain   NECK: No pain or stiffness  RESPIRATORY: No cough, wheezing, hemoptysis; No shortness of breath  CARDIOVASCULAR: No chest pain or palpitations  GASTROINTESTINAL: No abdominal or epigastric pain. No nausea, vomiting, or hematemesis; No diarrhea or constipation. No melena or hematochezia.  GENITOURINARY: No dysuria, frequency or hematuria  NEUROLOGICAL: No numbness or weakness  SKIN: No itching, burning, rashes, or lesions   All other 10 review of systems is negative unless indicated above.    Vital Signs Last 24 Hrs  T(C): 37.8 (01 Jun 2019 08:40), Max: 37.8 (01 Jun 2019 08:40)  T(F): 100 (01 Jun 2019 08:40), Max: 100 (01 Jun 2019 08:40)  HR: 101 (01 Jun 2019 08:40) (90 - 117)  BP: 147/89 (01 Jun 2019 08:40) (105/64 - 147/89)  BP(mean): --  RR: 17 (01 Jun 2019 08:40) (16 - 18)  SpO2: 99% (01 Jun 2019 08:40) (97% - 99%)      PHYSICAL EXAM:    General: Well developed; well nourished; in no acute distress  Eyes: Anicteric sclerae, moist conjunctivae  HENT: Moist mucous membranes  Neck: Trachea midline, supple  Lungs: Normal respiratory effors and no intercostal retractions  Cardiovascular: RRR  Abdomen: Soft, non-tender non-distended; Normal bowel sounds; No rebound or guarding  Extremities: Normal range of motion, No clubbing, cyanosis or edema  Neurological: Alert and oriented x3  Skin: Warm and dry. No obvious rash    LABS:                        14.0   15.97 )-----------( 116      ( 01 Jun 2019 07:57 )             40.2     06-01    130<L>  |  92<L>  |  12  ----------------------------<  131<H>  3.2<L>   |  24  |  1.10    Ca    8.5      01 Jun 2019 07:57    TPro  7.3  /  Alb  3.7  /  TBili  2.1<H>  /  DBili  x   /  AST  31  /  ALT  27  /  AlkPhos  102  06-01            RADIOLOGY & ADDITIONAL STUDIES: HPI:  This is a 54 yo M, active smoker ( 40packs year), hx of alcohol abuse, CAD s/p angioplasty (years ago), HTN, chronic pancreatitis, hx of unprovoked splenic hemorrhage with collections (was admitted in 2017), patient presents with 4 days history of upper abdominal pain, nausea, vomiting, fever and chills, cough x4days.  Pt reports that on Monday he was at a party where he drank 6 beers. The next day, he developed abd pain, described as epigastric radiating to RUQ/LUQ, sharp, 7/10, associated with nausea and nonbilious emesis. Last BM this morning, watery, brown in color.    Pt reports two similar episodes in the past, in which he was told he had alcohol induced pancreatitis. He has thus tried to cut down on drinking, until recently. He reports fevers and coughs. No new medications. No recent travel. Has never been told he has gallstones    Allergies    No Known Allergies    Intolerances      Home Medications:  atorvastatin 40 mg oral tablet: 1 tab(s) orally once a day (at bedtime) (30 May 2017 17:10)  dilTIAZem 120 mg/24 hours oral capsule, extended release: 1 cap(s) orally once a day (30 May 2017 17:10)  lisinopril 20 mg oral tablet: 1 tab(s) orally once a day (20 Jan 2017 09:31)    MEDICATIONS:  MEDICATIONS  (STANDING):  heparin  Injectable 5000 Unit(s) SubCutaneous every 8 hours  piperacillin/tazobactam IVPB. 3.375 Gram(s) IV Intermittent every 6 hours  piperacillin/tazobactam IVPB. 3.375 Gram(s) IV Intermittent once  potassium chloride  10 mEq/100 mL IVPB 10 milliEquivalent(s) IV Intermittent every 1 hour  sodium chloride 0.9%. 1000 milliLiter(s) (200 mL/Hr) IV Continuous <Continuous>    MEDICATIONS  (PRN):  HYDROmorphone  Injectable 0.5 milliGRAM(s) IV Push every 4 hours PRN Moderate Pain (4 - 6)  HYDROmorphone  Injectable 1 milliGRAM(s) IV Push every 4 hours PRN Severe Pain (7 - 10)  ondansetron Injectable 4 milliGRAM(s) IV Push every 6 hours PRN Nausea    PAST MEDICAL & SURGICAL HISTORY:  Polysubstance abuse  Pancreatitis  Essential hypertension: HTN (hypertension)  Nondependent alcohol abuse: Alcohol abuse  Atherosclerosis of coronary artery: CAD (coronary artery disease)  Angina pectoris: Anginal pain  Status post percutaneous transluminal coronary angioplasty: S/P angioplasty    FAMILY HISTORY:  Family history of diabetes mellitus (Sibling)  Family history of Hodgkin's lymphoma (Mother)  Family history of mental disorder (Father): Family history of alcoholism    SOCIAL HISTORY:  [place]: current  Alcohol: current use  Illicit Drugs: denies    REVIEW OF SYSTEMS:  CONSTITUTIONAL:fevers  HEENT: No visual changes; No vertigo or throat pain   NECK: No pain or stiffness  RESPIRATORY: No cough, wheezing, hemoptysis; No shortness of breath  CARDIOVASCULAR: No chest pain or palpitations  GASTROINTESTINAL: abd pain, nausea, vomiting  GENITOURINARY: No dysuria, frequency or hematuria  NEUROLOGICAL: No numbness or weakness  SKIN: No itching, burning, rashes, or lesions   All other 10 review of systems is negative unless indicated above.    Vital Signs Last 24 Hrs  T(C): 37.8 (01 Jun 2019 08:40), Max: 37.8 (01 Jun 2019 08:40)  T(F): 100 (01 Jun 2019 08:40), Max: 100 (01 Jun 2019 08:40)  HR: 101 (01 Jun 2019 08:40) (90 - 117)  BP: 147/89 (01 Jun 2019 08:40) (105/64 - 147/89)  BP(mean): --  RR: 17 (01 Jun 2019 08:40) (16 - 18)  SpO2: 99% (01 Jun 2019 08:40) (97% - 99%)      PHYSICAL EXAM:    General: Well developed; well nourished; in no acute distress  Eyes: Anicteric sclerae, moist conjunctivae  HENT: Moist mucous membranes  Neck: Trachea midline, supple  Lungs: Normal respiratory effors and no intercostal retractions  Cardiovascular: RRR  Abdomen: Soft, mild epigastric tenderness, non-distended; Normal bowel sounds; No rebound or guarding; no peritoneal signs  Extremities: Normal range of motion, No clubbing, cyanosis or edema  Neurological: Alert and oriented x3  Skin: Warm and dry. No obvious rash    LABS:                        14.0   15.97 )-----------( 116      ( 01 Jun 2019 07:57 )             40.2     06-01    130<L>  |  92<L>  |  12  ----------------------------<  131<H>  3.2<L>   |  24  |  1.10    Ca    8.5      01 Jun 2019 07:57    TPro  7.3  /  Alb  3.7  /  TBili  2.1<H>  /  DBili  x   /  AST  31  /  ALT  27  /  AlkPhos  102  06-01            RADIOLOGY & ADDITIONAL STUDIES:

## 2019-06-02 LAB
ALBUMIN SERPL ELPH-MCNC: 3.4 G/DL — SIGNIFICANT CHANGE UP (ref 3.3–5)
ALP SERPL-CCNC: 114 U/L — SIGNIFICANT CHANGE UP (ref 40–120)
ALT FLD-CCNC: 21 U/L — SIGNIFICANT CHANGE UP (ref 10–45)
ANION GAP SERPL CALC-SCNC: 10 MMOL/L — SIGNIFICANT CHANGE UP (ref 5–17)
AST SERPL-CCNC: 30 U/L — SIGNIFICANT CHANGE UP (ref 10–40)
BILIRUB DIRECT SERPL-MCNC: 0.4 MG/DL — HIGH (ref 0–0.2)
BILIRUB SERPL-MCNC: 1.5 MG/DL — HIGH (ref 0.2–1.2)
BUN SERPL-MCNC: 8 MG/DL — SIGNIFICANT CHANGE UP (ref 7–23)
CALCIUM SERPL-MCNC: 9 MG/DL — SIGNIFICANT CHANGE UP (ref 8.4–10.5)
CHLORIDE SERPL-SCNC: 100 MMOL/L — SIGNIFICANT CHANGE UP (ref 96–108)
CO2 SERPL-SCNC: 25 MMOL/L — SIGNIFICANT CHANGE UP (ref 22–31)
CREAT SERPL-MCNC: 0.7 MG/DL — SIGNIFICANT CHANGE UP (ref 0.5–1.3)
CULTURE RESULTS: NO GROWTH — SIGNIFICANT CHANGE UP
GLUCOSE SERPL-MCNC: 105 MG/DL — HIGH (ref 70–99)
HCT VFR BLD CALC: 35.7 % — LOW (ref 39–50)
HCV AB S/CO SERPL IA: 0.12 S/CO — SIGNIFICANT CHANGE UP
HCV AB SERPL-IMP: SIGNIFICANT CHANGE UP
HGB BLD-MCNC: 11.9 G/DL — LOW (ref 13–17)
MAGNESIUM SERPL-MCNC: 2 MG/DL — SIGNIFICANT CHANGE UP (ref 1.6–2.6)
MCHC RBC-ENTMCNC: 32.3 PG — SIGNIFICANT CHANGE UP (ref 27–34)
MCHC RBC-ENTMCNC: 33.3 GM/DL — SIGNIFICANT CHANGE UP (ref 32–36)
MCV RBC AUTO: 97 FL — SIGNIFICANT CHANGE UP (ref 80–100)
NRBC # BLD: 0 /100 WBCS — SIGNIFICANT CHANGE UP (ref 0–0)
PHOSPHATE SERPL-MCNC: 2.8 MG/DL — SIGNIFICANT CHANGE UP (ref 2.5–4.5)
PLATELET # BLD AUTO: 106 K/UL — LOW (ref 150–400)
POTASSIUM SERPL-MCNC: 3.8 MMOL/L — SIGNIFICANT CHANGE UP (ref 3.5–5.3)
POTASSIUM SERPL-SCNC: 3.8 MMOL/L — SIGNIFICANT CHANGE UP (ref 3.5–5.3)
PROT SERPL-MCNC: 6.8 G/DL — SIGNIFICANT CHANGE UP (ref 6–8.3)
RBC # BLD: 3.68 M/UL — LOW (ref 4.2–5.8)
RBC # FLD: 12.7 % — SIGNIFICANT CHANGE UP (ref 10.3–14.5)
SODIUM SERPL-SCNC: 135 MMOL/L — SIGNIFICANT CHANGE UP (ref 135–145)
SPECIMEN SOURCE: SIGNIFICANT CHANGE UP
WBC # BLD: 11.86 K/UL — HIGH (ref 3.8–10.5)
WBC # FLD AUTO: 11.86 K/UL — HIGH (ref 3.8–10.5)

## 2019-06-02 PROCEDURE — 74181 MRI ABDOMEN W/O CONTRAST: CPT | Mod: 26

## 2019-06-02 RX ORDER — LABETALOL HCL 100 MG
10 TABLET ORAL ONCE
Refills: 0 | Status: COMPLETED | OUTPATIENT
Start: 2019-06-02 | End: 2019-06-02

## 2019-06-02 RX ORDER — POTASSIUM PHOSPHATE, MONOBASIC POTASSIUM PHOSPHATE, DIBASIC 236; 224 MG/ML; MG/ML
15 INJECTION, SOLUTION INTRAVENOUS ONCE
Refills: 0 | Status: COMPLETED | OUTPATIENT
Start: 2019-06-02 | End: 2019-06-02

## 2019-06-02 RX ADMIN — HYDROMORPHONE HYDROCHLORIDE 0.5 MILLIGRAM(S): 2 INJECTION INTRAMUSCULAR; INTRAVENOUS; SUBCUTANEOUS at 00:38

## 2019-06-02 RX ADMIN — PIPERACILLIN AND TAZOBACTAM 200 GRAM(S): 4; .5 INJECTION, POWDER, LYOPHILIZED, FOR SOLUTION INTRAVENOUS at 11:15

## 2019-06-02 RX ADMIN — HEPARIN SODIUM 5000 UNIT(S): 5000 INJECTION INTRAVENOUS; SUBCUTANEOUS at 22:14

## 2019-06-02 RX ADMIN — HEPARIN SODIUM 5000 UNIT(S): 5000 INJECTION INTRAVENOUS; SUBCUTANEOUS at 05:38

## 2019-06-02 RX ADMIN — Medication 10 MILLIGRAM(S): at 23:52

## 2019-06-02 RX ADMIN — HYDROMORPHONE HYDROCHLORIDE 1 MILLIGRAM(S): 2 INJECTION INTRAMUSCULAR; INTRAVENOUS; SUBCUTANEOUS at 07:06

## 2019-06-02 RX ADMIN — PIPERACILLIN AND TAZOBACTAM 200 GRAM(S): 4; .5 INJECTION, POWDER, LYOPHILIZED, FOR SOLUTION INTRAVENOUS at 05:38

## 2019-06-02 RX ADMIN — HEPARIN SODIUM 5000 UNIT(S): 5000 INJECTION INTRAVENOUS; SUBCUTANEOUS at 13:19

## 2019-06-02 RX ADMIN — HYDROMORPHONE HYDROCHLORIDE 1 MILLIGRAM(S): 2 INJECTION INTRAMUSCULAR; INTRAVENOUS; SUBCUTANEOUS at 17:19

## 2019-06-02 RX ADMIN — PIPERACILLIN AND TAZOBACTAM 200 GRAM(S): 4; .5 INJECTION, POWDER, LYOPHILIZED, FOR SOLUTION INTRAVENOUS at 17:17

## 2019-06-02 RX ADMIN — HYDROMORPHONE HYDROCHLORIDE 1 MILLIGRAM(S): 2 INJECTION INTRAMUSCULAR; INTRAVENOUS; SUBCUTANEOUS at 12:15

## 2019-06-02 RX ADMIN — HYDROMORPHONE HYDROCHLORIDE 1 MILLIGRAM(S): 2 INJECTION INTRAMUSCULAR; INTRAVENOUS; SUBCUTANEOUS at 07:36

## 2019-06-02 RX ADMIN — PIPERACILLIN AND TAZOBACTAM 200 GRAM(S): 4; .5 INJECTION, POWDER, LYOPHILIZED, FOR SOLUTION INTRAVENOUS at 00:38

## 2019-06-02 RX ADMIN — Medication 10 MILLIGRAM(S): at 20:30

## 2019-06-02 RX ADMIN — HYDROMORPHONE HYDROCHLORIDE 1 MILLIGRAM(S): 2 INJECTION INTRAMUSCULAR; INTRAVENOUS; SUBCUTANEOUS at 11:15

## 2019-06-02 RX ADMIN — HYDROMORPHONE HYDROCHLORIDE 0.5 MILLIGRAM(S): 2 INJECTION INTRAMUSCULAR; INTRAVENOUS; SUBCUTANEOUS at 01:15

## 2019-06-02 RX ADMIN — Medication 1.25 MILLIGRAM(S): at 17:17

## 2019-06-02 RX ADMIN — HYDROMORPHONE HYDROCHLORIDE 1 MILLIGRAM(S): 2 INJECTION INTRAMUSCULAR; INTRAVENOUS; SUBCUTANEOUS at 02:59

## 2019-06-02 RX ADMIN — Medication 1 MILLIGRAM(S): at 18:07

## 2019-06-02 RX ADMIN — Medication 1 PATCH: at 01:16

## 2019-06-02 RX ADMIN — HYDROMORPHONE HYDROCHLORIDE 1 MILLIGRAM(S): 2 INJECTION INTRAMUSCULAR; INTRAVENOUS; SUBCUTANEOUS at 03:30

## 2019-06-02 RX ADMIN — HYDROMORPHONE HYDROCHLORIDE 1 MILLIGRAM(S): 2 INJECTION INTRAMUSCULAR; INTRAVENOUS; SUBCUTANEOUS at 17:00

## 2019-06-02 RX ADMIN — POTASSIUM PHOSPHATE, MONOBASIC POTASSIUM PHOSPHATE, DIBASIC 63.75 MILLIMOLE(S): 236; 224 INJECTION, SOLUTION INTRAVENOUS at 13:19

## 2019-06-02 RX ADMIN — HYDROMORPHONE HYDROCHLORIDE 0.5 MILLIGRAM(S): 2 INJECTION INTRAMUSCULAR; INTRAVENOUS; SUBCUTANEOUS at 21:18

## 2019-06-02 RX ADMIN — PIPERACILLIN AND TAZOBACTAM 200 GRAM(S): 4; .5 INJECTION, POWDER, LYOPHILIZED, FOR SOLUTION INTRAVENOUS at 23:52

## 2019-06-02 NOTE — PROGRESS NOTE ADULT - SUBJECTIVE AND OBJECTIVE BOX
INTERVAL HPI/OVERNIGHT EVENTS:   SURGERY ATTENDING        SUBJECTIVE:  Flatus: [ ] YES [x ] NO             Bowel Movement: [ ] YES [x ] NO  Pain (0-10):         3   Pain Control Adequate: [x ] YES [ ] NO  Nausea: [x ] YES [ ] NO            Vomiting: [ ] YES [x ] NO  Diarrhea: [ ] YES [x ] NO         Constipation: [ ] YES [x ] NO     Chest Pain: [ ] YES [x ] NO    SOB:  [ ] YES [x ] NO    MEDICATIONS  (STANDING):  heparin  Injectable 5000 Unit(s) SubCutaneous every 8 hours  nicotine -   7 mG/24Hr(s) Patch 1 patch Transdermal daily  piperacillin/tazobactam IVPB. 3.375 Gram(s) IV Intermittent every 6 hours  sodium chloride 0.9%. 1000 milliLiter(s) (200 mL/Hr) IV Continuous <Continuous>    MEDICATIONS  (PRN):  HYDROmorphone  Injectable 0.5 milliGRAM(s) IV Push every 4 hours PRN Moderate Pain (4 - 6)  HYDROmorphone  Injectable 1 milliGRAM(s) IV Push every 4 hours PRN Severe Pain (7 - 10)  ondansetron Injectable 4 milliGRAM(s) IV Push every 6 hours PRN Nausea      Vital Signs Last 24 Hrs  T(C): 36.9 (02 Jun 2019 09:31), Max: 37.3 (02 Jun 2019 06:29)  T(F): 98.4 (02 Jun 2019 09:31), Max: 99.1 (02 Jun 2019 06:29)  HR: 92 (02 Jun 2019 11:23) (86 - 101)  BP: 146/94 (02 Jun 2019 11:23) (120/76 - 155/95)  BP(mean): 115 (02 Jun 2019 11:23) (105 - 115)  RR: 18 (02 Jun 2019 11:23) (16 - 18)  SpO2: 96% (02 Jun 2019 11:23) (94% - 99%)    PHYSICAL EXAM:      Constitutional:    Eyes:    ENMT:    Neck:    Breasts:    Back:    Respiratory:    Cardiovascular:    Gastrointestinal:    Genitourinary:    Rectal:    Extremities:    Vascular:    Neurological:    Skin:    Lymph Nodes:    Musculoskeletal:    Psychiatric:        I&O's Detail    01 Jun 2019 07:01  -  02 Jun 2019 07:00  --------------------------------------------------------  IN:    IV PiggyBack: 200 mL    sodium chloride 0.9%.: 2600 mL    Solution: 125 mL  Total IN: 2925 mL    OUT:    Voided: 1650 mL  Total OUT: 1650 mL    Total NET: 1275 mL      02 Jun 2019 07:01  -  02 Jun 2019 14:04  --------------------------------------------------------  IN:    IV PiggyBack: 100 mL    sodium chloride 0.9%.: 800 mL    Solution: 125 mL  Total IN: 1025 mL    OUT:    Voided: 500 mL  Total OUT: 500 mL    Total NET: 525 mL          LABS:                        11.9   11.86 )-----------( 106      ( 02 Jun 2019 06:03 )             35.7     06-02    135  |  100  |  8   ----------------------------<  105<H>  3.8   |  25  |  0.70    Ca    9.0      02 Jun 2019 06:03  Phos  2.8     06-02  Mg     2.0     06-02    TPro  6.8  /  Alb  3.4  /  TBili  1.5<H>  /  DBili  0.4<H>  /  AST  30  /  ALT  21  /  AlkPhos  114  06-02      Urinalysis Basic - ( 01 Jun 2019 08:03 )    Color: Yellow / Appearance: Clear / SG: <=1.005 / pH: x  Gluc: x / Ketone: NEGATIVE  / Bili: Negative / Urobili: 0.2 E.U./dL   Blood: x / Protein: NEGATIVE mg/dL / Nitrite: NEGATIVE   Leuk Esterase: NEGATIVE / RBC: x / WBC x   Sq Epi: x / Non Sq Epi: x / Bacteria: x        RADIOLOGY & ADDITIONAL STUDIES:

## 2019-06-02 NOTE — PROGRESS NOTE ADULT - SUBJECTIVE AND OBJECTIVE BOX
SUBJECTIVE: Pt seen and examined at bedside with chief. Pt denies any complaints. Pain well controlled. Denies N/V,CP,SOB    MEDICATIONS  (STANDING):  heparin  Injectable 5000 Unit(s) SubCutaneous every 8 hours  nicotine -   7 mG/24Hr(s) Patch 1 patch Transdermal daily  piperacillin/tazobactam IVPB. 3.375 Gram(s) IV Intermittent every 6 hours  sodium chloride 0.9%. 1000 milliLiter(s) (200 mL/Hr) IV Continuous <Continuous>    MEDICATIONS  (PRN):  HYDROmorphone  Injectable 0.5 milliGRAM(s) IV Push every 4 hours PRN Moderate Pain (4 - 6)  HYDROmorphone  Injectable 1 milliGRAM(s) IV Push every 4 hours PRN Severe Pain (7 - 10)  ondansetron Injectable 4 milliGRAM(s) IV Push every 6 hours PRN Nausea      Vital Signs Last 24 Hrs  T(C): 37.3 (02 Jun 2019 06:29), Max: 37.8 (01 Jun 2019 08:40)  T(F): 99.1 (02 Jun 2019 06:29), Max: 100 (01 Jun 2019 08:40)  HR: 86 (02 Jun 2019 04:43) (86 - 101)  BP: 136/87 (02 Jun 2019 04:43) (120/76 - 155/95)  BP(mean): 106 (02 Jun 2019 04:43) (105 - 112)  RR: 16 (02 Jun 2019 04:43) (16 - 18)  SpO2: 94% (02 Jun 2019 04:43) (94% - 99%)    PHYSICAL EXAM:      Constitutional: A&Ox3    Respiratory: non labored breathing, no respiratory distress    Cardiovascular: NSR, RRR    Gastrointestinal: Soft ND, NT, no rebound or guarding    Genitourinary: voiding     Extremities: (-) edema                  I&O's Detail    01 Jun 2019 07:01  -  02 Jun 2019 07:00  --------------------------------------------------------  IN:    IV PiggyBack: 200 mL    sodium chloride 0.9%.: 2600 mL    Solution: 125 mL  Total IN: 2925 mL    OUT:    Voided: 1650 mL  Total OUT: 1650 mL    Total NET: 1275 mL          LABS:                        11.9   11.86 )-----------( 106      ( 02 Jun 2019 06:03 )             35.7     06-02    135  |  100  |  8   ----------------------------<  105<H>  3.8   |  25  |  0.70    Ca    9.0      02 Jun 2019 06:03  Phos  2.8     06-02  Mg     2.0     06-02    TPro  6.8  /  Alb  3.4  /  TBili  1.5<H>  /  DBili  x   /  AST  30  /  ALT  21  /  AlkPhos  114  06-02      Urinalysis Basic - ( 01 Jun 2019 08:03 )    Color: Yellow / Appearance: Clear / SG: <=1.005 / pH: x  Gluc: x / Ketone: NEGATIVE  / Bili: Negative / Urobili: 0.2 E.U./dL   Blood: x / Protein: NEGATIVE mg/dL / Nitrite: NEGATIVE   Leuk Esterase: NEGATIVE / RBC: x / WBC x   Sq Epi: x / Non Sq Epi: x / Bacteria: x        RADIOLOGY & ADDITIONAL STUDIES:

## 2019-06-02 NOTE — PROGRESS NOTE ADULT - ASSESSMENT
52 yo M active smoker and alcoholic, PMHx of HLD, HTN, CAD s/p angioplasty, Hx of splenic hemorrhage and subcapsular collection (resolved), now p/w acute pancreatitis with pancreatic tail collection. +leukocytosis and +enterorhino virus positive. T.Bili of 2.1 (baseline 0.7). To rule out necrotizing pancreatitis vs pseudocyst vs gall stone pancreatitis.     Pain and nausea control  IVF and NPO  GI consult ([facility]) TRO gall stone pancreatitis   MRCP  IV.Zosyn   DVT prophylaxis  AM labs with CMP  OOB/IS

## 2019-06-02 NOTE — PROGRESS NOTE ADULT - SUBJECTIVE AND OBJECTIVE BOX
Pt seen and examined at bedside.    PERTINENT REVIEW OF SYSTEMS:  CONSTITUTIONAL: No weakness, fevers or chills  HEENT: No visual changes; No vertigo or throat pain   GASTROINTESTINAL: No abdominal or epigastric pain. No nausea, vomiting, or hematemesis; No diarrhea or constipation. No melena or hematochezia.  NEUROLOGICAL: No numbness or weakness  SKIN: No itching, burning, rashes, or lesions     Allergies    No Known Allergies    Intolerances      MEDICATIONS:  MEDICATIONS  (STANDING):  heparin  Injectable 5000 Unit(s) SubCutaneous every 8 hours  nicotine -   7 mG/24Hr(s) Patch 1 patch Transdermal daily  piperacillin/tazobactam IVPB. 3.375 Gram(s) IV Intermittent every 6 hours  sodium chloride 0.9%. 1000 milliLiter(s) (200 mL/Hr) IV Continuous <Continuous>    MEDICATIONS  (PRN):  HYDROmorphone  Injectable 0.5 milliGRAM(s) IV Push every 4 hours PRN Moderate Pain (4 - 6)  HYDROmorphone  Injectable 1 milliGRAM(s) IV Push every 4 hours PRN Severe Pain (7 - 10)  ondansetron Injectable 4 milliGRAM(s) IV Push every 6 hours PRN Nausea    Vital Signs Last 24 Hrs  T(C): 37.3 (02 Jun 2019 06:29), Max: 37.3 (02 Jun 2019 06:29)  T(F): 99.1 (02 Jun 2019 06:29), Max: 99.1 (02 Jun 2019 06:29)  HR: 96 (02 Jun 2019 08:22) (86 - 101)  BP: 142/95 (02 Jun 2019 08:22) (120/76 - 155/95)  BP(mean): 113 (02 Jun 2019 08:22) (105 - 113)  RR: 17 (02 Jun 2019 08:22) (16 - 18)  SpO2: 95% (02 Jun 2019 08:22) (94% - 99%)    06-01 @ 07:01  -  06-02 @ 07:00  --------------------------------------------------------  IN: 2925 mL / OUT: 1650 mL / NET: 1275 mL      PHYSICAL EXAM:    General: Well developed; well nourished; in no acute distress  HEENT: MMM, conjunctiva and sclera clear  Gastrointestinal: Soft non-tender non-distended; Normal bowel sounds; No hepatosplenomegaly. No rebound or guarding  Skin: Warm and dry. No obvious rash    LABS:                        11.9   11.86 )-----------( 106      ( 02 Jun 2019 06:03 )             35.7     06-02    135  |  100  |  8   ----------------------------<  105<H>  3.8   |  25  |  0.70    Ca    9.0      02 Jun 2019 06:03  Phos  2.8     06-02  Mg     2.0     06-02    TPro  6.8  /  Alb  3.4  /  TBili  1.5<H>  /  DBili  x   /  AST  30  /  ALT  21  /  AlkPhos  114  06-02          Urinalysis Basic - ( 01 Jun 2019 08:03 )    Color: Yellow / Appearance: Clear / SG: <=1.005 / pH: x  Gluc: x / Ketone: NEGATIVE  / Bili: Negative / Urobili: 0.2 E.U./dL   Blood: x / Protein: NEGATIVE mg/dL / Nitrite: NEGATIVE   Leuk Esterase: NEGATIVE / RBC: x / WBC x   Sq Epi: x / Non Sq Epi: x / Bacteria: x                Culture - Urine (collected 01 Jun 2019 09:24)  Source: .Urine Clean Catch (Midstream)  Final Report (02 Jun 2019 08:09):    No growth    Culture - Blood (collected 01 Jun 2019 08:34)  Source: .Blood Blood-Venous  Preliminary Report (01 Jun 2019 21:00):    No growth at 12 hours    Culture - Blood (collected 01 Jun 2019 08:34)  Source: .Blood Blood-Venous  Preliminary Report (01 Jun 2019 21:00):    No growth at 12 hours      RADIOLOGY & ADDITIONAL STUDIES: Pt seen and examined at bedside. Pt reports that pain has resolved. Passing flatus. No BM today    PERTINENT REVIEW OF SYSTEMS:  CONSTITUTIONAL: No weakness, fevers or chills  HEENT: No visual changes; No vertigo or throat pain   GASTROINTESTINAL: No abdominal or epigastric pain. No nausea, vomiting, or hematemesis; No diarrhea or constipation. No melena or hematochezia.  NEUROLOGICAL: No numbness or weakness  SKIN: No itching, burning, rashes, or lesions     Allergies    No Known Allergies    Intolerances      MEDICATIONS:  MEDICATIONS  (STANDING):  heparin  Injectable 5000 Unit(s) SubCutaneous every 8 hours  nicotine -   7 mG/24Hr(s) Patch 1 patch Transdermal daily  piperacillin/tazobactam IVPB. 3.375 Gram(s) IV Intermittent every 6 hours  sodium chloride 0.9%. 1000 milliLiter(s) (200 mL/Hr) IV Continuous <Continuous>    MEDICATIONS  (PRN):  HYDROmorphone  Injectable 0.5 milliGRAM(s) IV Push every 4 hours PRN Moderate Pain (4 - 6)  HYDROmorphone  Injectable 1 milliGRAM(s) IV Push every 4 hours PRN Severe Pain (7 - 10)  ondansetron Injectable 4 milliGRAM(s) IV Push every 6 hours PRN Nausea    Vital Signs Last 24 Hrs  T(C): 37.3 (02 Jun 2019 06:29), Max: 37.3 (02 Jun 2019 06:29)  T(F): 99.1 (02 Jun 2019 06:29), Max: 99.1 (02 Jun 2019 06:29)  HR: 96 (02 Jun 2019 08:22) (86 - 101)  BP: 142/95 (02 Jun 2019 08:22) (120/76 - 155/95)  BP(mean): 113 (02 Jun 2019 08:22) (105 - 113)  RR: 17 (02 Jun 2019 08:22) (16 - 18)  SpO2: 95% (02 Jun 2019 08:22) (94% - 99%)    06-01 @ 07:01  -  06-02 @ 07:00  --------------------------------------------------------  IN: 2925 mL / OUT: 1650 mL / NET: 1275 mL      PHYSICAL EXAM:    General: Well developed; well nourished; in no acute distress  HEENT: MMM, conjunctiva and sclera clear  Gastrointestinal: Soft non-tender non-distended; Normal bowel sounds; No hepatosplenomegaly. No rebound or guarding  Skin: Warm and dry. No obvious rash    LABS:                        11.9   11.86 )-----------( 106      ( 02 Jun 2019 06:03 )             35.7     06-02    135  |  100  |  8   ----------------------------<  105<H>  3.8   |  25  |  0.70    Ca    9.0      02 Jun 2019 06:03  Phos  2.8     06-02  Mg     2.0     06-02    TPro  6.8  /  Alb  3.4  /  TBili  1.5<H>  /  DBili  x   /  AST  30  /  ALT  21  /  AlkPhos  114  06-02          Urinalysis Basic - ( 01 Jun 2019 08:03 )    Color: Yellow / Appearance: Clear / SG: <=1.005 / pH: x  Gluc: x / Ketone: NEGATIVE  / Bili: Negative / Urobili: 0.2 E.U./dL   Blood: x / Protein: NEGATIVE mg/dL / Nitrite: NEGATIVE   Leuk Esterase: NEGATIVE / RBC: x / WBC x   Sq Epi: x / Non Sq Epi: x / Bacteria: x                Culture - Urine (collected 01 Jun 2019 09:24)  Source: .Urine Clean Catch (Midstream)  Final Report (02 Jun 2019 08:09):    No growth    Culture - Blood (collected 01 Jun 2019 08:34)  Source: .Blood Blood-Venous  Preliminary Report (01 Jun 2019 21:00):    No growth at 12 hours    Culture - Blood (collected 01 Jun 2019 08:34)  Source: .Blood Blood-Venous  Preliminary Report (01 Jun 2019 21:00):    No growth at 12 hours      RADIOLOGY & ADDITIONAL STUDIES:

## 2019-06-03 LAB
ALBUMIN SERPL ELPH-MCNC: 3.3 G/DL — SIGNIFICANT CHANGE UP (ref 3.3–5)
ALP SERPL-CCNC: 118 U/L — SIGNIFICANT CHANGE UP (ref 40–120)
ALT FLD-CCNC: 22 U/L — SIGNIFICANT CHANGE UP (ref 10–45)
ANION GAP SERPL CALC-SCNC: 13 MMOL/L — SIGNIFICANT CHANGE UP (ref 5–17)
AST SERPL-CCNC: 31 U/L — SIGNIFICANT CHANGE UP (ref 10–40)
BILIRUB SERPL-MCNC: 1.7 MG/DL — HIGH (ref 0.2–1.2)
BUN SERPL-MCNC: 6 MG/DL — LOW (ref 7–23)
CALCIUM SERPL-MCNC: 9.3 MG/DL — SIGNIFICANT CHANGE UP (ref 8.4–10.5)
CHLORIDE SERPL-SCNC: 98 MMOL/L — SIGNIFICANT CHANGE UP (ref 96–108)
CO2 SERPL-SCNC: 24 MMOL/L — SIGNIFICANT CHANGE UP (ref 22–31)
CREAT SERPL-MCNC: 0.62 MG/DL — SIGNIFICANT CHANGE UP (ref 0.5–1.3)
GLUCOSE SERPL-MCNC: 80 MG/DL — SIGNIFICANT CHANGE UP (ref 70–99)
HCT VFR BLD CALC: 35 % — LOW (ref 39–50)
HGB BLD-MCNC: 11.6 G/DL — LOW (ref 13–17)
MAGNESIUM SERPL-MCNC: 1.6 MG/DL — SIGNIFICANT CHANGE UP (ref 1.6–2.6)
MCHC RBC-ENTMCNC: 32.1 PG — SIGNIFICANT CHANGE UP (ref 27–34)
MCHC RBC-ENTMCNC: 33.1 GM/DL — SIGNIFICANT CHANGE UP (ref 32–36)
MCV RBC AUTO: 97 FL — SIGNIFICANT CHANGE UP (ref 80–100)
NRBC # BLD: 0 /100 WBCS — SIGNIFICANT CHANGE UP (ref 0–0)
PHOSPHATE SERPL-MCNC: 3.6 MG/DL — SIGNIFICANT CHANGE UP (ref 2.5–4.5)
PLATELET # BLD AUTO: 128 K/UL — LOW (ref 150–400)
POTASSIUM SERPL-MCNC: 3.5 MMOL/L — SIGNIFICANT CHANGE UP (ref 3.5–5.3)
POTASSIUM SERPL-SCNC: 3.5 MMOL/L — SIGNIFICANT CHANGE UP (ref 3.5–5.3)
PROT SERPL-MCNC: 7.1 G/DL — SIGNIFICANT CHANGE UP (ref 6–8.3)
RBC # BLD: 3.61 M/UL — LOW (ref 4.2–5.8)
RBC # FLD: 12.5 % — SIGNIFICANT CHANGE UP (ref 10.3–14.5)
SODIUM SERPL-SCNC: 135 MMOL/L — SIGNIFICANT CHANGE UP (ref 135–145)
WBC # BLD: 10.57 K/UL — HIGH (ref 3.8–10.5)
WBC # FLD AUTO: 10.57 K/UL — HIGH (ref 3.8–10.5)

## 2019-06-03 PROCEDURE — 99233 SBSQ HOSP IP/OBS HIGH 50: CPT

## 2019-06-03 RX ORDER — NICOTINE POLACRILEX 2 MG
2 GUM BUCCAL EVERY 4 HOURS
Refills: 0 | Status: DISCONTINUED | OUTPATIENT
Start: 2019-06-03 | End: 2019-06-04

## 2019-06-03 RX ORDER — NICOTINE POLACRILEX 2 MG
2 GUM BUCCAL EVERY 4 HOURS
Refills: 0 | Status: DISCONTINUED | OUTPATIENT
Start: 2019-06-03 | End: 2019-06-03

## 2019-06-03 RX ORDER — DILTIAZEM HCL 120 MG
120 CAPSULE, EXT RELEASE 24 HR ORAL DAILY
Refills: 0 | Status: DISCONTINUED | OUTPATIENT
Start: 2019-06-03 | End: 2019-06-04

## 2019-06-03 RX ORDER — DILTIAZEM HCL 120 MG
120 CAPSULE, EXT RELEASE 24 HR ORAL DAILY
Refills: 0 | Status: DISCONTINUED | OUTPATIENT
Start: 2019-06-03 | End: 2019-06-03

## 2019-06-03 RX ORDER — POTASSIUM CHLORIDE 20 MEQ
40 PACKET (EA) ORAL ONCE
Refills: 0 | Status: COMPLETED | OUTPATIENT
Start: 2019-06-03 | End: 2019-06-03

## 2019-06-03 RX ORDER — SODIUM CHLORIDE 9 MG/ML
1000 INJECTION INTRAMUSCULAR; INTRAVENOUS; SUBCUTANEOUS
Refills: 0 | Status: DISCONTINUED | OUTPATIENT
Start: 2019-06-03 | End: 2019-06-04

## 2019-06-03 RX ORDER — MAGNESIUM SULFATE 500 MG/ML
2 VIAL (ML) INJECTION ONCE
Refills: 0 | Status: COMPLETED | OUTPATIENT
Start: 2019-06-03 | End: 2019-06-03

## 2019-06-03 RX ADMIN — HYDROMORPHONE HYDROCHLORIDE 1 MILLIGRAM(S): 2 INJECTION INTRAMUSCULAR; INTRAVENOUS; SUBCUTANEOUS at 06:05

## 2019-06-03 RX ADMIN — HEPARIN SODIUM 5000 UNIT(S): 5000 INJECTION INTRAVENOUS; SUBCUTANEOUS at 06:05

## 2019-06-03 RX ADMIN — HYDROMORPHONE HYDROCHLORIDE 1 MILLIGRAM(S): 2 INJECTION INTRAMUSCULAR; INTRAVENOUS; SUBCUTANEOUS at 01:15

## 2019-06-03 RX ADMIN — HEPARIN SODIUM 5000 UNIT(S): 5000 INJECTION INTRAVENOUS; SUBCUTANEOUS at 21:07

## 2019-06-03 RX ADMIN — Medication 1.25 MILLIGRAM(S): at 01:26

## 2019-06-03 RX ADMIN — Medication 50 GRAM(S): at 10:01

## 2019-06-03 RX ADMIN — PIPERACILLIN AND TAZOBACTAM 200 GRAM(S): 4; .5 INJECTION, POWDER, LYOPHILIZED, FOR SOLUTION INTRAVENOUS at 11:51

## 2019-06-03 RX ADMIN — HYDROMORPHONE HYDROCHLORIDE 1 MILLIGRAM(S): 2 INJECTION INTRAMUSCULAR; INTRAVENOUS; SUBCUTANEOUS at 11:51

## 2019-06-03 RX ADMIN — Medication 1.25 MILLIGRAM(S): at 08:29

## 2019-06-03 RX ADMIN — PIPERACILLIN AND TAZOBACTAM 200 GRAM(S): 4; .5 INJECTION, POWDER, LYOPHILIZED, FOR SOLUTION INTRAVENOUS at 17:25

## 2019-06-03 RX ADMIN — HYDROMORPHONE HYDROCHLORIDE 1 MILLIGRAM(S): 2 INJECTION INTRAMUSCULAR; INTRAVENOUS; SUBCUTANEOUS at 12:18

## 2019-06-03 RX ADMIN — Medication 40 MILLIEQUIVALENT(S): at 10:01

## 2019-06-03 RX ADMIN — HYDROMORPHONE HYDROCHLORIDE 1 MILLIGRAM(S): 2 INJECTION INTRAMUSCULAR; INTRAVENOUS; SUBCUTANEOUS at 17:31

## 2019-06-03 RX ADMIN — HEPARIN SODIUM 5000 UNIT(S): 5000 INJECTION INTRAVENOUS; SUBCUTANEOUS at 13:12

## 2019-06-03 RX ADMIN — HYDROMORPHONE HYDROCHLORIDE 1 MILLIGRAM(S): 2 INJECTION INTRAMUSCULAR; INTRAVENOUS; SUBCUTANEOUS at 21:06

## 2019-06-03 RX ADMIN — PIPERACILLIN AND TAZOBACTAM 200 GRAM(S): 4; .5 INJECTION, POWDER, LYOPHILIZED, FOR SOLUTION INTRAVENOUS at 23:15

## 2019-06-03 RX ADMIN — Medication 2 MILLIGRAM(S): at 05:16

## 2019-06-03 RX ADMIN — HYDROMORPHONE HYDROCHLORIDE 1 MILLIGRAM(S): 2 INJECTION INTRAMUSCULAR; INTRAVENOUS; SUBCUTANEOUS at 17:01

## 2019-06-03 RX ADMIN — HYDROMORPHONE HYDROCHLORIDE 1 MILLIGRAM(S): 2 INJECTION INTRAMUSCULAR; INTRAVENOUS; SUBCUTANEOUS at 21:45

## 2019-06-03 RX ADMIN — Medication 120 MILLIGRAM(S): at 11:51

## 2019-06-03 RX ADMIN — PIPERACILLIN AND TAZOBACTAM 200 GRAM(S): 4; .5 INJECTION, POWDER, LYOPHILIZED, FOR SOLUTION INTRAVENOUS at 06:05

## 2019-06-03 RX ADMIN — SODIUM CHLORIDE 120 MILLILITER(S): 9 INJECTION INTRAMUSCULAR; INTRAVENOUS; SUBCUTANEOUS at 22:30

## 2019-06-03 NOTE — PROGRESS NOTE ADULT - SUBJECTIVE AND OBJECTIVE BOX
SUBJECTIVE:   No acute events overnight.   Patient  pain controlled, out of bed ambulating, feeling better  Currently NPO . Denies nausea/vomiting  +Flatus    ---------------------------------------------------------------    Vital Signs Last 24 Hrs  T(C): 36.9 (03 Jun 2019 09:04), Max: 37.4 (03 Jun 2019 05:02)  T(F): 98.4 (03 Jun 2019 09:04), Max: 99.4 (03 Jun 2019 05:02)  HR: 82 (03 Jun 2019 08:10) (82 - 94)  BP: 176/104 (03 Jun 2019 08:10) (146/94 - 179/103)  BP(mean): 132 (03 Jun 2019 08:10) (115 - 137)  RR: 17 (03 Jun 2019 08:10) (17 - 19)  SpO2: 96% (03 Jun 2019 08:10) (96% - 98%)    I&O's Detail    02 Jun 2019 07:01  -  03 Jun 2019 07:00  --------------------------------------------------------  IN:    IV PiggyBack: 100 mL    sodium chloride 0.9%.: 2150 mL    Solution: 350 mL  Total IN: 2600 mL    OUT:    Voided: 1950 mL  Total OUT: 1950 mL    Total NET: 650 mL          ----------------------------------------------------------------    Physical Exam:  General: NAD, Comfortable in bed     Neuro: A&Ox3  Respiratory: nonlabored breathing, no respiratory distress    Abd: soft, nontender, nondistended,  Extremities: WWP, nonedematous, SCDs in place          -------------------------------------------------------------------    LABS:                        11.6   10.57 )-----------( 128      ( 03 Jun 2019 06:27 )             35.0     06-03    135  |  98  |  6<L>  ----------------------------<  80  3.5   |  24  |  0.62    Ca    9.3      03 Jun 2019 06:27  Phos  3.6     06-03  Mg     1.6     06-03    TPro  7.1  /  Alb  3.3  /  TBili  1.7<H>  /  DBili  x   /  AST  31  /  ALT  22  /  AlkPhos  118  06-03            RADIOLOGY & ADDITIONAL STUDIES:

## 2019-06-03 NOTE — PROGRESS NOTE ADULT - ATTENDING COMMENTS
52 yo M with Chronic / Acute ETOH induced pancreatitis, non compliant patient, Smoker, ETOH abuse.  Needs MRCP,  GI consult, Quit ETOH abuse and Smoking.  NPO, IVF, F/U LABS VS, GI consult, Psychiatry consult.
Pt was seen and examined. Agree with the above. Repeat CT scan in 6 weeks

## 2019-06-03 NOTE — PROGRESS NOTE ADULT - SUBJECTIVE AND OBJECTIVE BOX
Pt seen and examined at bedside.    PERTINENT REVIEW OF SYSTEMS:  CONSTITUTIONAL: No weakness, fevers or chills  HEENT: No visual changes; No vertigo or throat pain   GASTROINTESTINAL: No abdominal or epigastric pain. No nausea, vomiting, or hematemesis; No diarrhea or constipation. No melena or hematochezia.  NEUROLOGICAL: No numbness or weakness  SKIN: No itching, burning, rashes, or lesions     Allergies    No Known Allergies    Intolerances      MEDICATIONS:  MEDICATIONS  (STANDING):  diltiazem    Tablet 120 milliGRAM(s) Oral daily  heparin  Injectable 5000 Unit(s) SubCutaneous every 8 hours  piperacillin/tazobactam IVPB. 3.375 Gram(s) IV Intermittent every 6 hours  sodium chloride 0.9%. 1000 milliLiter(s) (200 mL/Hr) IV Continuous <Continuous>    MEDICATIONS  (PRN):  HYDROmorphone  Injectable 0.5 milliGRAM(s) IV Push every 4 hours PRN Moderate Pain (4 - 6)  HYDROmorphone  Injectable 1 milliGRAM(s) IV Push every 4 hours PRN Severe Pain (7 - 10)  nicotine  Polacrilex Gum 2 milliGRAM(s) Oral every 4 hours PRN nicotine craving  ondansetron Injectable 4 milliGRAM(s) IV Push every 6 hours PRN Nausea    Vital Signs Last 24 Hrs  T(C): 36.9 (03 Jun 2019 09:04), Max: 37.4 (03 Jun 2019 05:02)  T(F): 98.4 (03 Jun 2019 09:04), Max: 99.4 (03 Jun 2019 05:02)  HR: 99 (03 Jun 2019 09:24) (82 - 99)  BP: 164/87 (03 Jun 2019 09:24) (146/94 - 179/103)  BP(mean): 119 (03 Jun 2019 09:24) (115 - 137)  RR: 19 (03 Jun 2019 09:24) (17 - 19)  SpO2: 99% (03 Jun 2019 09:24) (96% - 99%)    06-02 @ 07:01  -  06-03 @ 07:00  --------------------------------------------------------  IN: 2600 mL / OUT: 1950 mL / NET: 650 mL    06-03 @ 07:01  -  06-03 @ 10:52  --------------------------------------------------------  IN: 400 mL / OUT: 0 mL / NET: 400 mL      PHYSICAL EXAM:    General: Well developed; well nourished; in no acute distress  HEENT: MMM, conjunctiva and sclera clear  Gastrointestinal: Soft non-tender non-distended; Normal bowel sounds; No hepatosplenomegaly. No rebound or guarding  Skin: Warm and dry. No obvious rash    LABS:                        11.6   10.57 )-----------( 128      ( 03 Jun 2019 06:27 )             35.0     06-03    135  |  98  |  6<L>  ----------------------------<  80  3.5   |  24  |  0.62    Ca    9.3      03 Jun 2019 06:27  Phos  3.6     06-03  Mg     1.6     06-03    TPro  7.1  /  Alb  3.3  /  TBili  1.7<H>  /  DBili  x   /  AST  31  /  ALT  22  /  AlkPhos  118  06-03                      Culture - Urine (collected 01 Jun 2019 09:24)  Source: .Urine Clean Catch (Midstream)  Final Report (02 Jun 2019 08:09):    No growth    Culture - Blood (collected 01 Jun 2019 08:34)  Source: .Blood Blood-Venous  Preliminary Report (03 Jun 2019 09:00):    No growth at 2 days.    Culture - Blood (collected 01 Jun 2019 08:34)  Source: .Blood Blood-Venous  Preliminary Report (03 Jun 2019 09:00):    No growth at 2 days.      RADIOLOGY & ADDITIONAL STUDIES: Pt seen and examined at bedside. Pt has no abd pain, nausea, vomiting. He is tolerating diet    PERTINENT REVIEW OF SYSTEMS:  CONSTITUTIONAL: No weakness, fevers or chills  HEENT: No visual changes; No vertigo or throat pain   GASTROINTESTINAL: No abdominal or epigastric pain. No nausea, vomiting, or hematemesis; No diarrhea or constipation. No melena or hematochezia.  NEUROLOGICAL: No numbness or weakness  SKIN: No itching, burning, rashes, or lesions     Allergies    No Known Allergies    Intolerances      MEDICATIONS:  MEDICATIONS  (STANDING):  diltiazem    Tablet 120 milliGRAM(s) Oral daily  heparin  Injectable 5000 Unit(s) SubCutaneous every 8 hours  piperacillin/tazobactam IVPB. 3.375 Gram(s) IV Intermittent every 6 hours  sodium chloride 0.9%. 1000 milliLiter(s) (200 mL/Hr) IV Continuous <Continuous>    MEDICATIONS  (PRN):  HYDROmorphone  Injectable 0.5 milliGRAM(s) IV Push every 4 hours PRN Moderate Pain (4 - 6)  HYDROmorphone  Injectable 1 milliGRAM(s) IV Push every 4 hours PRN Severe Pain (7 - 10)  nicotine  Polacrilex Gum 2 milliGRAM(s) Oral every 4 hours PRN nicotine craving  ondansetron Injectable 4 milliGRAM(s) IV Push every 6 hours PRN Nausea    Vital Signs Last 24 Hrs  T(C): 36.9 (03 Jun 2019 09:04), Max: 37.4 (03 Jun 2019 05:02)  T(F): 98.4 (03 Jun 2019 09:04), Max: 99.4 (03 Jun 2019 05:02)  HR: 99 (03 Jun 2019 09:24) (82 - 99)  BP: 164/87 (03 Jun 2019 09:24) (146/94 - 179/103)  BP(mean): 119 (03 Jun 2019 09:24) (115 - 137)  RR: 19 (03 Jun 2019 09:24) (17 - 19)  SpO2: 99% (03 Jun 2019 09:24) (96% - 99%)    06-02 @ 07:01  -  06-03 @ 07:00  --------------------------------------------------------  IN: 2600 mL / OUT: 1950 mL / NET: 650 mL    06-03 @ 07:01  -  06-03 @ 10:52  --------------------------------------------------------  IN: 400 mL / OUT: 0 mL / NET: 400 mL      PHYSICAL EXAM:    General: Well developed; well nourished; in no acute distress  HEENT: MMM, conjunctiva and sclera clear  Gastrointestinal: Soft non-tender non-distended; Normal bowel sounds; No hepatosplenomegaly. No rebound or guarding  Skin: Warm and dry. No obvious rash    LABS:                        11.6   10.57 )-----------( 128      ( 03 Jun 2019 06:27 )             35.0     06-03    135  |  98  |  6<L>  ----------------------------<  80  3.5   |  24  |  0.62    Ca    9.3      03 Jun 2019 06:27  Phos  3.6     06-03  Mg     1.6     06-03    TPro  7.1  /  Alb  3.3  /  TBili  1.7<H>  /  DBili  x   /  AST  31  /  ALT  22  /  AlkPhos  118  06-03                      Culture - Urine (collected 01 Jun 2019 09:24)  Source: .Urine Clean Catch (Midstream)  Final Report (02 Jun 2019 08:09):    No growth    Culture - Blood (collected 01 Jun 2019 08:34)  Source: .Blood Blood-Venous  Preliminary Report (03 Jun 2019 09:00):    No growth at 2 days.    Culture - Blood (collected 01 Jun 2019 08:34)  Source: .Blood Blood-Venous  Preliminary Report (03 Jun 2019 09:00):    No growth at 2 days.      RADIOLOGY & ADDITIONAL STUDIES:

## 2019-06-03 NOTE — PROGRESS NOTE ADULT - ASSESSMENT
54 yo M active smoker and alcoholic, PMHx of HLD, HTN, CAD s/p angioplasty, Hx of splenic hemorrhage and subcapsular collection (resolved), now p/w acute pancreatitis with pancreatic tail collection. +enterorhino virus positive. Patient w/ necrotizing pancreatitis vs pseudocyst     Pain and nausea control  IVF and NPO  GI consult (Yuliana) TRO gall stone pancreatitis   IV.Zosyn   DVT prophylaxis  OOB/IS

## 2019-06-03 NOTE — PROGRESS NOTE ADULT - ASSESSMENT
52 yo M, active smoker ( 40packs year), hx of alcohol abuse, CAD s/p angioplasty (years ago), HTN, chronic pancreatitis, hx of unprovoked splenic hemorrhage with collections, who presents with pancreatitis    Pancreatitis  -pt meets 2/3 criteria for diagnosis of pancreatitis- typical abd pain, CT findings of pancreatitis with concern for necrosis vs pseudocyst   -high suspicion for alcohol induced pancreatitis- given symptoms started after binge drinking  - CT A/P and Abd US however shows no stones, no ductal dilation, low suspicion for gallstone pancreatitis  -MRCP pending  -IVF  -trend LFTS-bili downtrending  -can advance to clears  -ETOH cessation  -will determine need for ERCP based on further workup  -will need interval follow up of pseudocyst to r/o pancreatic lesion 52 yo M, active smoker ( 40packs year), hx of alcohol abuse, CAD s/p angioplasty (years ago), HTN, chronic pancreatitis, hx of unprovoked splenic hemorrhage with collections, who presents with pancreatitis    Pancreatitis  -pt meets 2/3 criteria for diagnosis of pancreatitis- typical abd pain, CT findings of pancreatitis with concern for necrosis vs pseudocyst   -high suspicion for alcohol induced pancreatitis- given symptoms started after binge drinking  - CT A/P and Abd US however shows no stones, no ductal dilation, low suspicion for gallstone pancreatitis  -MRCP shows no evidence of stones  -IVF  -trend LFTS-bili downtrending  -can advance to clears  -ETOH cessation  -will need interval follow up of pseudocyst to r/o pancreatic lesion       GI will sign off. Please reconsult as needed. Thank you.

## 2019-06-04 ENCOUNTER — TRANSCRIPTION ENCOUNTER (OUTPATIENT)
Age: 54
End: 2019-06-04

## 2019-06-04 VITALS
HEART RATE: 82 BPM | TEMPERATURE: 96 F | SYSTOLIC BLOOD PRESSURE: 167 MMHG | OXYGEN SATURATION: 99 % | DIASTOLIC BLOOD PRESSURE: 96 MMHG | RESPIRATION RATE: 17 BRPM

## 2019-06-04 LAB
ALBUMIN SERPL ELPH-MCNC: 3.3 G/DL — SIGNIFICANT CHANGE UP (ref 3.3–5)
ALP SERPL-CCNC: 104 U/L — SIGNIFICANT CHANGE UP (ref 40–120)
ALT FLD-CCNC: 21 U/L — SIGNIFICANT CHANGE UP (ref 10–45)
ANION GAP SERPL CALC-SCNC: 11 MMOL/L — SIGNIFICANT CHANGE UP (ref 5–17)
AST SERPL-CCNC: 25 U/L — SIGNIFICANT CHANGE UP (ref 10–40)
BILIRUB SERPL-MCNC: 1.2 MG/DL — SIGNIFICANT CHANGE UP (ref 0.2–1.2)
BUN SERPL-MCNC: 4 MG/DL — LOW (ref 7–23)
CALCIUM SERPL-MCNC: 9.2 MG/DL — SIGNIFICANT CHANGE UP (ref 8.4–10.5)
CHLORIDE SERPL-SCNC: 98 MMOL/L — SIGNIFICANT CHANGE UP (ref 96–108)
CO2 SERPL-SCNC: 27 MMOL/L — SIGNIFICANT CHANGE UP (ref 22–31)
CREAT SERPL-MCNC: 0.68 MG/DL — SIGNIFICANT CHANGE UP (ref 0.5–1.3)
GLUCOSE SERPL-MCNC: 112 MG/DL — HIGH (ref 70–99)
HCT VFR BLD CALC: 33 % — LOW (ref 39–50)
HGB BLD-MCNC: 10.9 G/DL — LOW (ref 13–17)
MAGNESIUM SERPL-MCNC: 1.8 MG/DL — SIGNIFICANT CHANGE UP (ref 1.6–2.6)
MCHC RBC-ENTMCNC: 32.2 PG — SIGNIFICANT CHANGE UP (ref 27–34)
MCHC RBC-ENTMCNC: 33 GM/DL — SIGNIFICANT CHANGE UP (ref 32–36)
MCV RBC AUTO: 97.3 FL — SIGNIFICANT CHANGE UP (ref 80–100)
NRBC # BLD: 0 /100 WBCS — SIGNIFICANT CHANGE UP (ref 0–0)
PHOSPHATE SERPL-MCNC: 3.3 MG/DL — SIGNIFICANT CHANGE UP (ref 2.5–4.5)
PLATELET # BLD AUTO: 142 K/UL — LOW (ref 150–400)
POTASSIUM SERPL-MCNC: 3.4 MMOL/L — LOW (ref 3.5–5.3)
POTASSIUM SERPL-SCNC: 3.4 MMOL/L — LOW (ref 3.5–5.3)
PROT SERPL-MCNC: 6.9 G/DL — SIGNIFICANT CHANGE UP (ref 6–8.3)
RBC # BLD: 3.39 M/UL — LOW (ref 4.2–5.8)
RBC # FLD: 12.1 % — SIGNIFICANT CHANGE UP (ref 10.3–14.5)
SODIUM SERPL-SCNC: 136 MMOL/L — SIGNIFICANT CHANGE UP (ref 135–145)
WBC # BLD: 9.44 K/UL — SIGNIFICANT CHANGE UP (ref 3.8–10.5)
WBC # FLD AUTO: 9.44 K/UL — SIGNIFICANT CHANGE UP (ref 3.8–10.5)

## 2019-06-04 PROCEDURE — 87486 CHLMYD PNEUM DNA AMP PROBE: CPT

## 2019-06-04 PROCEDURE — 87581 M.PNEUMON DNA AMP PROBE: CPT

## 2019-06-04 PROCEDURE — 96375 TX/PRO/DX INJ NEW DRUG ADDON: CPT

## 2019-06-04 PROCEDURE — 83605 ASSAY OF LACTIC ACID: CPT

## 2019-06-04 PROCEDURE — 85027 COMPLETE CBC AUTOMATED: CPT

## 2019-06-04 PROCEDURE — 96374 THER/PROPH/DIAG INJ IV PUSH: CPT | Mod: XU

## 2019-06-04 PROCEDURE — 87086 URINE CULTURE/COLONY COUNT: CPT

## 2019-06-04 PROCEDURE — 74181 MRI ABDOMEN W/O CONTRAST: CPT

## 2019-06-04 PROCEDURE — 84100 ASSAY OF PHOSPHORUS: CPT

## 2019-06-04 PROCEDURE — 86803 HEPATITIS C AB TEST: CPT

## 2019-06-04 PROCEDURE — 87798 DETECT AGENT NOS DNA AMP: CPT

## 2019-06-04 PROCEDURE — 99233 SBSQ HOSP IP/OBS HIGH 50: CPT

## 2019-06-04 PROCEDURE — 83690 ASSAY OF LIPASE: CPT

## 2019-06-04 PROCEDURE — 74177 CT ABD & PELVIS W/CONTRAST: CPT

## 2019-06-04 PROCEDURE — 83735 ASSAY OF MAGNESIUM: CPT

## 2019-06-04 PROCEDURE — 87633 RESP VIRUS 12-25 TARGETS: CPT

## 2019-06-04 PROCEDURE — 81003 URINALYSIS AUTO W/O SCOPE: CPT

## 2019-06-04 PROCEDURE — 82248 BILIRUBIN DIRECT: CPT

## 2019-06-04 PROCEDURE — 87040 BLOOD CULTURE FOR BACTERIA: CPT

## 2019-06-04 PROCEDURE — 80053 COMPREHEN METABOLIC PANEL: CPT

## 2019-06-04 PROCEDURE — 80307 DRUG TEST PRSMV CHEM ANLYZR: CPT

## 2019-06-04 PROCEDURE — 85025 COMPLETE CBC W/AUTO DIFF WBC: CPT

## 2019-06-04 PROCEDURE — 36415 COLL VENOUS BLD VENIPUNCTURE: CPT

## 2019-06-04 PROCEDURE — 84484 ASSAY OF TROPONIN QUANT: CPT

## 2019-06-04 PROCEDURE — 82150 ASSAY OF AMYLASE: CPT

## 2019-06-04 PROCEDURE — 99285 EMERGENCY DEPT VISIT HI MDM: CPT | Mod: 25

## 2019-06-04 PROCEDURE — 76705 ECHO EXAM OF ABDOMEN: CPT

## 2019-06-04 RX ORDER — MAGNESIUM SULFATE 500 MG/ML
1 VIAL (ML) INJECTION ONCE
Refills: 0 | Status: COMPLETED | OUTPATIENT
Start: 2019-06-04 | End: 2019-06-04

## 2019-06-04 RX ORDER — POTASSIUM CHLORIDE 20 MEQ
40 PACKET (EA) ORAL ONCE
Refills: 0 | Status: COMPLETED | OUTPATIENT
Start: 2019-06-04 | End: 2019-06-04

## 2019-06-04 RX ADMIN — HYDROMORPHONE HYDROCHLORIDE 1 MILLIGRAM(S): 2 INJECTION INTRAMUSCULAR; INTRAVENOUS; SUBCUTANEOUS at 01:13

## 2019-06-04 RX ADMIN — Medication 40 MILLIEQUIVALENT(S): at 10:33

## 2019-06-04 RX ADMIN — HYDROMORPHONE HYDROCHLORIDE 1 MILLIGRAM(S): 2 INJECTION INTRAMUSCULAR; INTRAVENOUS; SUBCUTANEOUS at 05:52

## 2019-06-04 RX ADMIN — HEPARIN SODIUM 5000 UNIT(S): 5000 INJECTION INTRAVENOUS; SUBCUTANEOUS at 05:53

## 2019-06-04 RX ADMIN — Medication 100 GRAM(S): at 10:34

## 2019-06-04 RX ADMIN — Medication 120 MILLIGRAM(S): at 06:53

## 2019-06-04 RX ADMIN — HYDROMORPHONE HYDROCHLORIDE 1 MILLIGRAM(S): 2 INJECTION INTRAMUSCULAR; INTRAVENOUS; SUBCUTANEOUS at 06:33

## 2019-06-04 RX ADMIN — PIPERACILLIN AND TAZOBACTAM 200 GRAM(S): 4; .5 INJECTION, POWDER, LYOPHILIZED, FOR SOLUTION INTRAVENOUS at 05:53

## 2019-06-04 RX ADMIN — HYDROMORPHONE HYDROCHLORIDE 0.5 MILLIGRAM(S): 2 INJECTION INTRAMUSCULAR; INTRAVENOUS; SUBCUTANEOUS at 10:50

## 2019-06-04 RX ADMIN — HYDROMORPHONE HYDROCHLORIDE 0.5 MILLIGRAM(S): 2 INJECTION INTRAMUSCULAR; INTRAVENOUS; SUBCUTANEOUS at 10:33

## 2019-06-04 RX ADMIN — SODIUM CHLORIDE 120 MILLILITER(S): 9 INJECTION INTRAMUSCULAR; INTRAVENOUS; SUBCUTANEOUS at 06:54

## 2019-06-04 NOTE — DISCHARGE NOTE NURSING/CASE MANAGEMENT/SOCIAL WORK - NSDCVIVACCINE_GEN_ALL_CORE_FT
Haemophilus Influenza, type b , 2017/5/30 17:32 , Ely Fraire (RN)  Meningococcal , 2017/5/30 17:35 , Ely Fraire (RN)  Pneumococcal , 2017/5/30 17:34 , Ely Fraire (CTILALI)

## 2019-06-04 NOTE — PROGRESS NOTE ADULT - ASSESSMENT
54 yo M active smoker and alcoholic, PMHx of HLD, HTN, CAD s/p angioplasty, Hx of splenic hemorrhage and subcapsular collection (resolved), now p/w acute pancreatitis with pancreatic tail collection. +enterorhino virus positive. Patient w/ necrotizing pancreatitis vs pseudocyst     Pain and nausea control  LFD  DVT prophylaxis  OOB/IS

## 2019-06-04 NOTE — DISCHARGE NOTE PROVIDER - HOSPITAL COURSE
52 yo M active smoker and alcoholic, PMHx of HLD, HTN, CAD s/p angioplasty, Hx of splenic hemorrhage and subcapsular collection (resolved), now p/w acute pancreatitis with pancreatic tail collection. +enterorhino virus positive. Patient  admitted for concern for necrotizing gallstone pancreatitis vs pseudocyst, however, high suspicion for alcohol induced pancreatitis- given symptoms started after binge drinking. Started on IV antibiotics. MRCP showed No cholelithiasis or choledocholithiasis. No biliary dilatation. Evidence of acute pancreatitis involving the pancreatic tail. Remainder of patient's hospital course was uncomplicated. LFTs downtrending, Diet was advanced as tolerated and pain was well controlled on medication. On day of discharge, pt deemed stable and ready to return home with plan to follow up as an outpatient.

## 2019-06-04 NOTE — DISCHARGE NOTE PROVIDER - NSDCFUADDINST_GEN_ALL_CORE_FT
-Follow up with Dr. Rios in 1-2 weeks. You need to set up an appointment to followup your pancreatic pseudocyst. Call the office at [phone] to schedule your appointment.   - Maintain hydration, 2L of fluids a day. Take 2 tabs tylenol every 6 hours as needed for pain management. Take 1-2 tabs advil with food every 6 hours for additional pain, WITH FOOD.    -You should be urinating at least 3-4x per day. Call the office if you experience increasing pain, nausea, vomiting, temperature >101.4F

## 2019-06-04 NOTE — PROGRESS NOTE ADULT - REASON FOR ADMISSION
Acute pancreatitis

## 2019-06-04 NOTE — PROGRESS NOTE ADULT - SUBJECTIVE AND OBJECTIVE BOX
SUBJECTIVE:   No acute events overnight.   Patient  pain controlled, out of bed ambulating.  Tolerating low fat diet. Denies nausea/vomiting  +Bowel movements, +Flatus    ---------------------------------------------------------------    Vital Signs Last 24 Hrs  T(C): 35.7 (04 Jun 2019 08:30), Max: 37.4 (03 Jun 2019 14:49)  T(F): 96.3 (04 Jun 2019 08:30), Max: 99.4 (03 Jun 2019 14:49)  HR: 82 (04 Jun 2019 08:30) (82 - 92)  BP: 167/96 (04 Jun 2019 08:30) (152/92 - 172/100)  BP(mean): 125 (03 Jun 2019 11:55) (125 - 125)  RR: 17 (04 Jun 2019 08:30) (17 - 18)  SpO2: 99% (04 Jun 2019 08:30) (96% - 99%)    I&O's Detail    03 Jun 2019 07:01  -  04 Jun 2019 07:00  --------------------------------------------------------  IN:    IV PiggyBack: 150 mL    Oral Fluid: 380 mL    sodium chloride 0.9%: 1200 mL    sodium chloride 0.9%.: 2100 mL    Solution: 100 mL  Total IN: 3930 mL    OUT:  Total OUT: 0 mL    Total NET: 3930 mL      04 Jun 2019 07:01  -  04 Jun 2019 10:58  --------------------------------------------------------  IN:    Oral Fluid: 320 mL  Total IN: 320 mL    OUT:  Total OUT: 0 mL    Total NET: 320 mL          ----------------------------------------------------------------    Physical Exam:  General: NAD, Comfortable in bed     Neuro: A&Ox3  Respiratory: nonlabored breathing, no respiratory distress    Abd: soft, nontender, nondistended,  Extremities: WWP, nonedematous, SCDs in place      -------------------------------------------------------------------    LABS:                        10.9   9.44  )-----------( 142      ( 04 Jun 2019 07:00 )             33.0     06-04    136  |  98  |  4<L>  ----------------------------<  112<H>  3.4<L>   |  27  |  0.68    Ca    9.2      04 Jun 2019 07:00  Phos  3.3     06-04  Mg     1.8     06-04    TPro  6.9  /  Alb  3.3  /  TBili  1.2  /  DBili  x   /  AST  25  /  ALT  21  /  AlkPhos  104  06-04            RADIOLOGY & ADDITIONAL STUDIES:

## 2019-06-04 NOTE — DISCHARGE NOTE NURSING/CASE MANAGEMENT/SOCIAL WORK - NSDCDPATPORTLINK_GEN_ALL_CORE
You can access the [facility] Patient Portal, offered by Health system, by registering with the following website: [email or web address]

## 2019-06-06 LAB
CULTURE RESULTS: SIGNIFICANT CHANGE UP
CULTURE RESULTS: SIGNIFICANT CHANGE UP
SPECIMEN SOURCE: SIGNIFICANT CHANGE UP
SPECIMEN SOURCE: SIGNIFICANT CHANGE UP

## 2019-06-07 DIAGNOSIS — E87.1 HYPO-OSMOLALITY AND HYPONATREMIA: ICD-10-CM

## 2019-06-07 DIAGNOSIS — E86.0 DEHYDRATION: ICD-10-CM

## 2019-06-07 DIAGNOSIS — Z81.1 FAMILY HISTORY OF ALCOHOL ABUSE AND DEPENDENCE: ICD-10-CM

## 2019-06-07 DIAGNOSIS — F17.210 NICOTINE DEPENDENCE, CIGARETTES, UNCOMPLICATED: ICD-10-CM

## 2019-06-07 DIAGNOSIS — E78.5 HYPERLIPIDEMIA, UNSPECIFIED: ICD-10-CM

## 2019-06-07 DIAGNOSIS — E87.6 HYPOKALEMIA: ICD-10-CM

## 2019-06-07 DIAGNOSIS — I10 ESSENTIAL (PRIMARY) HYPERTENSION: ICD-10-CM

## 2019-06-07 DIAGNOSIS — K85.21 ALCOHOL INDUCED ACUTE PANCREATITIS WITH UNINFECTED NECROSIS: ICD-10-CM

## 2019-06-07 DIAGNOSIS — I25.10 ATHEROSCLEROTIC HEART DISEASE OF NATIVE CORONARY ARTERY WITHOUT ANGINA PECTORIS: ICD-10-CM

## 2019-06-07 DIAGNOSIS — F10.20 ALCOHOL DEPENDENCE, UNCOMPLICATED: ICD-10-CM

## 2019-06-07 DIAGNOSIS — B34.8 OTHER VIRAL INFECTIONS OF UNSPECIFIED SITE: ICD-10-CM

## 2019-06-07 DIAGNOSIS — K86.0 ALCOHOL-INDUCED CHRONIC PANCREATITIS: ICD-10-CM

## 2019-06-07 DIAGNOSIS — B34.1 ENTEROVIRUS INFECTION, UNSPECIFIED: ICD-10-CM

## 2019-06-30 ENCOUNTER — EMERGENCY (EMERGENCY)
Facility: HOSPITAL | Age: 54
LOS: 1 days | Discharge: HOME | End: 2019-06-30
Admitting: EMERGENCY MEDICINE
Payer: MEDICAID

## 2019-06-30 VITALS
TEMPERATURE: 98 F | SYSTOLIC BLOOD PRESSURE: 159 MMHG | HEART RATE: 98 BPM | WEIGHT: 218.04 LBS | DIASTOLIC BLOOD PRESSURE: 96 MMHG | RESPIRATION RATE: 20 BRPM | HEIGHT: 73 IN | OXYGEN SATURATION: 97 %
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
## 2019-06-30 DIAGNOSIS — Z23 ENCOUNTER FOR IMMUNIZATION: ICD-10-CM
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
## 2019-06-30 DIAGNOSIS — S01.01XA LACERATION WITHOUT FOREIGN BODY OF SCALP, INITIAL ENCOUNTER: ICD-10-CM

## 2019-06-30 DIAGNOSIS — Z79.899 OTHER LONG TERM (CURRENT) DRUG THERAPY: ICD-10-CM
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
## 2019-06-30 DIAGNOSIS — I10 ESSENTIAL (PRIMARY) HYPERTENSION: ICD-10-CM

## 2019-06-30 DIAGNOSIS — S09.90XA UNSPECIFIED INJURY OF HEAD, INITIAL ENCOUNTER: ICD-10-CM

## 2019-06-30 PROCEDURE — 99284 EMERGENCY DEPT VISIT MOD MDM: CPT | Mod: 25

## 2019-06-30 PROCEDURE — 12002 RPR S/N/AX/GEN/TRNK2.6-7.5CM: CPT

## 2019-06-30 PROCEDURE — 70450 CT HEAD/BRAIN W/O DYE: CPT

## 2019-06-30 PROCEDURE — 72100 X-RAY EXAM L-S SPINE 2/3 VWS: CPT | Mod: 26

## 2019-06-30 PROCEDURE — 72125 CT NECK SPINE W/O DYE: CPT

## 2019-06-30 PROCEDURE — 90471 IMMUNIZATION ADMIN: CPT

## 2019-06-30 PROCEDURE — 72125 CT NECK SPINE W/O DYE: CPT | Mod: 26

## 2019-06-30 PROCEDURE — 70450 CT HEAD/BRAIN W/O DYE: CPT | Mod: 26

## 2019-06-30 PROCEDURE — 72100 X-RAY EXAM L-S SPINE 2/3 VWS: CPT

## 2019-06-30 PROCEDURE — 90715 TDAP VACCINE 7 YRS/> IM: CPT

## 2019-06-30 RX ORDER — TETANUS TOXOID, REDUCED DIPHTHERIA TOXOID AND ACELLULAR PERTUSSIS VACCINE, ADSORBED 5; 2.5; 8; 8; 2.5 [IU]/.5ML; [IU]/.5ML; UG/.5ML; UG/.5ML; UG/.5ML
0.5 SUSPENSION INTRAMUSCULAR ONCE
Refills: 0 | Status: COMPLETED | OUTPATIENT
Start: 2019-06-30 | End: 2019-06-30

## 2019-06-30 RX ADMIN — TETANUS TOXOID, REDUCED DIPHTHERIA TOXOID AND ACELLULAR PERTUSSIS VACCINE, ADSORBED 0.5 MILLILITER(S): 5; 2.5; 8; 8; 2.5 SUSPENSION INTRAMUSCULAR at 19:26

## 2019-06-30 NOTE — ED ADULT NURSE NOTE - CHPI ED NUR SYMPTOMS NEG
no numbness/no weakness/no confusion/no deformity/no loss of consciousness/no tingling/no fever/no vomiting

## 2019-06-30 NOTE — ED PROVIDER NOTE - NSFOLLOWUPINSTRUCTIONS_ED_ALL_ED_FT
follow up with your primary care physician this week. follow up in the Emergency Department in 7 days for staple removal.     EnglishSpanish    Sutures, Staples, or Adhesive Wound Closure  Doctors use stitches (sutures), staples, and glue (skin adhesives) to hold your skin together while it heals (wound closure). What your doctor uses depends on your wound.     In most cases, your wound will be closed right away (primary skin closure). Sometimes it may be closed later so that it can be cleaned and then heal naturally (delayed wound closure).    What are the types of wound closure?  Adhesive glue     To use adhesive glue, your doctor:  Holds the edges of the wound together.  Paints the glue onto your skin. You may need more than one layer.  Covers your wound with a bandage (dressing) after the glue is dry.  Adhesive glue may be used for:  Wounds that are not deep (superficial wounds).  Wounds on the face.  Children's wounds.  Adhesive glue is not used inside of wounds, or on wounds that are:  Deep.  Uneven.  Bleeding.  Some benefits of adhesive glue are:  It leaves nothing that needs to be removed.  You do not need medicine to numb the area.  You have less pain than with other types of closure.  Adhesive strips     Adhesive strips are:  Made of paper that is sticky (adhesive) and has many small holes it in (porous).  Placed across your wound edges, like a normal bandage.  Used to close very shallow wounds.  Sometimes used with sutures to help improve closure.  Sutures    Sutures come in many different materials, strengths, and sizes. Some sutures break down as your wound heals (absorbable). Other sutures need to be removed (nonabsorbable).    To use sutures, your doctor:  Sews your skin together with sutures and a needle.  May use one long (continuous) stitch or separate stitches.  Ties and cuts the sutures at the end.  Sutures can be used for all types of wounds, including under the skin. They can cause a skin reaction that can lead to infection.    Staples    Staples are often used to close surgical cuts (incisions). To use staples, your doctor:  Holds the edges of your wound close together.  Places a staple across the wound.  Uses a tool to secure the staple to the skin.  Repeats this with as many staples as needed.  Staples are faster to use than sutures, and they cause less reaction from your skin. Staples need to be removed using a tool that bends the staples away from your skin.    Follow these instructions at home:  Medicines     Take over-the-counter and prescription medicines only as told by your doctor.  If you were prescribed an antibiotic medicine, take it as told by your doctor. Do not stop taking the antibiotic even if you start to feel better.  Wound care     Image   Follow instructions from your doctor about how to take care of your wound and bandage.  Wash your hands with soap and water before and after touching your wound or bandage. If you cannot use soap and water, use hand .  Do not try to remove your wound closures unless your doctor tells you to do that. You may need a follow-up visit for your doctor to remove your closures.  Closures may stay in place for 2 weeks or longer.  Absorbable sutures may break down after a few days or weeks.  If adhesive strip edges start to loosen and curl up, you may trim the loose edges.  Do not pick at your wound. Picking can cause an infection.  Apply ointments or creams only as told by your doctor.  Check your wound every day for signs of infection. Check for:  Redness, swelling, or pain.  Fluid or blood.  Warmth.  Pus or a bad smell.  General instructions     Do not take baths, swim, or use a hot tub until your doctor approves. Ask your doctor if you may take showers. You may only be allowed to take sponge baths.  Do not soak your wound in water.  Keep all follow-up visits as told by your doctor. This is important.  Contact a doctor if you:  Have any of the following:  A fever.  Chills.  Redness, swelling, or pain around your wound.  Fluid or blood coming from your wound.  Pus or a bad smell coming from your wound.  Notice that your wound feels warm to the touch.  Notice that the edges of your wound start to separate after your sutures come out.  Notice that your wound becomes thick, raised, and darker in color after your sutures come out (scarring).  Summary  What your doctor uses to hold your skin together while it heals (wound closure) depends on your wound.  Your doctor may use stitches (sutures), staples, and glue (skin adhesives).  Do not try to remove your wound closures unless your doctor tells you to do that.  Do not soak your wound in water.  This information is not intended to replace advice given to you by your health care provider. Make sure you discuss any questions you have with your health care provider.    Document Released: 10/15/2010 Document Revised: 10/25/2018 Document Reviewed: 10/25/2018  [employer] Interactive Patient Education © 2019 [employer]      Head Injury    WHAT YOU NEED TO KNOW:    A head injury is most often caused by a blow to the head. This may occur from a fall, bicycle injury, sports injury, being struck in the head, or a motor vehicle accident.     DISCHARGE INSTRUCTIONS:    Call 911 or have someone else call for any of the following:     You cannot be woken.      You have a seizure.      You stop responding to others or you faint.      You have blurry or double vision.      Your speech becomes slurred or confused.      You have arm or leg weakness, loss of feeling, or new problems with coordination.      Your pupils are larger than usual or one pupil is a different size than the other.       You have blood or clear fluid coming out of your ears or nose.    Return to the emergency department if:     You have repeated or forceful vomiting.      You feel confused.      Your headache gets worse or becomes severe.      You or someone caring for you notices that you are harder to wake than usual.    Contact your healthcare provider if:     Your symptoms last longer than 6 weeks after the injury.      You have questions or concerns about your condition or care.    Medicines:     Acetaminophen decreases pain. Acetaminophen is available without a doctor's order. Ask how much to take and how often to take it. Follow directions. Acetaminophen can cause liver damage if not taken correctly.      Take your medicine as directed. Contact your healthcare provider if you think your medicine is not helping or if you have side effects. Tell him or her if you are allergic to any medicine. Keep a list of the medicines, vitamins, and herbs you take. Include the amounts, and when and why you take them. Bring the list or the pill bottles to follow-up visits. Carry your medicine list with you in case of an emergency.    Self-care:     Rest or do quiet activities for 24 to 48 hours. Limit your time watching TV, using the computer, or doing tasks that require a lot of thinking. Slowly return to your normal activities as directed. Do not play sports or do activities that may cause you to get hit in the head. Ask your healthcare provider when you can return to sports.       Apply ice on your head for 15 to 20 minutes every hour or as directed. Use an ice pack, or put crushed ice in a plastic bag. Cover it with a towel before you apply it to your skin. Ice helps prevent tissue damage and decreases swelling and pain.       Have someone stay with you for 24 hours or as directed. This person can monitor you for complications and call 911. When you are awake the person should ask you a few questions to see if you are thinking clearly. An example would be to ask your name or your address.     Prevent another head injury:     Wear a helmet that fits properly. Do this when you play sports, or ride a bike, scooter, or skateboard. Helmets help decrease your risk of a serious head injury. Talk to your healthcare provider about other ways you can protect yourself if you play sports.      Wear your seat belt every time you are in a car. This helps to decrease your risk for a head injury if you are in a car accident.     Follow up with your healthcare provider as directed: Write down your questions so you remember to ask them during your visits.         Log Out.    [employer] CareNotes®     :  [facility]             ACUTE LOW BACK PAIN - AfterCare(R) Instructions(ER/ED)     Acute Low Back Pain    WHAT YOU NEED TO KNOW:    Acute low back pain is sudden discomfort in your lower back area that lasts for up to 6 weeks. The discomfort makes it difficult to tolerate activity.          DISCHARGE INSTRUCTIONS:    Return to the emergency department if:     You have severe pain.      You have sudden stiffness and heaviness on both buttocks down to both legs.      You have numbness or weakness in one leg, or pain in both legs.      You have numbness in your genital area or across your lower back.      You cannot control your urine or bowel movements.    Contact your healthcare provider if:     You have a fever.      You have pain at night or when you rest.      Your pain does not get better with treatment.      You have pain that worsens when you cough or sneeze.      You suddenly feel something pop or snap in your back.      You have questions or concerns about your condition or care.    Medicines: You may need any of the following:     NSAIDs help decrease swelling and pain. This medicine is available with or without a doctor's order. NSAIDs can cause stomach bleeding or kidney problems in certain people. If you take blood thinner medicine, always ask your healthcare provider if NSAIDs are safe for you. Always read the medicine label and follow directions.      Acetaminophen decreases pain and fever. It is available without a doctor's order. Ask how much to take and how often to take it. Follow directions. Read the labels of all other medicines you are using to see if they also contain acetaminophen, or ask your doctor or pharmacist. Acetaminophen can cause liver damage if not taken correctly. Do not use more than 4 grams (4,000 milligrams) total of acetaminophen in one day.       Muscle relaxers decrease pain by relaxing the muscles in your lower spine.      Prescription pain medicine may be given. Ask your healthcare provider how to take this medicine safely. Some prescription pain medicines contain acetaminophen. Do not take other medicines that contain acetaminophen without talking to your healthcare provider. Too much acetaminophen may cause liver damage. Prescription pain medicine may cause constipation. Ask your healthcare provider how to prevent or treat constipation.     Self-care:     Stay active as much as you can without causing more pain. Bed rest could make your back pain worse. Start with some light exercises, such as walking. Avoid heavy lifting until your pain is gone. Ask for more information about the activities or exercises that are right for you.       Apply ice on your back for 15 to 20 minutes every hour or as directed. Use an ice pack, or put crushed ice in a plastic bag. Cover it with a towel before you apply it to your skin. Ice helps prevent tissue damage and decreases swelling and pain.       Apply heat on your back for 20 to 30 minutes every 2 hours for as many days as directed. Heat helps decrease pain and muscle spasms. Alternate heat and ice.    Prevent acute low back pain:     Use proper body mechanics.   Bend at the hips and knees when you  objects. Do not bend from the waist. Use your leg muscles as you lift the load. Do not use your back. Keep the object close to your chest as you lift it. Try not to twist or lift anything above your waist.      Change your position often when you stand for long periods of time. Rest one foot on a small box or footrest, and then switch to the other foot often.      Try not to sit for long periods of time. When you do, sit in a straight-backed chair with your feet flat on the floor. Never reach, pull, or push while you are sitting.      Do exercises that strengthen your back muscles. Warm up before you exercise. Ask your healthcare provider the best exercises for you.      Maintain a healthy weight. Ask your healthcare provider how much you should weigh. Ask him or her to help you create a weight loss plan if you are overweight.    Follow up with your healthcare provider as directed: Return for a follow-up visit if you still have pain after 1 to 3 weeks of treatment. You may need to visit an orthopedist if your back pain lasts longer than 12 weeks. Write down your questions so you remember to ask them during your visits.       © Copyright [employer] 2019 All illustrations and images included in CareNotes are the copyrighted property of [employer] or [employer].      back to top                      © Copyright [employer] 2019            © Copyright [employer] 2019 All illustrations and images included in CareNotes are the copyrighted property of [employer] or [employer].      back to top                      © Copyright [employer] 2019

## 2019-06-30 NOTE — ED ADULT TRIAGE NOTE - CHIEF COMPLAINT QUOTE
Patient complaining of laceration to posterior scalp after mechanical fall at home.  No active bleeding noted.  Patient denies any dizziness prior to fall, blood thinners, LOC or any other complaints at this time.

## 2019-06-30 NOTE — ED ADULT NURSE NOTE - OBJECTIVE STATEMENT
Pt presents reporting single mechanical fall today. Pt reports slipping while exiting the shower, states he hit the back of his head on the door frame. Pt presents with deep 4cm lac to the back of his head. Lac is clean, beefy red, bleeding minimally. Pt reports no headache, states no LOC at time of fall. Pt reports no recent anticoagulant therapy.

## 2019-06-30 NOTE — ED PROVIDER NOTE - MUSCULOSKELETAL, MLM
Spine appears normal, range of motion is not limited, no midline tenderness to c spine, t spine or l spine. no neck tenderness. FROM. + tenderness to right lower back. no deformity. no bruising or redness. strength 5/5 b/l LE and b/l UE. distal sensaiotn intact.

## 2019-06-30 NOTE — ED PROVIDER NOTE - CLINICAL SUMMARY MEDICAL DECISION MAKING FREE TEXT BOX
head injury with scalp laceration and neck and low back pain. neuro exam intact. ct head and neck no acute pathology. x-ray lumbar spine no fx. td updated,. lac repaired. wound care d/w pt. f/u in 7 days for suture removal and f/u with pmd

## 2019-06-30 NOTE — ED PROVIDER NOTE - OBJECTIVE STATEMENT
52 y/o male with hx of etoh abuse, pancreatitis, CAD, HTN c/o scalp laceration x 1 hr. pt states slipped and fell at home striking back of head on door edge. no loc, ha or dizziness. pt reports mild discomfort left neck. no numbness, tingling or weakness. Also pt notes pain to lower back. no radation of pain. no incontinence. no abd pain, n./v,. no further complaints.

## 2019-07-09 ENCOUNTER — EMERGENCY (EMERGENCY)
Facility: HOSPITAL | Age: 54
LOS: 1 days | Discharge: HOME | End: 2019-07-09
Admitting: EMERGENCY MEDICINE
Payer: MEDICAID

## 2019-07-09 VITALS
HEART RATE: 100 BPM | OXYGEN SATURATION: 96 % | TEMPERATURE: 98 F | SYSTOLIC BLOOD PRESSURE: 154 MMHG | DIASTOLIC BLOOD PRESSURE: 89 MMHG | RESPIRATION RATE: 16 BRPM

## 2019-07-09 PROCEDURE — 99212 OFFICE O/P EST SF 10 MIN: CPT

## 2019-07-09 NOTE — ED ADULT NURSE NOTE - CHPI ED NUR SYMPTOMS NEG
no bleeding/no bleeding at site/no chills/no inflammation/no drainage/no fever/no pain/no purulent drainage/no redness

## 2019-07-09 NOTE — ED PROVIDER NOTE - OBJECTIVE STATEMENT
54 yo M here for staple removal. Staples placed to scalp on 6/30. Denies fever, chills, ha, dizziness, n/v.

## 2019-07-09 NOTE — ED PROVIDER NOTE - CLINICAL SUMMARY MEDICAL DECISION MAKING FREE TEXT BOX
52 yo M here for staple removal. Staples placed to scalp on 6/30. Denies fever, chills, ha, dizziness, n/v. well healed laceration to scalp. Staples removed.

## 2019-07-09 NOTE — ED PROVIDER NOTE - PHYSICAL EXAMINATION
CONSTITUTIONAL: Well-appearing; well-nourished; in no apparent distress.   HEAD: Normocephalic;  well healed laceration to scalp   EYES: PERRL; EOM intact; conjunctiva and sclera clear  ENT: normal nose; no rhinorrhea; normal pharynx with no erythema or lesions.   NECK: Supple; non-tender;   CARDIOVASCULAR: Normal S1, S2; no murmurs, rubs, or gallops. Regular rate and rhythm.   RESPIRATORY: Breathing easily; breath sounds clear and equal bilaterally; no wheezes, rhonchi, or rales.  MSK: FROM at all extremities, normal tone   EXT: No cyanosis or edema; N/V intact  SKIN: Normal for age and race; warm; dry; good turgor; no apparent lesions or rash.

## 2019-07-09 NOTE — ED ADULT NURSE NOTE - NS ED NURSE LEVEL OF CONSCIOUSNESS AFFECT
Chief Complaint   Patient presents with    Well Child     2 y/o check up     SUBJECTIVE:   Ariana Garcia is a 3 y.o. female who presents to the office today with mother for routine health care examination. PMH: recurrent headaches still  Has seen Dr. Mike Alanis and mainor with as needed management with ibuprofen    Medications: reviewed medication list in the chart  Allergies: reviewed allergy section in the chart  Review of Systems: Negative for chest pain and shortness of breath  Immunization status: up to date and documented. Mainor for flu vaccine today    FH: no sig issues    SH: presently in grade 4; doing well in school. Current child-care arrangements: in home: primary caregiver: mother   Parental coping and self-care: Doing well; some early pubertal changes; Reviewed ha hx   Secondhand smoke exposure? no    At the start of the appointment, I reviewed the patient's [facility] Epic Chart (including Media scanned in from previous providers) for the active Problem List, all pertinent Past Medical Hx, medications, recent radiologic and laboratory findings. In addition, I reviewed pt's documented Immunization Record and Encounter History. ROS: No unusual headaches or abdominal pain. No cough, wheezing, shortness of breath, bowel or bladder problems. Diet is good--fruits and veggies:  Very good; Adequate dairy: yes and 1%;  Good protein and carb intake good water intake as well  Routine dental visits and daily hygeine  Recent script for glasses post eye exam and sl improved headaches since  Output issues:  No constipation. Dry qhs  Sleep is normal without issue  Exercise:   Active and gymnastics on the weekends    OBJECTIVE:   Visit Vitals    /62    Pulse 72    Temp 98.4 °F (36.9 °C) (Oral)    Ht (!) 4' 6.72\" (1.39 m)    Wt 75 lb 3.2 oz (34.1 kg)    SpO2 98%    BMI 17.65 kg/m2     GENERAL: WDWN female  EYES: PERRLA, EOMI, fundi grossly normal  EARS: TM's gray  VISION and HEARING: Normal grossly on exam.  NOSE: nasal passages clear  OP:  Clear without exudate or erythema. Tonsils 1 +  NECK: supple, no masses, no lymphadenopathy  RESP: clear to auscultation bilaterally  Jerome 2 breast  CV: RRR, normal O3/L0, no murmurs, clicks, or rubs. ABD: soft, nontender, no masses, no hepatosplenomegaly  : normal female exam, Jerome I  MS: spine straight, FROM all joints  SKIN: no rashes or lesions  Results for orders placed or performed in visit on 09/25/17   AMB POC URINALYSIS DIP STICK AUTO W/O MICRO   Result Value Ref Range    Color (UA POC) Yellow     Clarity (UA POC) Clear     Glucose (UA POC) Negative Negative    Bilirubin (UA POC) Negative Negative    Ketones (UA POC) Negative Negative    Specific gravity (UA POC) 1.020 1.001 - 1.035    Blood (UA POC) Trace Negative    pH (UA POC) 7.0 4.6 - 8.0    Protein (UA POC) Negative Negative mg/dL    Urobilinogen (UA POC) 0.2 mg/dL 0.2 - 1    Nitrites (UA POC) Negative Negative    Leukocyte esterase (UA POC) Negative Negative       ASSESSMENT and PLAN:   Well Child    ICD-10-CM ICD-9-CM    1. Encounter for routine child health examination without abnormal findings Z00.129 V20.2 AMB POC URINALYSIS DIP STICK AUTO W/O MICRO   2. Encounter for immunization Z23 V03.89 INFLUENZA VIRUS VAC QUAD,SPLIT,PRESV FREE SYRINGE IM      WA IM ADM THRU 18YR ANY RTE 1ST/ONLY COMPT VAC/TOX   3. Intractable migraine without aura and without status migrainosus G43.019 346.11    4. BMI (body mass index), pediatric, 5% to less than 85% for age Z76.54 V80.46    updated vaccines with flu vaccine today  Reviewed headache hygeine and trial of more tylenol than ibuprofen at this point  Discussed headache hygeine:  Keeping up with good fluids so that she is able to void 7-8 times/day minimum. Encouraged good sleep patterns--no screen time at least 1 hour prior to bedtime and regular meals with good protein to accompany her carb intake to avoid sugar drops.   Note for school absence offered as well as promotion of more fluids in the day to help with migraine prevention  Weight management: the patient and mother were counseled regarding nutrition and physical activity  The BMI follow up plan is as follows: nl BMI reviewed with family today. Counseling regarding the following: bicycle safety, , dental care, diet, firearm and poison safety, peer pressure, pool safety, school issues, seat belts and sleep. Follow up 1 year.     Lena Shaffer MD [facility]

## 2019-07-09 NOTE — ED PROVIDER NOTE - NSFOLLOWUPINSTRUCTIONS_ED_ALL_ED_FT
Stitches Removal    WHAT YOU NEED TO KNOW:    Stitches are usually removed within 14 days, depending on the location of the wound. Your healthcare provider will tell you when to return to have your stitches removed. Your provider will use sterile forceps or tweezers to  the knot of each stitch. He or she will cut the stitch with scissors and pull the stitch out. You may feel a slight tug as the stitch comes out.    DISCHARGE INSTRUCTIONS:    Return to the emergency department if:     Your wound splits open or is starting to come apart.      You suddenly cannot move your injured joint.      You have sudden numbness around your wound.      You see red streaks coming from your wound.    Contact your healthcare provider if:     You have a fever and chills.      Your wound is red, warm, swollen, or leaking pus.      There is a bad smell coming from your wound.      You have increased pain in the wound area.      You have questions or concerns about your condition or care.    Care for the area after the stitches are removed:     Do not pull medical tape off. Your provider may place small strips of medical tape across your wound after the stitches have been removed. These strips will peel and fall of on their own. Do not pull them off.      Clean the area as directed. Carefully wash the area with soap and water. Pat the area dry with a clean towel. Check the area for signs of infection, such as redness, swelling, or pus. Also check that the wound is not coming apart.      Protect your wound. Your wound can swell, bleed, or split open if it is stretched or bumped. You may need to wear a bandage that supports your wound until it is completely healed.      Care for a scar. You may have a scar after the stitches are removed. Use sunblock if the area is exposed to the sun. Apply it every day after the stitches are removed. This will help prevent skin discoloration. Talk to your healthcare provider about medicines you can use to make the scar less visible. Some medicines are available without a prescription.    Follow up with your healthcare provider as directed: You may need to return in 3 to 5 days if the stitches are on your face. Stitches on your scalp need to be removed after 7 to 14 days. Stitches over joints may remain in place up to 14 days. Write down your questions so you remember to ask them during your visits.

## 2019-07-13 DIAGNOSIS — S01.01XD LACERATION WITHOUT FOREIGN BODY OF SCALP, SUBSEQUENT ENCOUNTER: ICD-10-CM
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
## 2019-12-02 NOTE — PROGRESS NOTE ADULT - ASSESSMENT
Philip stated he will no longer have medication prescription and needs to know right away names of all he's medication currently taking?    51M w/ HTN, CAD/MI s/p angioplasty, EtOH abuse with atraumatic Grade II splenic hematoma.     NPO/IVF   Pain/Nausea Control PRN  Serial Abdominal Exams   Hold SQH/ASA   HTN control - cont diltiazem/lisinopril 51M w/ HTN, CAD/MI s/p angioplasty, EtOH abuse with atraumatic Grade II splenic hematoma.     NPO/IVF   Pain/Nausea Control PRN  Serial Abdominal Exams   Hold SQH/ASA   HTN control - cont diltiazem/lisinopril   OOB/A

## 2021-01-01 ENCOUNTER — EMERGENCY (EMERGENCY)
Facility: HOSPITAL | Age: 56
LOS: 1 days | Discharge: HOME | End: 2021-01-01
Attending: EMERGENCY MEDICINE | Admitting: EMERGENCY MEDICINE
Payer: MEDICARE

## 2021-01-01 VITALS
SYSTOLIC BLOOD PRESSURE: 148 MMHG | HEIGHT: 73 IN | DIASTOLIC BLOOD PRESSURE: 86 MMHG | RESPIRATION RATE: 15 BRPM | HEART RATE: 89 BPM | TEMPERATURE: 99 F | OXYGEN SATURATION: 98 %

## 2021-01-01 VITALS
TEMPERATURE: 98 F | HEART RATE: 97 BPM | RESPIRATION RATE: 15 BRPM | SYSTOLIC BLOOD PRESSURE: 113 MMHG | OXYGEN SATURATION: 98 % | DIASTOLIC BLOOD PRESSURE: 76 MMHG
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
## 2021-01-01 DIAGNOSIS — Z79.899 OTHER LONG TERM (CURRENT) DRUG THERAPY: ICD-10-CM

## 2021-01-01 DIAGNOSIS — I10 ESSENTIAL (PRIMARY) HYPERTENSION: ICD-10-CM
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
## 2021-01-01 DIAGNOSIS — S01.01XA LACERATION WITHOUT FOREIGN BODY OF SCALP, INITIAL ENCOUNTER: ICD-10-CM
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
## 2021-01-01 PROCEDURE — 72125 CT NECK SPINE W/O DYE: CPT

## 2021-01-01 PROCEDURE — 99284 EMERGENCY DEPT VISIT MOD MDM: CPT | Mod: 25

## 2021-01-01 PROCEDURE — 70450 CT HEAD/BRAIN W/O DYE: CPT | Mod: 26

## 2021-01-01 PROCEDURE — 12002 RPR S/N/AX/GEN/TRNK2.6-7.5CM: CPT

## 2021-01-01 PROCEDURE — 70450 CT HEAD/BRAIN W/O DYE: CPT

## 2021-01-01 PROCEDURE — 72125 CT NECK SPINE W/O DYE: CPT | Mod: 26

## 2021-01-01 RX ORDER — LIDOCAINE HCL 20 MG/ML
5 VIAL (ML) INJECTION ONCE
Refills: 0 | Status: COMPLETED | OUTPATIENT
Start: 2021-01-01 | End: 2021-01-01

## 2021-01-01 RX ADMIN — Medication 5 MILLILITER(S): at 21:47

## 2021-01-01 NOTE — ED PROVIDER NOTE - PHYSICAL EXAMINATION
CONST: nontoxic NAD speaking in full sentences  HEAD: atraumatic  EYES: conjunctivae clear, PERRL, EOMI, no racoon eyes  ENT: mmm, no sandoval sign, no clear rhinorrhea, no hemotympanum, no loose/avulsed dentition, tmj intact  NECK: supple/FROM, no midline ttp, no jvd  CARD: rrr no murmurs, no chest wall ttp/crepitus/deformity  CHEST: ctab no r/r/w, no stridor/retractions/tripoding  ABD: soft, nd, nttp, no rebound/guarding  PELVIS: stable, hips nttp, symmetric BLE, no pain w/ axial load/log roll  BACK: no midline ttp, no flank ecchymoses  EXT: FROM, symmetric distal pulses intact  SKIN: warm, dry, no rash, no pedal edema, cap refill <2sec  NEURO: a+ox3, CN II-XII intact, 5/5 strength x4, gross sensation intact x4, normal gait

## 2021-01-01 NOTE — ED ADULT NURSE NOTE - OBJECTIVE STATEMENT
received A&Ox3 55years old male pt with c/o of "I was walking in the rain and I fell." currently pt has a laceration on the top of the head in size of 5cm. pt denies blurry vision, LOC, dizziness, vomiting. PERRLA. pt talks in full sentences and walks in a stable gait. currently pt is not on blood thinner, no hx of stroke or brain bleed.

## 2021-01-01 NOTE — ED ADULT TRIAGE NOTE - ARRIVAL INFO ADDITIONAL COMMENTS
Patient reports that he slipped and fell because his boots were slippery after coming from buying pizza from [facility]. Patient denies near syncope or syncope prior and post event.

## 2021-01-01 NOTE — ED PROVIDER NOTE - PROGRESS NOTE DETAILS
clinically sober. asymptomatic. a+ox3. no acute focal neuro deficits. ambulates in straight line. full capacity requesting to go home. no e/o WD/DTs. will dc home.

## 2021-01-01 NOTE — ED PROVIDER NOTE - NSFOLLOWUPINSTRUCTIONS_ED_ALL_ED_FT
DO NOT WET WOUND X24HR, THEN MAY LET SOAP/WATER RUN OVER WOUND -- DO NOT SOAK/SCRUB. MAY APPLY BACITRACIN AND DRESSING TO WOUND AFTER SHOWERS.    PLEASE FOLLOW-UP WITH YOUR PCP 5 DAYS FOR SUTURE REMOVAL.    PLEASE RETURN TO THE EMERGENCY DEPARTMENT IF WOUND OPENS UP, REDNESS AROUND WOUND, PURULENT DRAINAGE, FEVER, SEVERE HEADACHE, VISION CHANGES, CHEST PAIN, SHORTNESS OF BREATH, ABDOMINAL PAIN, VOMITING, OTHER CONCERNING SYMPTOMS.

## 2021-01-01 NOTE — ED PROVIDER NOTE - PATIENT PORTAL LINK FT
You can access the FollowMyHealth Patient Portal offered by [facility] by registering at the following website: [email or web address]. By joining [employer]’s FollowMyHealth portal, you will also be able to view your health information using other applications (apps) compatible with our system.

## 2021-01-01 NOTE — ED PROVIDER NOTE - CLINICAL SUMMARY MEDICAL DECISION MAKING FREE TEXT BOX
avss. nontoxic. NAD. no systemic sx. no acute focal neuro deficits. no prodrome. no red flags. clinically sober. no e/o WD/DTs. given etoh-dpt, will do ct. ct head/neck w/o acute abnl. tetanus utd. s/p successful uncomplicated lac repair. no indication for inpatient hospitalization at this time. will dc w/ outpatient pcp fu in 5d for suture removal, wound care instructions, strict return precautions. pt agrees w/ plan. questions answered.

## 2021-01-01 NOTE — ED PROVIDER NOTE - OBJECTIVE STATEMENT
55M smoker, etoh-dpt (daily beer, last drink this afternoon), cad s/p angioplasty, htn, hld, splenic hemorrhage and subcapsular collection, eoth-induced pancreatitis involving tail (6/2019), c/o L posterior scalp lac s/p groundlevel mechanical fall backwards after slipping on wet sidewalk just pta. +mild dizziness s/p fall, now resolved. pt got self up and ambulated home before finally calling ems. no loc, no neck pain, no n/v, no seizure-like activity, no postictal, no prodrome. no antiplatelet/AC. last tetanus 6/2019. no fever/chills, no uri/cough, no cp/sob, no abd pain/n/v, no diarrhea, no hematochezia/melena, no dysuria, no pedal edema/pain/rash, no ivdu. 55M smoker, etoh-dpt (daily beer, last drink this afternoon), cad s/p angioplasty, htn, hld, splenic hemorrhage and subcapsular collection, etoh-induced pancreatitis involving tail (6/2019), c/o L posterior scalp lac s/p groundlevel mechanical fall backwards after slipping on wet sidewalk just pta. +mild dizziness s/p fall, now resolved. pt got self up and ambulated home before finally calling ems. no loc, no neck pain, no n/v, no seizure-like activity, no postictal, no prodrome. no antiplatelet/AC. last tetanus 6/2019. no fever/chills, no uri/cough, no cp/sob, no abd pain/n/v, no diarrhea, no hematochezia/melena, no dysuria, no pedal edema/pain/rash, no ivdu.

## 2021-01-06 ENCOUNTER — EMERGENCY (EMERGENCY)
Facility: HOSPITAL | Age: 56
LOS: 1 days | Discharge: HOME | End: 2021-01-06
Admitting: EMERGENCY MEDICINE
Payer: MEDICARE

## 2021-01-06 VITALS
DIASTOLIC BLOOD PRESSURE: 92 MMHG | HEART RATE: 97 BPM | TEMPERATURE: 99 F | RESPIRATION RATE: 18 BRPM | SYSTOLIC BLOOD PRESSURE: 156 MMHG | OXYGEN SATURATION: 97 %

## 2021-01-06 VITALS
RESPIRATION RATE: 18 BRPM | DIASTOLIC BLOOD PRESSURE: 93 MMHG | SYSTOLIC BLOOD PRESSURE: 153 MMHG | TEMPERATURE: 98 F | HEART RATE: 106 BPM | HEIGHT: 73 IN | WEIGHT: 184.97 LBS | OXYGEN SATURATION: 97 %

## 2021-01-06 DIAGNOSIS — Z48.02 ENCOUNTER FOR REMOVAL OF SUTURES: ICD-10-CM
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
## 2021-01-06 DIAGNOSIS — S01.01XD LACERATION WITHOUT FOREIGN BODY OF SCALP, SUBSEQUENT ENCOUNTER: ICD-10-CM

## 2021-01-06 PROCEDURE — 99212 OFFICE O/P EST SF 10 MIN: CPT

## 2021-01-06 PROCEDURE — L9995: CPT

## 2021-01-06 NOTE — ED PROVIDER NOTE - PHYSICAL EXAMINATION
CONSTITUTIONAL: Well-appearing; well-nourished; in no apparent distress.   HEAD: Normocephalic; well healed lac to L parietoccipital scalp   EYES: PERRL; EOM intact; conjunctiva and sclera clear  ENT: normal nose; no rhinorrhea; normal pharynx with no erythema or lesions.   NECK: Supple; non-tender;   MSK: FROM at all extremities, normal tone   EXT: No cyanosis or edema; N/V intact  SKIN: Normal for age and race; warm; dry; good turgor; no apparent lesions or rash.

## 2021-01-06 NOTE — ED PROVIDER NOTE - OBJECTIVE STATEMENT
56 yo m here for suture removal. Sutures placed to occipital scalp 5 days ago. Admits to feeling well. Denies ha, dizziness, n/v, fever, chills.

## 2021-01-06 NOTE — ED PROVIDER NOTE - PATIENT PORTAL LINK FT
You can access the FollowMyHealth Patient Portal offered by [facility] by registering at the following website: [email or web address]. By joining [employer]’s FollowMyHealth portal, you will also be able to view your health information using other applications (apps) compatible with our system.

## 2021-01-06 NOTE — ED ADULT NURSE NOTE - CHPI ED NUR SYMPTOMS NEG
no bleeding at site/no chills/no drainage/no fever/no inflammation/no pain/no purulent drainage/no redness

## 2021-01-06 NOTE — ED PROVIDER NOTE - NSFOLLOWUPINSTRUCTIONS_ED_ALL_ED_FT
Stitches Removal    WHAT YOU NEED TO KNOW:    Stitches are usually removed within 14 days, depending on the location of the wound. Your healthcare provider will tell you when to return to have your stitches removed. Your provider will use sterile forceps or tweezers to  the knot of each stitch. He or she will cut the stitch with scissors and pull the stitch out. You may feel a slight tug as the stitch comes out.    DISCHARGE INSTRUCTIONS:    Return to the emergency department if:   •Your wound splits open or is starting to come apart.      •You suddenly cannot move your injured joint.      •You have sudden numbness around your wound.      •You see red streaks coming from your wound.      Contact your healthcare provider if:   •You have a fever and chills.      •Your wound is red, warm, swollen, or leaking pus.      •There is a bad smell coming from your wound.      •You have increased pain in the wound area.      •You have questions or concerns about your condition or care.      Care for the area after the stitches are removed:   •Do not pull medical tape off. Your provider may place small strips of medical tape across your wound after the stitches have been removed. These strips will peel and fall of on their own. Do not pull them off.      •Clean the area as directed. Carefully wash the area with soap and water. Pat the area dry with a clean towel. Check the area for signs of infection, such as redness, swelling, or pus. Also check that the wound is not coming apart.      •Protect your wound. Your wound can swell, bleed, or split open if it is stretched or bumped. You may need to wear a bandage that supports your wound until it is completely healed.      •Care for a scar. You may have a scar after the stitches are removed. Use sunblock if the area is exposed to the sun. Apply it every day after the stitches are removed. This will help prevent skin discoloration. Talk to your healthcare provider about medicines you can use to make the scar less visible. Some medicines are available without a prescription.      Follow up with your healthcare provider as directed: You may need to return in 3 to 5 days if the stitches are on your face. Stitches on your scalp need to be removed after 7 to 14 days. Stitches over joints may remain in place up to 14 days. Write down your questions so you remember to ask them during your visits.

## 2021-01-06 NOTE — ED PROVIDER NOTE - PSH
Status post percutaneous transluminal coronary angioplasty  S/P angioplasty   Eloped (saw a physician/midlevel provider but left without telling anyone)

## 2021-01-06 NOTE — ED ADULT NURSE NOTE - OBJECTIVE STATEMENT
PT AOX4. Pt presents to ED for suture removal. Pt presents with stitches to left parietal region of head. No drainage, redness, pain, bruising noted from site. Dried blood present on stitches but no active bleeding noted. Pt denies fevers, chills, n/v, SOB, chest pain. Pt speaking in full complete sentences. Respirations even and unlabored.

## 2021-01-06 NOTE — ED PROVIDER NOTE - CLINICAL SUMMARY MEDICAL DECISION MAKING FREE TEXT BOX
56 yo m here for suture removal. Sutures placed to occipital scalp 5 days ago. Admits to feeling well. Denies ha, dizziness, n/v, fever, chills. well healed lac to L parietoccipital scalp, sutures removed.

## 2021-03-31 NOTE — ED PROVIDER NOTE - NS ED MD DISPO ADMIT [facility]
Patient is calling stating she would like Tamara Moore to give her a call to discuss the diet she is currently on.    Patient Name: Ambar Hargrove  Caller Name: Patient  Callback Number: [phone]  Did you confirm the message with the caller?: yes    Thank you,  Brooke Sood     SURGERY

## 2021-11-24 NOTE — H&P ADULT - NSHPROSALLOTHERNEGRD_GEN_ALL_CORE
All other review of systems negative, except as noted in HPI Mucosal Advancement Flap Text: Given the location of the defect, shape of the defect and the proximity to free margins a mucosal advancement flap was deemed most appropriate. Incisions were made with a 15 blade scalpel in the appropriate fashion along the cutaneous vermilion border and the mucosal lip. The remaining actinically damaged mucosal tissue was excised.  The mucosal advancement flap was then elevated to the gingival sulcus with care taken to preserve the neurovascular structures and advanced into the primary defect. Care was taken to ensure that precise realignment of the vermilion border was achieved.

## 2022-02-04 NOTE — ED PROVIDER NOTE - RESPIRATORY, MLM
+decreased breath sounds L base. Normal work of breathing. No respiratory distress. Topical Clindamycin Pregnancy And Lactation Text: This medication is Pregnancy Category B and is considered safe during pregnancy. It is unknown if it is excreted in breast milk.

## 2022-04-07 NOTE — PROGRESS NOTE ADULT - ASSESSMENT
50 y/o M with chronic pancreatitis CAD, MI s/p PCI, now with mass in distal pancreas    Soft mechanical diet  DVT ppx  IS  OOBA  AM labs  MRI  OR tuesday for distal pancreatectomy Skyrizi Counseling: I discussed with the patient the risks of risankizumab-rzaa including but not limited to immunosuppression, and serious infections.  The patient understands that monitoring is required including a PPD at baseline and must alert us or the primary physician if symptoms of infection or other concerning signs are noted.

## 2022-06-23 NOTE — DISCHARGE NOTE PROVIDER - CARE PROVIDER_API CALL
Memo Rios)  Surgery  [address] [address]  Phone: [phone]  Fax: [phone]  Follow Up Time:
done

## 2022-08-20 NOTE — DISCHARGE NOTE ADULT - PATIENT PORTAL LINK FT
“You can access the FollowHealth Patient Portal, offered by [facility], by registering with the following website: [email or web address]” Parent(s)

## 2022-09-23 NOTE — PATIENT PROFILE ADULT. - MEDICATION HERBAL REMEDIES, PROFILE
Patient : Nahun Nicholas Age: 12 year old Sex: male   MRN: [id number] Encounter Date: 9/22/2022      History     Chief Complaint   Patient presents with   • Cough     HPI  Nahun Nicholas is a 12 year old male who presents to the ED for evaluation of cough.  Pt has had a cough for about 3 weeks which worsened over the past week.  He is coughing up sputum.  He has rhinorrhea as well.  No fevers.  No shortness of breath.  Mom has been trying OTC medication but states pt continues to get worse.  She states he has had bronchitis several times in the past and thinks he needs antibiotics given this has not improved with conservative treatment for 3 weeks.        No Known Allergies    Discharge Medication List as of 9/22/2022  9:05 PM      New Prescriptions    Details   azithromycin (ZITHROMAX) 250 MG tablet Take 1 tablet by mouth daily for 5 days. Take 2 tablets (500 mg) day 1, then 1 tablet (250 mg) day 2-5.Eprescribe, Disp-6 tablet, R-0             No past medical history on file.    No past surgical history on file.    No family history on file.         No existing history information found.  No existing history information found.    Review of Systems   Constitutional: Negative for chills and fever.   HENT: Positive for rhinorrhea. Negative for congestion and sore throat.    Eyes: Negative for visual disturbance.   Respiratory: Positive for cough. Negative for shortness of breath.    Cardiovascular: Negative for chest pain.   Gastrointestinal: Negative for abdominal pain, blood in stool, diarrhea, nausea and vomiting.   Genitourinary: Negative for dysuria and frequency.   Musculoskeletal: Negative for myalgias.   Skin: Negative for rash.   Neurological: Negative for dizziness and weakness.   Psychiatric/Behavioral: Negative for confusion.       Physical Exam     ED Triage Vitals [09/22/22 2037]   ED Triage Vitals Group      Temp 98.5 °F (36.9 °C)      Heart Rate 83      Resp 18      BP (!) 124/58      SpO2 99 %       EtCO2 mmHg       Height       Weight 138 lb 9.6 oz (62.9 kg)      Weight Scale Used ED Stated      BMI (Calculated)       IBW/kg (Calculated)        Physical Exam  Vitals and nursing note reviewed.   Constitutional:       General: He is not in acute distress.     Appearance: He is well-developed.   HENT:      Head: Normocephalic and atraumatic.      Right Ear: Tympanic membrane and external ear normal.      Left Ear: Tympanic membrane and external ear normal.      Nose: Rhinorrhea present.      Mouth/Throat:      Mouth: Mucous membranes are moist.   Eyes:      Extraocular Movements: Extraocular movements intact.      Conjunctiva/sclera: Conjunctivae normal.   Cardiovascular:      Rate and Rhythm: Normal rate and regular rhythm.      Heart sounds: Normal heart sounds.   Pulmonary:      Effort: Pulmonary effort is normal. No respiratory distress.      Breath sounds: Normal breath sounds. No wheezing or rales.   Abdominal:      General: There is no distension.      Palpations: Abdomen is soft.      Tenderness: There is no abdominal tenderness. There is no guarding or rebound.   Musculoskeletal:         General: No tenderness. Normal range of motion.      Cervical back: Normal range of motion and neck supple.   Skin:     General: Skin is warm and dry.   Neurological:      Mental Status: He is alert and oriented to person, place, and time.      Comments: No gross motor or sensory deficits         ED Course     Procedures    Lab Results     No results found for this visit on 09/22/22.      Radiology Results     Imaging Results    None         ED Medication Orders (From admission, onward)    None               MDM   12-year-old male presenting to the ED for evaluation of cough.  Patient arrives with unremarkable vitals and in no acute distress.  I suspect this is a viral illness given no source of bacterial infection on exam and he has been afebrile.  However, mom is concerned that she has tried conservative management for  3 weeks and it has not improved so is quite insistent he gets antibiotics.  I feel this is reasonable since they have tried the \"wait and see\" approach without improvement.  Will prescribe a Z-Amando and have him follow-up with his PCP.  Red flags and return precautions discussed with patient who verbalized understanding and agrees with plan.      Clinical Impression     ED Diagnosis   1. Bronchitis         Disposition        Discharge 9/22/2022  8:55 PM  Nahun Nicholas discharge to home/self care.                         Tai Rivera MD  09/22/22 5240     no

## 2022-10-31 NOTE — ED ADULT TRIAGE NOTE - MEANS OF ARRIVAL
Per email from [employer], patient navigation team will reach out to pt and daughter to explain how billing process works and any financial aid needed.   ambulatory

## 2022-12-17 NOTE — PATIENT PROFILE ADULT. - BLOOD TRANSFUSION, PREVIOUS, PROFILE
[FreeTextEntry1] : Recent blood work has been reviewed.  \par Reviewed clinical red flags which would warrant further evaluation and management.\par Reviewed the importance of fall precautions. \par Increase aerobic exercise. \par \par Valvular heart disease status post TAVR 2017, elevated gradient, mild paravalular, nl EF\par declines echo\par SBE prophylaxis\par \par CAD, nonobsx cath 2017\par No angina at present, limited functional status. \par declines stress testing. \par Cont preventive medical therapy including statin and diet/lifestyle changes. \par \par HL\par Atherosclerosis, moderate\par No AAA\par Mild abd/carotid atherosclerosis, nonobstructive\par Continue statin therapy \par \par Atrial fibrillation\par Recommended monitoring to r/o underlying conduction disease given TAVR hx. declines\par Continue OAC and BB, monitor hgb and renal fns. \par Reviewed bleeding precautions. \par \par HTN, improved \par Adjunctive lifestyle measures advised \par Low-sodium diet \par Monitor thyroid replacement. \par \par Discussed red flag symptoms that would warrant immediate intervention. All patient questions and concerns have been addressed. Instructed to call the office if any new symptoms. \par  no

## 2022-12-28 NOTE — H&P ADULT - NSTOBACCOSCREENHP_GEN_A_NCS
No Initiate Treatment: Advised patient to use Clindamycin on face BID for 1 month. Advised patient to use tretinoin cream QHS for 1 month. Render In Strict Bullet Format?: No Detail Level: Detailed

## 2023-01-01 NOTE — DISCHARGE NOTE ADULT - HOSPITAL COURSE
51M history of CAD, HTN, prior EtOH abuse, segmental PE, and previous pancreatitis presents with CC of fever. Was admitted in January for nonruptured hematoma, s/p NICOLE drain. Fever came in setting of two episodes of diarrhea, which resolved spontaneously. CT abdomen revealed interval enlargement of splenic hematoma - patient hemodynamically stable. IR was consulted, no plans for intervention as there are no signs of active bleed. Surgery concurred: no plans for intervention. Heme/onc saw patient: recommending hypercoagulability workup as outpatient. Went for CT PE to determine if safe to discontinue anticoagulation. In absence of PE, deemed safe to stop coumadin. Accordingly, patient stable for DC home with outpatient follow up with Dr. Obregon in one week. 51M history of CAD, HTN, prior EtOH abuse, segmental PE, and previous pancreatitis presents with CC of fever. Was admitted in January for nonruptured hematoma, s/p NICOLE drain. Fever came in setting of two episodes of diarrhea, which resolved spontaneously. CT abdomen revealed interval enlargement of splenic hematoma - patient hemodynamically stable. IR was consulted, no plans for intervention as there are no signs of active bleed. Surgery concurred: no plans for intervention. Heme/onc saw patient: recommending hypercoagulability workup as outpatient. Went for CT PE to determine if safe to discontinue anticoagulation. In absence of PE, deemed safe to stop coumadin. Accordingly, patient stable for DC home with outpatient follow up with Dr. Obregon in one week. 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 No acute intervention needed. Normal vision: sees adequately in most situations; can see medication labels, newsprint 51M history of CAD, HTN, prior EtOH abuse, segmental PE, and previous pancreatitis presents with CC of fever. Was admitted in January for nonruptured hematoma, s/p NICOLE drain. Fever came in setting of two episodes of diarrhea, which resolved spontaneously. CT abdomen revealed interval enlargement of splenic hematoma - patient hemodynamically stable. IR was consulted, no plans for intervention as there are no signs of active bleed. Surgery concurred: no plans for intervention. Heme/onc saw patient: recommending hypercoagulability workup as outpatient. Went for CT PE to determine if safe to discontinue anticoagulation. In absence of PE, deemed safe to stop coumadin. Accordingly, patient stable for DC home with outpatient follow up with Dr. Obregon in one week. 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 No acute intervention needed. I informed the patient that he needs to have another echo to evaluate the aneurysm over time.     On the day of discharge, the patient was seen and examined. Symptoms improved. Vital signs are stable. Labs and imaging reviewed. Patient is medically optimized and hemodynamically stable. Return precautions discussed, medication teach back done, and importance of physician followup. The patient verbalized understanding	. 51M history of CAD, HTN, prior EtOH abuse, segmental PE, and previous pancreatitis presents with CC of fever. Was admitted in January for nonruptured hematoma, s/p NICOLE drain. Fever came in setting of two episodes of diarrhea, which resolved spontaneously. CT abdomen revealed interval enlargement of splenic hematoma - patient hemodynamically stable. IR was consulted, no plans for intervention as there are no signs of active bleed. Surgery concurred: no plans for intervention. Heme/onc saw patient: recommending hypercoagulability workup as outpatient. Went for CT PE to determine if safe to discontinue anticoagulation. In absence of PE, deemed safe to stop coumadin. Accordingly, patient stable for DC home with outpatient follow up with Dr. Obregon in one week. 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 No acute intervention needed. I informed the patient that he needs to have another echo to evaluate the aneurysm over time. He will inform his outpatient cardiologist.     On the day of discharge, the patient was seen and examined. Symptoms improved. Vital signs are stable. Labs and imaging reviewed. Patient is medically optimized and hemodynamically stable. Return precautions discussed, medication teach back done, and importance of physician followup. The patient verbalized understanding	.

## 2023-02-13 ENCOUNTER — EMERGENCY (EMERGENCY)
Facility: HOSPITAL | Age: 58
LOS: 1 days | Discharge: HOME | End: 2023-02-13
Attending: STUDENT IN AN ORGANIZED HEALTH CARE EDUCATION/TRAINING PROGRAM | Admitting: STUDENT IN AN ORGANIZED HEALTH CARE EDUCATION/TRAINING PROGRAM
Payer: MEDICARE

## 2023-02-13 VITALS
RESPIRATION RATE: 18 BRPM | DIASTOLIC BLOOD PRESSURE: 101 MMHG | SYSTOLIC BLOOD PRESSURE: 151 MMHG | WEIGHT: 179.9 LBS | TEMPERATURE: 98 F | HEART RATE: 107 BPM | OXYGEN SATURATION: 97 % | HEIGHT: 73 IN

## 2023-02-13 VITALS
TEMPERATURE: 98 F | HEART RATE: 98 BPM | DIASTOLIC BLOOD PRESSURE: 98 MMHG | OXYGEN SATURATION: 98 % | RESPIRATION RATE: 18 BRPM | SYSTOLIC BLOOD PRESSURE: 150 MMHG

## 2023-02-13 PROCEDURE — 99283 EMERGENCY DEPT VISIT LOW MDM: CPT

## 2023-02-13 NOTE — ED PROVIDER NOTE - NSFOLLOWUPCLINICS_GEN_ALL_ED_FT
[facility]  Otolaryngology (ENT)  [address], [address]  [place] [address]  Phone: [phone]  Fax:   Follow Up Time: Routine    [facility]  Otol[facility] (ENT)  [address] [address] [place] [address]  Phone: [phone]  Fax:   Follow Up Time: Routine

## 2023-02-13 NOTE — ED PROVIDER NOTE - OBJECTIVE STATEMENT
57m hx cad, etoh abuse, gerd presenting for throat/swallowing discomfort. for the past 7-9 days patient intermittently feels discomfort in his throat/has mild difficulty swallowing. sometimes he has no discomfort and is able to swallow without any issue. better when he drinks alcohol. no symptoms of withdrawal. no vomit. no bloody stool. no trauma. no fever

## 2023-02-13 NOTE — ED ADULT NURSE NOTE - OBJECTIVE STATEMENT
Pt presents to ED with difficulty swallowing x 7-9 days. Reports "lump" in throat that "comes and goes." Pt reports it causes mild difficulty swallowing. Airway intact, breathing unlabored on room air. Hx etoh abuse. Denies shortness of breath, withdrawal symptoms, chest pain, fevers/chills.

## 2023-02-13 NOTE — ED PROVIDER NOTE - PATIENT PORTAL LINK FT
You can access the FollowMyHealth Patient Portal offered by [facility] by registering at the following website: [email or web address]. By joining [employer]’s FollowMyHealth portal, you will also be able to view your health information using other applications (apps) compatible with our system.

## 2023-02-13 NOTE — ED ADULT NURSE NOTE - NSICDXPASTSURGICALHX_GEN_ALL_CORE_FT
PAST SURGICAL HISTORY:  Status post percutaneous transluminal coronary angioplasty S/P angioplasty

## 2023-02-13 NOTE — ED PROVIDER NOTE - PHYSICAL EXAMINATION
General: Awake, alert and oriented. No acute distress. Well developed, hydrated and nourished. Appears stated age.   Skin: Skin in warm, dry and intact without rashes or lesions. Appropriate color for ethnicity  HENMT: head normocephalic and atraumatic; bilateral external ears without swelling. no nasal discharge. moist oral mucosa. supple neck, trachea midline, no posterior orophryngeal swelling or exudate or erythema. no neck masses or ttp  EYES: Conjunctiva clear. nonicteric sclera. EOM intact, Eyelids are normal in appearance without swelling or lesions.  Cardiac: well perfused  Respiratory: breathing comfortably on room air. no audible wheezing or stridor  Abdominal: nondistended  MSK: Neck and back are without deformity, visible external skin changes, or signs of trauma. Curvature of the cervical, thoracic, and lumbar spine are within normal limits. no external signs of trauma. no apparent deficits in ROM of any extremity  Neurological: The patient is awake, alert and oriented to person, place, and time with normal speech. CN 2-12 grossly intact. no apparent deficits. Memory is normal and thought process is intact. No gait abnormalities are appreciated.   Psychiatric: Appropriate mood and affect. Good judgement and insight

## 2023-02-13 NOTE — ED PROVIDER NOTE - CLINICAL SUMMARY MEDICAL DECISION MAKING FREE TEXT BOX
no issues breathing. currently asymptomatic. no signs of foreign body or infection. no signs of vsricel bleed. unclear cause of symptoms, possible worsening of gerd?. no indication for further ed workup, stbale for dc with ent follow up

## 2023-02-16 ENCOUNTER — APPOINTMENT (OUTPATIENT)
Dept: OTOLARYNGOLOGY | Facility: CLINIC | Age: 58
End: 2023-02-16

## 2023-02-16 DIAGNOSIS — R13.10 DYSPHAGIA, UNSPECIFIED: ICD-10-CM

## 2023-02-16 DIAGNOSIS — K21.9 GASTRO-ESOPHAGEAL REFLUX DISEASE WITHOUT ESOPHAGITIS: ICD-10-CM

## 2023-02-16 DIAGNOSIS — I25.10 ATHEROSCLEROTIC HEART DISEASE OF NATIVE CORONARY ARTERY WITHOUT ANGINA PECTORIS: ICD-10-CM

## 2023-02-16 DIAGNOSIS — F17.200 NICOTINE DEPENDENCE, UNSPECIFIED, UNCOMPLICATED: ICD-10-CM

## 2023-02-16 DIAGNOSIS — I10 ESSENTIAL (PRIMARY) HYPERTENSION: ICD-10-CM

## 2023-02-16 DIAGNOSIS — R07.0 PAIN IN THROAT: ICD-10-CM

## 2023-02-16 DIAGNOSIS — Z86.59 PERSONAL HISTORY OF OTHER MENTAL AND BEHAVIORAL DISORDERS: ICD-10-CM

## 2023-02-16 DIAGNOSIS — Z87.19 PERSONAL HISTORY OF OTHER DISEASES OF THE DIGESTIVE SYSTEM: ICD-10-CM

## 2023-02-28 NOTE — ED PROVIDER NOTE - DATA REVIEWED, MDM
vital signs Adjacent Tissue Transfer Text: The defect edges were debeveled with a #15 scalpel blade.  Given the location of the defect and the proximity to free margins an adjacent tissue transfer was deemed most appropriate.  Using a sterile surgical marker, an appropriate flap was drawn incorporating the defect and placing the expected incisions within the relaxed skin tension lines where possible.    The area thus outlined was incised deep to adipose tissue with a #15 scalpel blade.  The skin margins were undermined to an appropriate distance in all directions utilizing iris scissors.

## 2023-03-27 NOTE — ED ADULT TRIAGE NOTE - PRO INTERPRETER NEED 2
2L of sanginous pleural fluid drained from left side under CT guidance.  No immediate complication.  Will get Chest Xray in 60 min, if no complications, OK to d/c home.   English

## 2023-04-11 NOTE — ED ADULT TRIAGE NOTE - BSA (M2)
[de-identified] : Patient is WDWN, alert, and in no acute distress. Breathing is unlabored. She is grossly oriented to person, place, and time.\par \par She is accompanied by her  today.\par \par Left Hand (Ring Finger):\par There is a small mass noted to the DIP joint, radially.\par She has full flexion and extension at the DIP joint of the left ring finger.\par Full PIP and MCP joint motion.\par Full digital motion otherwise.\par Sensation is intact to the digits distally.\par  [de-identified] : AP, lateral and oblique views of the LEFT hand were obtained today and revealed no abnormalities. No acute fracture. No dislocation. Cartilage spaces are maintained.  2.08

## 2023-05-08 ENCOUNTER — EMERGENCY (EMERGENCY)
Facility: HOSPITAL | Age: 58
LOS: 1 days | Discharge: HOME | End: 2023-05-08
Admitting: EMERGENCY MEDICINE
Payer: MEDICARE

## 2023-05-08 VITALS
DIASTOLIC BLOOD PRESSURE: 87 MMHG | SYSTOLIC BLOOD PRESSURE: 135 MMHG | HEART RATE: 87 BPM | TEMPERATURE: 98 F | OXYGEN SATURATION: 100 % | RESPIRATION RATE: 17 BRPM

## 2023-05-08 VITALS
HEART RATE: 98 BPM | SYSTOLIC BLOOD PRESSURE: 147 MMHG | TEMPERATURE: 98 F | HEIGHT: 73 IN | OXYGEN SATURATION: 96 % | DIASTOLIC BLOOD PRESSURE: 95 MMHG | RESPIRATION RATE: 17 BRPM | WEIGHT: 184.97 LBS

## 2023-05-08 DIAGNOSIS — I25.10 ATHEROSCLEROTIC HEART DISEASE OF NATIVE CORONARY ARTERY WITHOUT ANGINA PECTORIS: ICD-10-CM

## 2023-05-08 DIAGNOSIS — Z98.61 CORONARY ANGIOPLASTY STATUS: ICD-10-CM

## 2023-05-08 DIAGNOSIS — Z87.19 PERSONAL HISTORY OF OTHER DISEASES OF THE DIGESTIVE SYSTEM: ICD-10-CM

## 2023-05-08 DIAGNOSIS — I10 ESSENTIAL (PRIMARY) HYPERTENSION: ICD-10-CM

## 2023-05-08 DIAGNOSIS — M25.562 PAIN IN LEFT KNEE: ICD-10-CM

## 2023-05-08 DIAGNOSIS — M54.50 LOW BACK PAIN, UNSPECIFIED: ICD-10-CM

## 2023-05-08 DIAGNOSIS — Z87.891 PERSONAL HISTORY OF NICOTINE DEPENDENCE: ICD-10-CM

## 2023-05-08 DIAGNOSIS — R51.9 HEADACHE, UNSPECIFIED: ICD-10-CM
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
## 2023-05-08 DIAGNOSIS — M25.552 PAIN IN LEFT HIP: ICD-10-CM
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
## 2023-05-08 PROCEDURE — 99284 EMERGENCY DEPT VISIT MOD MDM: CPT | Mod: 25

## 2023-05-08 PROCEDURE — 96372 THER/PROPH/DIAG INJ SC/IM: CPT

## 2023-05-08 PROCEDURE — 73502 X-RAY EXAM HIP UNI 2-3 VIEWS: CPT

## 2023-05-08 PROCEDURE — 73564 X-RAY EXAM KNEE 4 OR MORE: CPT

## 2023-05-08 PROCEDURE — 73502 X-RAY EXAM HIP UNI 2-3 VIEWS: CPT | Mod: 26,LT

## 2023-05-08 PROCEDURE — 72100 X-RAY EXAM L-S SPINE 2/3 VWS: CPT

## 2023-05-08 PROCEDURE — 99284 EMERGENCY DEPT VISIT MOD MDM: CPT

## 2023-05-08 PROCEDURE — 73564 X-RAY EXAM KNEE 4 OR MORE: CPT | Mod: 26,RT

## 2023-05-08 PROCEDURE — 72100 X-RAY EXAM L-S SPINE 2/3 VWS: CPT | Mod: 26

## 2023-05-08 RX ORDER — KETOROLAC TROMETHAMINE 30 MG/ML
15 SYRINGE (ML) INJECTION ONCE
Refills: 0 | Status: DISCONTINUED | OUTPATIENT
Start: 2023-05-08 | End: 2023-05-08

## 2023-05-08 RX ORDER — LIDOCAINE 4 G/100G
1 CREAM TOPICAL ONCE
Refills: 0 | Status: COMPLETED | OUTPATIENT
Start: 2023-05-08 | End: 2023-05-08

## 2023-05-08 RX ADMIN — LIDOCAINE 1 PATCH: 4 CREAM TOPICAL at 14:09

## 2023-05-08 RX ADMIN — Medication 15 MILLIGRAM(S): at 14:09

## 2023-05-08 NOTE — ED PROVIDER NOTE - OBJECTIVE STATEMENT
57 M pmh etoh abuse, CAD p/w low back pain, L hip pain and L knee pain s/p mechanical fall.  pt reports 9 days ago sustained trip and fall landing on buttock.  afterwards pt was in detox and reports had xrays but unsure of results.  after pt left rehab he slipped and fell in his kitchen landing on R knee 2 days ago.  since has had pain in low back, L hip and R knee but able to ambulate.  taking advil w/ some relief, no meds today.  last drink last night.  Denies fever, numbness, weakness, difficulty walking, bowel/bladder dysfunction, dysuria, hematuria, saddle anesthesia, h/o CA, h/o IVDA

## 2023-05-08 NOTE — ED PROVIDER NOTE - PATIENT PORTAL LINK FT
You can access the FollowMyHealth Patient Portal offered by [facility] by registering at the following website: [email or web address]. By joining [employer]’s FollowMyHealth portal, you will also be able to view your health information using other applications (apps) compatible with our system.

## 2023-05-08 NOTE — ED ADULT NURSE NOTE - OBJECTIVE STATEMENT
57M PMH ETOH abuse presents c/o lower back pain s/p mechanical fall out of the shower 10 days ago. denies head injury/LOC, loss of bowel/bladder or numbness/tingling/weakness. pt states he went to detox the day after the fall and was given a cane for assistance with ambulation. pt presents today c/o persistent pain. last drink was last night. pt usually drinks about a 6 pack and a shot. denies hx of ETOH seizures. pt speaking in clear, complete sentences. RR easy, even, un-labored on room air

## 2023-05-08 NOTE — ED ADULT NURSE NOTE - NSIMPLEMENTINTERV_GEN_ALL_ED
Implemented All Fall Risk Interventions:  [place] to call system. Call bell, personal items and telephone within reach. Instruct patient to call for assistance. Room bathroom lighting operational. Non-slip footwear when patient is off stretcher. Physically safe environment: no spills, clutter or unnecessary equipment. Stretcher in lowest position, wheels locked, appropriate side rails in place. Provide visual cue, wrist band, yellow gown, etc. Monitor gait and stability. Monitor for mental status changes and reorient to person, place, and time. Review medications for side effects contributing to fall risk. Reinforce activity limits and safety measures with patient and family.

## 2023-05-08 NOTE — ED PROVIDER NOTE - PHYSICAL EXAMINATION
Vitals reviewed  Gen: comfortable appearing at rest, in nad, speaking in full sentences  Skin: wwp, no rash/lesions  HEENT: ncat, eomi, mmm  Back: no midline ttp/step off, L lumbar paraspinal ttp, neg SLR, ROM not limited    CV: rrr, no audible m/r/g  Resp: symmetrical expansion, ctab, no w/r/r  Ext: minimal ttp over R inferior patella and L posterior hip, FROM throughout, no peripheral edema, 5/5 strength all ext, SILT equal throughout, distal pulses 2+  Neuro: alert/oriented, no focal deficits, steady gait w/ cane

## 2023-05-08 NOTE — ED PROVIDER NOTE - NSFOLLOWUPINSTRUCTIONS_ED_ALL_ED_FT
Take tylenol 650mg or motrin 400-800mg as needed every 4-6 hours for pain.   REST- Rest your hurting/injured joint or extremity to decrease pain and swelling for 24-48 hours    ICE- Apply ice to area of pain to decreased inflammation and pain, put towel/barrier between ice and skin. You can keep ice on for 20 minutes at a time 4-8 times daily   COMPRESSION- Wear ace wrap or brace for support to reduce swelling.  Make sure not to wrap too tight, loosen if skin feeling numb/tingling or skin turns blue   ELEVATION- Elevate hurting/injured area 6 or more inches about level of heart to decrease swelling/inflammation.  Use pillow under joint to elevate area    [facility] (Thursday afternoons) - call [phone] to schedule    Back Pain    Back pain is very common in adults. The cause of back pain is rarely dangerous and the pain often gets better over time. The cause of your back pain may not be known and may include strain of muscles or ligaments, degeneration of the spinal disks, or arthritis. Occasionally the pain may radiate down your leg(s). Over-the-counter medicines to reduce pain and inflammation are often the most helpful. Stretching and remaining active frequently helps the healing process.     SEEK IMMEDIATE MEDICAL CARE IF YOU HAVE ANY OF THE FOLLOWING SYMPTOMS: bowel or bladder control problems, unusual weakness or numbness in your arms or legs, nausea or vomiting, abdominal pain, fever, dizziness/lightheadedness.

## 2023-05-08 NOTE — ED PROVIDER NOTE - CLINICAL SUMMARY MEDICAL DECISION MAKING FREE TEXT BOX
57 M pmh etoh abuse, CAD p/w low back pain, L hip pain and L knee pain s/p mechanical fall 9 days ago and 2 days ago.  on exam no midline spinal ttp, + ttp over L lower back and L posterior hip and R inferior patella, NVI and able to ambulate w/ cane.  xrays LS/L hip and R knee show no acute fx or dislocation.  given NSAID/lido patch, continue RICE and f/u with outpt ortho.  discussed strict return parameters

## 2023-05-11 ENCOUNTER — APPOINTMENT (OUTPATIENT)
Dept: ORTHOPEDIC SURGERY | Facility: CLINIC | Age: 58
End: 2023-05-11

## 2023-05-11 VITALS
BODY MASS INDEX: 24.78 KG/M2 | DIASTOLIC BLOOD PRESSURE: 85 MMHG | HEART RATE: 100 BPM | WEIGHT: 187 LBS | OXYGEN SATURATION: 96 % | HEIGHT: 73 IN | SYSTOLIC BLOOD PRESSURE: 126 MMHG

## 2023-05-11 DIAGNOSIS — M54.9 DORSALGIA, UNSPECIFIED: ICD-10-CM

## 2023-05-11 NOTE — DISCUSSION/SUMMARY
[de-identified] : 57y Mchronic drinking hx fall on L hip w/ radicular lbp pain\par \par xrays reviewed no fxs or dislocations noted\par possible sciatic nerve pain\par recommend PT for hip and lower back strengthening and rom exercises\par can followup in 6 weeks to assess his progress\par discussed with Dr. Garcia\par \par All medical record entries made by "aravind cordero" acting as a scribe for this encounter under the direction of "Dr. Garcia." I have reviewed the chart and agree that the record accurately reflects my personal performance of the history, physical exam, assessment and plan. I have also personally directed, reviewed and agreed with the chart.\par

## 2023-06-29 ENCOUNTER — APPOINTMENT (OUTPATIENT)
Dept: ORTHOPEDIC SURGERY | Facility: CLINIC | Age: 58
End: 2023-06-29

## 2023-07-27 NOTE — ED ADULT NURSE NOTE - NS ED NURSE REPORT GIVEN TO FT
Medicare Wellness Visit  Plan for Preventive Care    A good way for you to stay healthy is to use preventive care.  Medicare covers many services that can help you stay healthy.* The goal of these services is to find any health problems as quickly as possible. Finding problems early can help make them easier to treat.  Your personal plan below lists the services you may need and when they are due.      Health Maintenance Summary     Hepatitis C Screening (Once)  Overdue - never done    Pneumococcal Vaccine 65+ (2 - PCV)  Overdue since 7/26/2019    COVID-19 Vaccine (4 - Moderna series)  Overdue since 1/24/2022    Osteoporosis Screening (Once)  Overdue - never done    Medicare Advantage- Medicare Wellness Visit (Yearly - January to December)  Overdue since 1/1/2023    Depression Screening (Yearly)  Overdue since 6/2/2023    Influenza Vaccine (1)  Next due on 9/1/2023    Breast Cancer Screening (Every 2 Years)  Next due on 8/18/2024    Colorectal Cancer Risk - Colonoscopy (Every 7 Years)  Next due on 11/1/2025    DTaP/Tdap/Td Vaccine (3 - Td or Tdap)  Next due on 7/12/2032    Shingles Vaccine   Completed    Meningococcal Vaccine   Aged Out    Hepatitis B Vaccine (For Physician/APC Discussion)   Aged Out    HPV Vaccine   Aged Out    Colorectal Cancer Screen-   Discontinued           Preventive Care for Women and Men    Heart Screenings (Cardiovascular):  · Blood tests are used to check your cholesterol, lipid and triglyceride levels. High levels can increase your risk for heart disease and stroke. High levels can be treated with medications, diet and exercise. Lowering your levels can help keep your heart and blood vessels healthy.  Your provider will order these tests if they are needed.    · An ultrasound is done to see if you have an abdominal aortic aneurysm (AAA).  This is an enlargement of one of the main blood vessels that delivers blood to the body.   In the [place], 9,000 deaths are caused by AAA.  You  may not even know you have this problem and as many as 1 in 3 people will have a serious problem if it is not treated.  Early diagnosis allows for more effective treatment and cure.  If you have a family history of AAA or are a male age 65-75 who has smoked, you are at higher risk of an AAA.  Your provider can order this test, if needed.    Colorectal Screening:  · There are many tests that are used to check for cancer of your colon and rectum. You and your provider should discuss what test is best for you and when to have it done.  Options include:  · Screening Colonoscopy: exam of the entire colon, seen through a flexible lighted tube.  · Flexible Sigmoidoscopy: exam of the last third (sigmoid portion) of the colon and rectum, seen through a flexible lighted tube.  · Cologuard DNA stool test: a sample of your stool is used to screen for cancer and unseen blood in your stool.  · Fecal Occult Blood Test: a sample of your stool is studied to find any unseen blood    Flu Shot:  · An immunization that helps to prevent influenza (the flu). You should get this every year. The best time to get the shot is in the fall.    Pneumococcal Shot:  • Vaccines help prevent pneumococcal disease, which is any type of illness caused by Streptococcus pneumoniae bacteria. There are two kinds of pneumococcal vaccines available in the [place]:   o Pneumococcal conjugate vaccines (PCV20 or Rrscyjz70®)  o Pneumococcal polysaccharide vaccine (PPSV23 or [id number]®)  · For those who have never received any pneumococcal conjugate vaccine, CDC recommends PVC20 for adults 65 years or older and adults 19 through 64 years old with certain medical conditions or risk factors.   · For those who have previously received PCV13, this should be followed by a dose of PPSV23.     Hepatitis B Shot:  · An immunization that helps to protect people from getting Hepatitis B. Hepatitis B is a virus that spreads through contact with infected blood or body  fluids. Many people with the virus do not have symptoms.  The virus can lead to serious problems, such as liver disease. Some people are at higher risk than others. Your doctor will tell you if you need this shot.     Diabetes Screening:  · A test to measure sugar (glucose) in your blood is called a fasting blood sugar. Fasting means you cannot have food or drink for at least 8 hours before the test. This test can detect diabetes long before you may notice symptoms.    Glaucoma Screening:  · Glaucoma screening is performed by your eye doctor. The test measures the fluid pressure inside your eyes to determine if you have glaucoma.     Hepatitis C Screening:  · A blood test to see if you have the hepatitis C virus.  Hepatitis C attacks the liver and is a major cause of chronic liver disease.  Medicare will cover a single screening for all adults born between 1945 & 1965, or high risk patients (people who have injected illegal drugs or people who have had blood transfusions).  High risk patients who continue to inject illegal drugs can be screened for Hepatitis C every year.    Smoking and Tobacco-Use Cessation Counseling:  · Tobacco is the single greatest cause of disease and early death in our country today. Medication and counseling together can increase a person’s chance of quitting for good.   · Medicare covers two quitting attempts per year, with four counseling sessions per attempt (eight sessions in a 12 month period)    Preventive Screening tests for Women    Screening Mammograms and Breast Exams:  · An x-ray of your breasts to check for breast cancer before you or your doctor may be able to feel it.  If breast cancer is found early it can usually be treated with success.    Pelvic Exams and Pap Tests:  · An exam to check for cervical and vaginal cancer. A Pap test is a lab test in which cells are taken from your cervix and sent to the lab to look for signs of cervical cancer. If cancer of the cervix is found  early, chances for a cure are good. Testing can generally end at age 65, or if a woman has a hysterectomy for a benign condition. Your provider may recommend more frequent testing if certain abnormal results are found.    Bone Mass Measurements:  · A painless x-ray of your bone density to see if you are at risk for a broken bone. Bone density refers to the thickness of bones or how tightly the bone tissue is packed.    Preventive Screening tests for Men    Prostate Screening:  · Should you have a prostate cancer test (PSA)?  It is up to you to decide if you want a prostate cancer test. Talk to your clinician to find out if the test is right for you.  Things for you to consider and talk about should include:  · Benefits and harms of the test  · Your family history  · How your race/ethnicity may influence the test  · If the test may impact other medical conditions you have  · Your values on screenings and treatments    *Medicare pays for many preventive services to keep you healthy. For some of these services, you might have to pay a deductible, coinsurance, and / or copayment.  The amounts vary depending on the type of services you need and the kind of Medicare health plan you have.    For further details on screenings offered by Medicare please visit: https://www.medicare.gov/coverage/preventive-screening-services    CITLALI Garrison [facility] RN MENA

## 2023-08-10 ENCOUNTER — EMERGENCY (EMERGENCY)
Facility: HOSPITAL | Age: 58
LOS: 1 days | Discharge: HOME | End: 2023-08-10
Attending: EMERGENCY MEDICINE | Admitting: EMERGENCY MEDICINE
Payer: MEDICARE

## 2023-08-10 VITALS
WEIGHT: 197.09 LBS | HEART RATE: 118 BPM | DIASTOLIC BLOOD PRESSURE: 76 MMHG | RESPIRATION RATE: 20 BRPM | SYSTOLIC BLOOD PRESSURE: 143 MMHG | TEMPERATURE: 100 F | OXYGEN SATURATION: 96 % | HEIGHT: 73 IN

## 2023-08-10 VITALS
TEMPERATURE: 99 F | OXYGEN SATURATION: 99 % | DIASTOLIC BLOOD PRESSURE: 86 MMHG | RESPIRATION RATE: 18 BRPM | HEART RATE: 100 BPM | SYSTOLIC BLOOD PRESSURE: 144 MMHG

## 2023-08-10 DIAGNOSIS — Z86.711 PERSONAL HISTORY OF PULMONARY EMBOLISM: ICD-10-CM

## 2023-08-10 DIAGNOSIS — U07.1 COVID-19: ICD-10-CM

## 2023-08-10 DIAGNOSIS — R00.0 TACHYCARDIA, UNSPECIFIED: ICD-10-CM

## 2023-08-10 DIAGNOSIS — E78.5 HYPERLIPIDEMIA, UNSPECIFIED: ICD-10-CM

## 2023-08-10 DIAGNOSIS — K21.9 GASTRO-ESOPHAGEAL REFLUX DISEASE WITHOUT ESOPHAGITIS: ICD-10-CM

## 2023-08-10 DIAGNOSIS — R79.89 OTHER SPECIFIED ABNORMAL FINDINGS OF BLOOD CHEMISTRY: ICD-10-CM

## 2023-08-10 DIAGNOSIS — R06.02 SHORTNESS OF BREATH: ICD-10-CM

## 2023-08-10 DIAGNOSIS — Z79.02 LONG TERM (CURRENT) USE OF ANTITHROMBOTICS/ANTIPLATELETS: ICD-10-CM

## 2023-08-10 DIAGNOSIS — F17.200 NICOTINE DEPENDENCE, UNSPECIFIED, UNCOMPLICATED: ICD-10-CM

## 2023-08-10 DIAGNOSIS — I10 ESSENTIAL (PRIMARY) HYPERTENSION: ICD-10-CM

## 2023-08-10 DIAGNOSIS — I25.10 ATHEROSCLEROTIC HEART DISEASE OF NATIVE CORONARY ARTERY WITHOUT ANGINA PECTORIS: ICD-10-CM

## 2023-08-10 LAB
ALBUMIN SERPL ELPH-MCNC: 4.3 G/DL — SIGNIFICANT CHANGE UP (ref 3.3–5)
ALP SERPL-CCNC: 169 U/L — HIGH (ref 40–120)
ALT FLD-CCNC: 55 U/L — HIGH (ref 10–45)
ANION GAP SERPL CALC-SCNC: 13 MMOL/L — SIGNIFICANT CHANGE UP (ref 5–17)
APPEARANCE UR: CLEAR — SIGNIFICANT CHANGE UP
APTT BLD: 30.8 SEC — SIGNIFICANT CHANGE UP (ref 24.5–35.6)
AST SERPL-CCNC: 115 U/L — HIGH (ref 10–40)
BASOPHILS # BLD AUTO: 0.09 K/UL — SIGNIFICANT CHANGE UP (ref 0–0.2)
BASOPHILS NFR BLD AUTO: 1.6 % — SIGNIFICANT CHANGE UP (ref 0–2)
BILIRUB SERPL-MCNC: 1.3 MG/DL — HIGH (ref 0.2–1.2)
BILIRUB UR-MCNC: NEGATIVE — SIGNIFICANT CHANGE UP
BUN SERPL-MCNC: 6 MG/DL — LOW (ref 7–23)
CALCIUM SERPL-MCNC: 9.4 MG/DL — SIGNIFICANT CHANGE UP (ref 8.4–10.5)
CHLORIDE SERPL-SCNC: 96 MMOL/L — SIGNIFICANT CHANGE UP (ref 96–108)
CO2 SERPL-SCNC: 23 MMOL/L — SIGNIFICANT CHANGE UP (ref 22–31)
COLOR SPEC: YELLOW — SIGNIFICANT CHANGE UP
CREAT SERPL-MCNC: 0.75 MG/DL — SIGNIFICANT CHANGE UP (ref 0.5–1.3)
DIFF PNL FLD: NEGATIVE — SIGNIFICANT CHANGE UP
EGFR: 104 ML/MIN/1.73M2 — SIGNIFICANT CHANGE UP
EOSINOPHIL # BLD AUTO: 0.05 K/UL — SIGNIFICANT CHANGE UP (ref 0–0.5)
EOSINOPHIL NFR BLD AUTO: 0.9 % — SIGNIFICANT CHANGE UP (ref 0–6)
GLUCOSE SERPL-MCNC: 123 MG/DL — HIGH (ref 70–99)
GLUCOSE UR QL: NEGATIVE — SIGNIFICANT CHANGE UP
HCT VFR BLD CALC: 33.2 % — LOW (ref 39–50)
HGB BLD-MCNC: 11.2 G/DL — LOW (ref 13–17)
IMM GRANULOCYTES NFR BLD AUTO: 0.5 % — SIGNIFICANT CHANGE UP (ref 0–0.9)
INR BLD: 1.1 — SIGNIFICANT CHANGE UP (ref 0.85–1.18)
KETONES UR-MCNC: NEGATIVE — SIGNIFICANT CHANGE UP
LACTATE SERPL-SCNC: 0.9 MMOL/L — SIGNIFICANT CHANGE UP (ref 0.5–2)
LACTATE SERPL-SCNC: 2.6 MMOL/L — HIGH (ref 0.5–2)
LEUKOCYTE ESTERASE UR-ACNC: NEGATIVE — SIGNIFICANT CHANGE UP
LYMPHOCYTES # BLD AUTO: 0.63 K/UL — LOW (ref 1–3.3)
LYMPHOCYTES # BLD AUTO: 10.9 % — LOW (ref 13–44)
MCHC RBC-ENTMCNC: 32.9 PG — SIGNIFICANT CHANGE UP (ref 27–34)
MCHC RBC-ENTMCNC: 33.7 GM/DL — SIGNIFICANT CHANGE UP (ref 32–36)
MCV RBC AUTO: 97.6 FL — SIGNIFICANT CHANGE UP (ref 80–100)
MONOCYTES # BLD AUTO: 0.79 K/UL — SIGNIFICANT CHANGE UP (ref 0–0.9)
MONOCYTES NFR BLD AUTO: 13.6 % — SIGNIFICANT CHANGE UP (ref 2–14)
NEUTROPHILS # BLD AUTO: 4.2 K/UL — SIGNIFICANT CHANGE UP (ref 1.8–7.4)
NEUTROPHILS NFR BLD AUTO: 72.5 % — SIGNIFICANT CHANGE UP (ref 43–77)
NITRITE UR-MCNC: NEGATIVE — SIGNIFICANT CHANGE UP
NRBC # BLD: 0 /100 WBCS — SIGNIFICANT CHANGE UP (ref 0–0)
PH UR: 6.5 — SIGNIFICANT CHANGE UP (ref 5–8)
PLATELET # BLD AUTO: 102 K/UL — LOW (ref 150–400)
POTASSIUM SERPL-MCNC: 3.6 MMOL/L — SIGNIFICANT CHANGE UP (ref 3.5–5.3)
POTASSIUM SERPL-SCNC: 3.6 MMOL/L — SIGNIFICANT CHANGE UP (ref 3.5–5.3)
PROT SERPL-MCNC: 7.2 G/DL — SIGNIFICANT CHANGE UP (ref 6–8.3)
PROT UR-MCNC: NEGATIVE MG/DL — SIGNIFICANT CHANGE UP
PROTHROM AB SERPL-ACNC: 12.5 SEC — SIGNIFICANT CHANGE UP (ref 9.5–13)
RAPID RVP RESULT: DETECTED
RBC # BLD: 3.4 M/UL — LOW (ref 4.2–5.8)
RBC # FLD: 12.8 % — SIGNIFICANT CHANGE UP (ref 10.3–14.5)
SARS-COV-2 RNA SPEC QL NAA+PROBE: DETECTED
SODIUM SERPL-SCNC: 132 MMOL/L — LOW (ref 135–145)
SP GR SPEC: 1.01 — SIGNIFICANT CHANGE UP (ref 1–1.03)
TROPONIN T, HIGH SENSITIVITY RESULT: 8 NG/L — SIGNIFICANT CHANGE UP (ref 0–51)
UROBILINOGEN FLD QL: 1 E.U./DL — SIGNIFICANT CHANGE UP
WBC # BLD: 5.79 K/UL — SIGNIFICANT CHANGE UP (ref 3.8–10.5)
WBC # FLD AUTO: 5.79 K/UL — SIGNIFICANT CHANGE UP (ref 3.8–10.5)

## 2023-08-10 PROCEDURE — 93005 ELECTROCARDIOGRAM TRACING: CPT

## 2023-08-10 PROCEDURE — 0225U NFCT DS DNA&RNA 21 SARSCOV2: CPT

## 2023-08-10 PROCEDURE — 85610 PROTHROMBIN TIME: CPT

## 2023-08-10 PROCEDURE — 85025 COMPLETE CBC W/AUTO DIFF WBC: CPT

## 2023-08-10 PROCEDURE — 83605 ASSAY OF LACTIC ACID: CPT

## 2023-08-10 PROCEDURE — 99285 EMERGENCY DEPT VISIT HI MDM: CPT

## 2023-08-10 PROCEDURE — 87040 BLOOD CULTURE FOR BACTERIA: CPT

## 2023-08-10 PROCEDURE — 71045 X-RAY EXAM CHEST 1 VIEW: CPT | Mod: 26

## 2023-08-10 PROCEDURE — 81003 URINALYSIS AUTO W/O SCOPE: CPT

## 2023-08-10 PROCEDURE — 36415 COLL VENOUS BLD VENIPUNCTURE: CPT

## 2023-08-10 PROCEDURE — 85730 THROMBOPLASTIN TIME PARTIAL: CPT

## 2023-08-10 PROCEDURE — 71045 X-RAY EXAM CHEST 1 VIEW: CPT

## 2023-08-10 PROCEDURE — 84484 ASSAY OF TROPONIN QUANT: CPT

## 2023-08-10 PROCEDURE — 80053 COMPREHEN METABOLIC PANEL: CPT

## 2023-08-10 PROCEDURE — 99285 EMERGENCY DEPT VISIT HI MDM: CPT | Mod: 25

## 2023-08-10 RX ORDER — SODIUM CHLORIDE 9 MG/ML
1000 INJECTION INTRAMUSCULAR; INTRAVENOUS; SUBCUTANEOUS ONCE
Refills: 0 | Status: COMPLETED | OUTPATIENT
Start: 2023-08-10 | End: 2023-08-10

## 2023-08-10 RX ORDER — ALBUTEROL 90 UG/1
2 AEROSOL, METERED ORAL
Qty: 1 | Refills: 0
Start: 2023-08-10

## 2023-08-10 RX ORDER — ACETAMINOPHEN 500 MG
650 TABLET ORAL ONCE
Refills: 0 | Status: COMPLETED | OUTPATIENT
Start: 2023-08-10 | End: 2023-08-10

## 2023-08-10 RX ADMIN — SODIUM CHLORIDE 1000 MILLILITER(S): 9 INJECTION INTRAMUSCULAR; INTRAVENOUS; SUBCUTANEOUS at 19:58

## 2023-08-10 RX ADMIN — Medication 650 MILLIGRAM(S): at 20:53

## 2023-08-10 RX ADMIN — SODIUM CHLORIDE 1000 MILLILITER(S): 9 INJECTION INTRAMUSCULAR; INTRAVENOUS; SUBCUTANEOUS at 19:57

## 2023-08-10 NOTE — ED PROVIDER NOTE - INTERNATIONAL TRAVEL
Received an outside INR from [employer] via fax on 1/31/20.    INR date: 1/31/20  INR result: 3.34    Tracking has been updated.  Electronically signed: BRITTANY Wilkes       No

## 2023-08-10 NOTE — ED ADULT NURSE NOTE - NS ED PATIENT SAFETY CONCERN
Preop diagnosis colon cancer screening    Postop diagnosis mild sigmoid diverticulosis    Procedure: Colonoscopy to terminal ileum. Endoscopic photos    Indication:  See. preop diagnosis  Surgeon:  Dr. Quirgoa    Anesthesia:  Demerol 100 mg Versed 4 mg      Instrument used:  Olympus CFH q.190    Pre-procedure evaluation including cardiopulmonary auscultation was benign.  The patient was felt to be stable for purposes of  conscious sedation and endoscopy.  The procedure was explained to the patient and questions answered.  Informed consent was obtained.  The patient was monitored with an automatic blood pressure cuff,  pulse oximetry, continuous EKG and close clinical observation.       TECHNIQUE AND FINDINGS:  The patient was examined in the left lateral position.  External examination revealed no bleeding, fissures or tears.  Digital rectal examination revealed no masses.     The colonoscope was then passed into the rectum and advanced to the cecum without difficulty.  The cecum, ileocecal valve and appendiceal orifice, and normal cecal anatomy were identified.  The Ileocecal valve was intubated and distal 20 cm of terminal ileum was examined and appeared normal   As the scope was withdrawn, the terminal ileum, cecum, ascending, transverse, descending, sigmoid colon and rectum were re-examined.  The prep was excellent .   The rectum was also examined in a retrograde fashion.  There were no mucosal abnormalities and no evidence of neoplasia. There were small scattered diverticula in the sigmoid colon without inflammation .       Overall, the patient tolerated the procedure well without any undue discomfort, desaturation or hypotension. Estimated blood loss was negligible .  The patient will be recovered in outpatient and discharged home with family.  The patient will resume pre-endoscopic medication, activity and diet.There are no pathology reports pending.  Re-exam is recommended in 10 years.     No

## 2023-08-10 NOTE — ED ADULT TRIAGE NOTE - CHIEF COMPLAINT QUOTE
Pt walked in c/o 2x days of worsening fever, dry cough, N/V, and SOB. Pt denies any CP or dizziness.

## 2023-08-10 NOTE — ED PROVIDER NOTE - CLINICAL SUMMARY MEDICAL DECISION MAKING FREE TEXT BOX
57 year-old male with past medical history of hypertension on lisinopril and diltiazem, HLD on atorvastatin, history of CAD with?  Balloon angioplasty, GERD on omeprazole, history of PE (but was taken off anticoagulation secondary to splenic bleed, and is currently not on any AC including aspirin), daily smoker half pack per day, and heavy alcohol use, vaccinated for COVID-19, presents with 2 days of subjective fevers, cough, congestion, rhinorrhea, N/V/D and SOB. Pt denies any CP, abd pain or dizziness.      ED course: Vital signs noted.  Patient with temp of 100.4 orally and heart rate 119 with BP 140s/70s. ECG with sinus tachycardia, no acute ST-T changes. CXR with NAD. Labs noted and with mildly elevated lactic acid.  White blood count within normal limits.  Mild elevation in LFTs also noted.  Patient given 2 L IV fluid with normalization of lactate.  Tested positive for COVID-19.  Findings discussed with patient and supportive care advised.  Patient given Rx for albuterol. 57 year-old male with past medical history of hypertension on lisinopril and diltiazem, HLD on atorvastatin, history of CAD with?  Balloon angioplasty, GERD on omeprazole, history of PE (but was taken off anticoagulation secondary to splenic bleed, and is currently not on any AC including aspirin), daily smoker half pack per day, and heavy alcohol use, vaccinated for COVID-19, presents with 2 days of subjective fevers, cough, congestion, rhinorrhea, N/V/D and SOB. Pt denies any CP, abd pain or dizziness.      ED course: Vital signs noted.  Patient with temp of 100.4 (orally), heart rate 119 and BP 140s/70s, rest of VS WNL. ECG with sinus tachycardia, no acute ST-T changes. CXR with NAD. Labs noted and with mildly elevated lactate at 2.6. White blood count within normal limits.  Mild elevation in LFTs also noted.  Patient given 2 L IV fluid with normalization of lactate to 0.9. Tested positive for COVID-19. Findings discussed with patient and supportive care advised.  Patient given Rx for albuterol. Non-toxic appearing and stable for discharge. To follow up outpatient. Strict return precautions given.

## 2023-08-10 NOTE — ED PROVIDER NOTE - PSYCHIATRIC, MLM
Alert and oriented to person, place, time/situation. normal mood and affect. no apparent risk to self or others. Alert and oriented. Normal mood and affect. no apparent risk to self or others.

## 2023-08-10 NOTE — ED PROVIDER NOTE - OBJECTIVE STATEMENT
57 year-old male with past medical history of hypertension on lisinopril and diltiazem, HLD on atorvastatin, history of CAD with?  Balloon angioplasty, GERD on omeprazole, history of PE but was taken off anticoagulation secondary to splenic bleed and is currently not on any AC including aspirin, daily smoker half pack per day, and heavy alcohol use, vaccinated for COVID-19, presents with 2 days of subjective fevers, cough, congestion, rhinorrhea, N/V/D and SOB. Pt denies any CP, abd pain or dizziness. 57 year-old male with past medical history of hypertension on lisinopril and diltiazem, HLD on atorvastatin, history of CAD with balloon angioplasty, GERD on omeprazole, history of PE and was taken off anticoagulation secondary to splenic bleed and is currently not on any AC including aspirin, daily smoker half pack per day, and heavy alcohol use, vaccinated for COVID-19, presents with 2 days of subjective fevers, cough, congestion, rhinorrhea, N/V/D and SOB. Pt denies any CP, abd pain or dizziness.

## 2023-08-10 NOTE — ED PROVIDER NOTE - PATIENT PORTAL LINK FT
You can access the FollowMyHealth Patient Portal offered by [facility] by registering at the following website: [email or web address]. By joining [employer]’s FollowMyHealth portal, you will also be able to view your health information using other applications (apps) compatible with our system.

## 2023-08-10 NOTE — ED ADULT NURSE NOTE - OBJECTIVE STATEMENT
Pt to ED c/o HART, fever/chills x 2 days, nausea/vomiting, cough. Pt denies abd pain, CP, palpitations, SOB at rest. Pt with hx CAD, 1/2 ppd smoker. Endorses drinking 6pack beer per day, denies hx w/d. Pt has not taken tylenol. A&ox4. RR even and unlabored on RA. EKG performed in triage. Ambulatory.

## 2023-08-10 NOTE — ED ADULT NURSE NOTE - DRUG PRE-SCREENING (DAST -1)
Statement Selected impaired balance/decreased strength/Ambulation with NBQC 40 feet with min A/CG/decreased ROM

## 2023-09-13 NOTE — PATIENT PROFILE ADULT. - AS SC BRADEN ACTIVITY
(3) walks occasionally Cimzia Counseling:  I discussed with the patient the risks of Cimzia including but not limited to immunosuppression, allergic reactions and infections.  The patient understands that monitoring is required including a PPD at baseline and must alert us or the primary physician if symptoms of infection or other concerning signs are noted.

## 2023-10-23 NOTE — PATIENT PROFILE ADULT. - AGENT'S NAME
This was a shared visit with the KILLIAN. I reviewed and verified the documentation and independently performed the documented:
Anne-Marie Huff

## 2024-03-15 NOTE — DISCHARGE NOTE ADULT - PATIENT PORTAL LINK FT
Received notification from Dr. Reed that she was contacted by [facility] that Avelina's family was trying to schedule an appt with peds [facility] there. She would like clarification from this from dad to confirm if so so that she can assist in facilitating the transfer. Call placed to [place]. No answer. MELBA left requesting call back.   
“You can access the FollowHealth Patient Portal, offered by [facility], by registering with the following website: [email or web address]”

## 2024-04-01 NOTE — ED ADULT NURSE NOTE - CINV DISCH TEACH PARTICIP
Patient recently joined the [employer] 
Patient

## 2024-05-11 NOTE — PATIENT PROFILE ADULT. - TOBACCO USE
You were seen in the emergency department for headache, your labs and imaging were all reassuring.  Dr. Novoa will connect you with the the headache specialists.    If you develop a fever, if you start to have weakness or numbness on one side of your body, if you hae sudden severe worsening of your headache, fainting, or vomiting to the point where you can't keep anything down, please come back to the emergency department.   
Current every day smoker

## 2024-09-04 ENCOUNTER — EMERGENCY (EMERGENCY)
Facility: HOSPITAL | Age: 59
LOS: 1 days | Discharge: HOME | End: 2024-09-04
Admitting: STUDENT IN AN ORGANIZED HEALTH CARE EDUCATION/TRAINING PROGRAM
Payer: MEDICARE

## 2024-09-04 VITALS
DIASTOLIC BLOOD PRESSURE: 77 MMHG | SYSTOLIC BLOOD PRESSURE: 119 MMHG | TEMPERATURE: 98 F | OXYGEN SATURATION: 100 % | HEART RATE: 78 BPM | RESPIRATION RATE: 18 BRPM

## 2024-09-04 PROCEDURE — 99283 EMERGENCY DEPT VISIT LOW MDM: CPT

## 2024-09-04 PROCEDURE — 99285 EMERGENCY DEPT VISIT HI MDM: CPT

## 2024-09-07 DIAGNOSIS — F17.200 NICOTINE DEPENDENCE, UNSPECIFIED, UNCOMPLICATED: ICD-10-CM

## 2024-09-07 DIAGNOSIS — F10.90 ALCOHOL USE, UNSPECIFIED, UNCOMPLICATED: ICD-10-CM

## 2024-09-07 DIAGNOSIS — F10.129 ALCOHOL ABUSE WITH INTOXICATION, UNSPECIFIED: ICD-10-CM

## 2025-01-09 NOTE — ED ADULT NURSE NOTE - PMH
Patient is scheduled to see me as a new patient on 1/29/2025 for sleep apnea.  I will need a copy of patient's sleep study and please find out who his DME company is before I see this patient.  Thank you.   Angina pectoris  Anginal pain  Atherosclerosis of coronary artery  CAD (coronary artery disease)  Essential hypertension  HTN (hypertension)  Nondependent alcohol abuse  Alcohol abuse  Pancreatitis

## 2025-02-05 NOTE — ED ADULT NURSE NOTE - PAIN: BODY LOCATION
[facility]  [facility]  [address] [place] [address] [address]  [address]  [facility] [address]  Dept: [phone]  Dept Fax: [phone]    Kelsey Owen is a 51 y.o. female who presentstoday for her medical conditions/complaints as noted below.  Kelsey Owen is c/o of  Chief Complaint   Patient presents with    ADHD         HPI:     History of Present Illness  The patient presents for evaluation of hypothyroidism, left knee pain, and medication management.    She has been adhering to the new dosage regimen of her thyroid medication, which she reports as being beneficial. She consumes a significant amount of soda, often in conjunction with her thyroid medication. She has expressed interest in exploring the use of Cytomel, a T3 supplement, as suggested by her pharmacist. She recalls a positive experience with a compounded thyroid medication during her tenure at [employer], which she believes contributed to her overall well-being. She has also initiated a collagen supplement, which she perceives as beneficial to her digestive system. She is inquiring about weight loss medications that are covered by her insurance.    She has been experiencing persistent issues with her left knee following a hip surgery performed in 01/2024. Post-surgery, fluid was drained from her knee and an injection was administered by Dr. Llanos and his nurse practitioner, Jessica. The relief from these interventions has since subsided, necessitating the use of a brace. She has not sought further treatment due to uncertainty about the need for a referral.    She is currently on Adderall and cholesterol medications. She is due for a refill on her Zanaflex. She is getting good 6 hours of sleep.    MEDICATIONS  Current: Synthroid, Adderall, Zanaflex      Hemoglobin A1C (%)   Date Value   01/04/2024 5.4   05/30/2023 5.6   01/25/2022 5.7             ( goal A1C is < 7)   No components found for: \"LABMICR\"  No components found for: 
epigastric

## 2025-02-12 NOTE — ED ADULT TRIAGE NOTE - PRO INTERPRETER NEED 2
[Well Developed] : well developed [Well Nourished] : well nourished [No Acute Distress] : no acute distress [Normal Conjunctiva] : normal conjunctiva [Normal Venous Pressure] : normal venous pressure [No Carotid Bruit] : no carotid bruit [Normal S1, S2] : normal S1, S2 [No Murmur] : no murmur [No Rub] : no rub [No Gallop] : no gallop [Clear Lung Fields] : clear lung fields [Good Air Entry] : good air entry [No Respiratory Distress] : no respiratory distress  [Soft] : abdomen soft [Non Tender] : non-tender [No Masses/organomegaly] : no masses/organomegaly [Normal Bowel Sounds] : normal bowel sounds [Normal Gait] : normal gait [No Edema] : no edema [No Cyanosis] : no cyanosis [No Clubbing] : no clubbing [No Varicosities] : no varicosities English [No Rash] : no rash [No Skin Lesions] : no skin lesions [Moves all extremities] : moves all extremities [No Focal Deficits] : no focal deficits [Normal Speech] : normal speech [Alert and Oriented] : alert and oriented [Normal memory] : normal memory

## 2025-06-17 NOTE — ED ADULT NURSE NOTE - HOW OFTEN DO YOU HAVE SIX OR MORE DRINKS ON ONE OCCASION?
Detail Level: Detailed In addition to cryotherapy, I recommend treatment with fluorouracil 5% cream QD for 2 weeks to the scalp; he has the medication at home from a prior prescription.  Four or more times a week

## 2025-07-27 ENCOUNTER — EMERGENCY (EMERGENCY)
Facility: HOSPITAL | Age: 60
LOS: 1 days | End: 2025-07-27
Attending: EMERGENCY MEDICINE | Admitting: EMERGENCY MEDICINE
Payer: MEDICARE

## 2025-07-27 VITALS
DIASTOLIC BLOOD PRESSURE: 76 MMHG | RESPIRATION RATE: 18 BRPM | OXYGEN SATURATION: 99 % | SYSTOLIC BLOOD PRESSURE: 124 MMHG | TEMPERATURE: 98 F | HEART RATE: 81 BPM

## 2025-07-27 VITALS
RESPIRATION RATE: 17 BRPM | OXYGEN SATURATION: 95 % | DIASTOLIC BLOOD PRESSURE: 62 MMHG | SYSTOLIC BLOOD PRESSURE: 100 MMHG | HEART RATE: 79 BPM | TEMPERATURE: 98 F

## 2025-07-27 LAB
ALBUMIN SERPL ELPH-MCNC: 3.9 G/DL — SIGNIFICANT CHANGE UP (ref 3.3–5)
ALBUMIN SERPL ELPH-MCNC: 4.1 G/DL — SIGNIFICANT CHANGE UP (ref 3.3–5)
ALP SERPL-CCNC: 160 U/L — HIGH (ref 40–120)
ALP SERPL-CCNC: 172 U/L — HIGH (ref 40–120)
ALT FLD-CCNC: 20 U/L — SIGNIFICANT CHANGE UP (ref 10–45)
ALT FLD-CCNC: SIGNIFICANT CHANGE UP (ref 10–45)
ANION GAP SERPL CALC-SCNC: 12 MMOL/L — SIGNIFICANT CHANGE UP (ref 5–17)
APTT BLD: 28.5 SEC — SIGNIFICANT CHANGE UP (ref 26.1–36.8)
AST SERPL-CCNC: 39 U/L — SIGNIFICANT CHANGE UP (ref 10–40)
AST SERPL-CCNC: SIGNIFICANT CHANGE UP (ref 10–40)
BASOPHILS # BLD AUTO: 0.1 K/UL — SIGNIFICANT CHANGE UP (ref 0–0.2)
BASOPHILS NFR BLD AUTO: 1.1 % — SIGNIFICANT CHANGE UP (ref 0–2)
BILIRUB SERPL-MCNC: 0.5 MG/DL — SIGNIFICANT CHANGE UP (ref 0.2–1.2)
BILIRUB SERPL-MCNC: 0.6 MG/DL — SIGNIFICANT CHANGE UP (ref 0.2–1.2)
BUN SERPL-MCNC: 11 MG/DL — SIGNIFICANT CHANGE UP (ref 7–23)
BUN SERPL-MCNC: 12 MG/DL — SIGNIFICANT CHANGE UP (ref 7–23)
BUN SERPL-MCNC: 12 MG/DL — SIGNIFICANT CHANGE UP (ref 7–23)
CALCIUM SERPL-MCNC: 8.7 MG/DL — SIGNIFICANT CHANGE UP (ref 8.4–10.5)
CALCIUM SERPL-MCNC: 8.8 MG/DL — SIGNIFICANT CHANGE UP (ref 8.4–10.5)
CALCIUM SERPL-MCNC: 9.1 MG/DL — SIGNIFICANT CHANGE UP (ref 8.4–10.5)
CHLORIDE SERPL-SCNC: 91 MMOL/L — LOW (ref 96–108)
CHLORIDE SERPL-SCNC: 91 MMOL/L — LOW (ref 96–108)
CHLORIDE SERPL-SCNC: 97 MMOL/L — SIGNIFICANT CHANGE UP (ref 96–108)
CO2 SERPL-SCNC: 21 MMOL/L — LOW (ref 22–31)
CO2 SERPL-SCNC: 21 MMOL/L — LOW (ref 22–31)
CO2 SERPL-SCNC: 22 MMOL/L — SIGNIFICANT CHANGE UP (ref 22–31)
CREAT SERPL-MCNC: 0.64 MG/DL — SIGNIFICANT CHANGE UP (ref 0.5–1.3)
CREAT SERPL-MCNC: 0.68 MG/DL — SIGNIFICANT CHANGE UP (ref 0.5–1.3)
CREAT SERPL-MCNC: 0.69 MG/DL — SIGNIFICANT CHANGE UP (ref 0.5–1.3)
EGFR: 105 ML/MIN/1.73M2 — SIGNIFICANT CHANGE UP
EGFR: 105 ML/MIN/1.73M2 — SIGNIFICANT CHANGE UP
EGFR: 106 ML/MIN/1.73M2 — SIGNIFICANT CHANGE UP
EGFR: 106 ML/MIN/1.73M2 — SIGNIFICANT CHANGE UP
EGFR: 108 ML/MIN/1.73M2 — SIGNIFICANT CHANGE UP
EGFR: 108 ML/MIN/1.73M2 — SIGNIFICANT CHANGE UP
EOSINOPHIL # BLD AUTO: 0.36 K/UL — SIGNIFICANT CHANGE UP (ref 0–0.5)
EOSINOPHIL NFR BLD AUTO: 4 % — SIGNIFICANT CHANGE UP (ref 0–6)
GLUCOSE SERPL-MCNC: 107 MG/DL — HIGH (ref 70–99)
GLUCOSE SERPL-MCNC: 145 MG/DL — HIGH (ref 70–99)
GLUCOSE SERPL-MCNC: 146 MG/DL — HIGH (ref 70–99)
HCT VFR BLD CALC: 30.3 % — LOW (ref 39–50)
HGB BLD-MCNC: 10.1 G/DL — LOW (ref 13–17)
IMM GRANULOCYTES # BLD AUTO: 0.02 K/UL — SIGNIFICANT CHANGE UP (ref 0–0.07)
IMM GRANULOCYTES NFR BLD AUTO: 0.2 % — SIGNIFICANT CHANGE UP (ref 0–0.9)
INR BLD: 1.06 — SIGNIFICANT CHANGE UP (ref 0.85–1.16)
LYMPHOCYTES # BLD AUTO: 2.81 K/UL — SIGNIFICANT CHANGE UP (ref 1–3.3)
LYMPHOCYTES NFR BLD AUTO: 31.1 % — SIGNIFICANT CHANGE UP (ref 13–44)
MAGNESIUM SERPL-MCNC: 1.7 MG/DL — SIGNIFICANT CHANGE UP (ref 1.6–2.6)
MCHC RBC-ENTMCNC: 31.2 PG — SIGNIFICANT CHANGE UP (ref 27–34)
MCHC RBC-ENTMCNC: 33.3 G/DL — SIGNIFICANT CHANGE UP (ref 32–36)
MCV RBC AUTO: 93.5 FL — SIGNIFICANT CHANGE UP (ref 80–100)
MONOCYTES # BLD AUTO: 1.32 K/UL — HIGH (ref 0–0.9)
MONOCYTES NFR BLD AUTO: 14.6 % — HIGH (ref 2–14)
NEUTROPHILS # BLD AUTO: 4.43 K/UL — SIGNIFICANT CHANGE UP (ref 1.8–7.4)
NEUTROPHILS NFR BLD AUTO: 49 % — SIGNIFICANT CHANGE UP (ref 43–77)
NRBC # BLD AUTO: 0 K/UL — SIGNIFICANT CHANGE UP (ref 0–0)
NRBC # FLD: 0 K/UL — SIGNIFICANT CHANGE UP (ref 0–0)
NRBC BLD AUTO-RTO: 0 /100 WBCS — SIGNIFICANT CHANGE UP (ref 0–0)
NT-PROBNP SERPL-SCNC: 51 PG/ML — SIGNIFICANT CHANGE UP (ref 0–300)
PLATELET # BLD AUTO: 128 K/UL — LOW (ref 150–400)
PMV BLD: 10.8 FL — SIGNIFICANT CHANGE UP (ref 7–13)
POTASSIUM SERPL-MCNC: 3.6 MMOL/L — SIGNIFICANT CHANGE UP (ref 3.5–5.3)
POTASSIUM SERPL-MCNC: 3.8 MMOL/L — SIGNIFICANT CHANGE UP (ref 3.5–5.3)
POTASSIUM SERPL-MCNC: SIGNIFICANT CHANGE UP (ref 3.5–5.3)
POTASSIUM SERPL-SCNC: 3.6 MMOL/L — SIGNIFICANT CHANGE UP (ref 3.5–5.3)
POTASSIUM SERPL-SCNC: 3.8 MMOL/L — SIGNIFICANT CHANGE UP (ref 3.5–5.3)
POTASSIUM SERPL-SCNC: SIGNIFICANT CHANGE UP (ref 3.5–5.3)
PROT SERPL-MCNC: 6.8 G/DL — SIGNIFICANT CHANGE UP (ref 6–8.3)
PROT SERPL-MCNC: 7.4 G/DL — SIGNIFICANT CHANGE UP (ref 6–8.3)
PROTHROM AB SERPL-ACNC: 12.4 SEC — SIGNIFICANT CHANGE UP (ref 9.9–13.4)
RBC # BLD: 3.24 M/UL — LOW (ref 4.2–5.8)
RBC # FLD: 17.6 % — HIGH (ref 10.3–14.5)
SODIUM SERPL-SCNC: 124 MMOL/L — LOW (ref 135–145)
SODIUM SERPL-SCNC: 125 MMOL/L — LOW (ref 135–145)
SODIUM SERPL-SCNC: 130 MMOL/L — LOW (ref 135–145)
TROPONIN T, HIGH SENSITIVITY RESULT: 10 NG/L — SIGNIFICANT CHANGE UP (ref 0–51)
TROPONIN T, HIGH SENSITIVITY RESULT: 11 NG/L — SIGNIFICANT CHANGE UP (ref 0–51)
TROPONIN T, HIGH SENSITIVITY RESULT: 12 NG/L — SIGNIFICANT CHANGE UP (ref 0–51)
WBC # BLD: 9.04 K/UL — SIGNIFICANT CHANGE UP (ref 3.8–10.5)
WBC # FLD AUTO: 9.04 K/UL — SIGNIFICANT CHANGE UP (ref 3.8–10.5)

## 2025-07-27 PROCEDURE — 83735 ASSAY OF MAGNESIUM: CPT

## 2025-07-27 PROCEDURE — 93010 ELECTROCARDIOGRAM REPORT: CPT

## 2025-07-27 PROCEDURE — 84484 ASSAY OF TROPONIN QUANT: CPT

## 2025-07-27 PROCEDURE — 85730 THROMBOPLASTIN TIME PARTIAL: CPT

## 2025-07-27 PROCEDURE — 71045 X-RAY EXAM CHEST 1 VIEW: CPT | Mod: 26

## 2025-07-27 PROCEDURE — 80053 COMPREHEN METABOLIC PANEL: CPT

## 2025-07-27 PROCEDURE — 99285 EMERGENCY DEPT VISIT HI MDM: CPT

## 2025-07-27 PROCEDURE — 71275 CT ANGIOGRAPHY CHEST: CPT

## 2025-07-27 PROCEDURE — 80048 BASIC METABOLIC PNL TOTAL CA: CPT

## 2025-07-27 PROCEDURE — 83880 ASSAY OF NATRIURETIC PEPTIDE: CPT

## 2025-07-27 PROCEDURE — 36415 COLL VENOUS BLD VENIPUNCTURE: CPT

## 2025-07-27 PROCEDURE — 99285 EMERGENCY DEPT VISIT HI MDM: CPT | Mod: 25

## 2025-07-27 PROCEDURE — 85610 PROTHROMBIN TIME: CPT

## 2025-07-27 PROCEDURE — 71045 X-RAY EXAM CHEST 1 VIEW: CPT

## 2025-07-27 PROCEDURE — 71275 CT ANGIOGRAPHY CHEST: CPT | Mod: 26

## 2025-07-27 PROCEDURE — 85025 COMPLETE CBC W/AUTO DIFF WBC: CPT

## 2025-07-27 PROCEDURE — 93005 ELECTROCARDIOGRAM TRACING: CPT

## 2025-07-27 RX ORDER — ASPIRIN 325 MG
162 TABLET ORAL ONCE
Refills: 0 | Status: COMPLETED | OUTPATIENT
Start: 2025-07-27 | End: 2025-07-27

## 2025-07-27 RX ADMIN — Medication 1000 MILLILITER(S): at 14:54

## 2025-07-27 RX ADMIN — Medication 162 MILLIGRAM(S): at 13:37

## 2025-07-27 NOTE — ED PROVIDER NOTE - OBJECTIVE STATEMENT
past medical history of hypertension on lisinopril and diltiazem, HLD on atorvastatin, history of CAD with balloon angioplasty, GERD on omeprazole, history of PE and was taken off anticoagulation secondary to splenic bleed and is currently not on any AC including aspirin, daily smoker half pack per day, and heavy alcohol use, here with right sided chest pain for past 3 days. Reports sharp stabbing intermittent pain that became more constant after bending down about 3 hours ago. Aggravated by movement, deep breath. Mild associated sob. No fever/chills, cough. Was up all night smoking/ drinking. Denies other drug use.

## 2025-07-27 NOTE — ED PROVIDER NOTE - NSFOLLOWUPINSTRUCTIONS_ED_ALL_ED_FT
Please see your primary care provider for followup.  Call for appointment.  If you have any problems with followup, please call the ED Referral Coordinator at [phone].  Return to the ER if symptoms worsen or other concerns. Stop / cut back on alcohol as it is damaging your liver. Take ibuprofen or tylenol as directed for pain.     Nonspecific Chest Pain  Chest pain can be caused by many different conditions. There is always a chance that your pain could be related to something serious, such as a heart attack or a blood clot in your lungs. Chest pain can also be caused by conditions that are not life-threatening. If you have chest pain, it is very important to follow up with your health care provider.    What are the causes?  Causes of this condition include:    Heartburn.  Pneumonia or bronchitis.  Anxiety or stress.  Inflammation around your heart (pericarditis) or lung (pleuritis or pleurisy).  A blood clot in your lung.  A collapsed lung (pneumothorax). This can develop suddenly on its own (spontaneous pneumothorax) or from trauma to the chest.  Shingles infection (varicella-zoster virus).  Heart attack.  Damage to the bones, muscles, and cartilage that make up your chest wall. This can include:    Bruised bones due to injury.  Strained muscles or cartilage due to frequent or repeated coughing or overwork.  Fracture to one or more ribs.  Sore cartilage due to inflammation (costochondritis).      What increases the risk?  Risk factors for this condition may include:    Activities that increase your risk for trauma or injury to your chest.  Respiratory infections or conditions that cause frequent coughing.  Medical conditions or overeating that can cause heartburn.  Heart disease or family history of heart disease.  Conditions or health behaviors that increase your risk of developing a blood clot.  Having had chicken pox (varicella zoster).    What are the signs or symptoms?  Chest pain can feel like:    Burning or tingling on the surface of your chest or deep in your chest.  Crushing, pressure, aching, or squeezing pain.  Dull or sharp pain that is worse when you move, cough, or take a deep breath.  Pain that is also felt in your back, neck, shoulder, or arm, or pain that spreads to any of these areas.    Your chest pain may come and go, or it may stay constant.    How is this diagnosed?  Lab tests or other studies may be needed to find the cause of your pain. Your health care provider may have you take a test called an ECG (electrocardiogram). An ECG records your heartbeat patterns at the time the test is performed. You may also have other tests, such as:    Transthoracic echocardiogram (TTE). In this test, sound waves are used to create a picture of the heart structures and to look at how blood flows through your heart.  Transesophageal echocardiogram (NATO). This is a more advanced imaging test that takes images from inside your body. It allows your health care provider to see your heart in finer detail.  Cardiac monitoring. This allows your health care provider to monitor your heart rate and rhythm in real time.  Holter monitor. This is a portable device that records your heartbeat and can help to diagnose abnormal heartbeats. It allows your health care provider to track your heart activity for several days, if needed.  Stress tests. These can be done through exercise or by taking medicine that makes your heart beat more quickly.  Blood tests.  Other imaging tests.    How is this treated?  Treatment depends on what is causing your chest pain. Treatment may include:    Medicines. These may include:    Acid blockers for heartburn.  Anti-inflammatory medicine.  Pain medicine for inflammatory conditions.  Antibiotic medicine, if an infection is present.  Medicines to dissolve blood clots.  Medicines to treat coronary artery disease (CAD).    Supportive care for conditions that do not require medicines. This may include:    Resting.  Applying heat or cold packs to injured areas.  Limiting activities until pain decreases.      Follow these instructions at home:  Medicines     If you were prescribed an antibiotic, take it as told by your health care provider. Do not stop taking the antibiotic even if you start to feel better.  Take over-the-counter and prescription medicines only as told by your health care provider.  Lifestyle     Do not use any products that contain nicotine or tobacco, such as cigarettes and e-cigarettes. If you need help quitting, ask your health care provider.  Do not drink alcohol.  ImageMake lifestyle changes as directed by your health care provider. These may include:    Getting regular exercise. Ask your health care provider to suggest some activities that are safe for you.  Eating a heart-healthy diet. A registered dietitian can help you to learn healthy eating options.  Maintaining a healthy weight.  Managing diabetes, if necessary.  Reducing stress, such as with yoga or relaxation techniques.    General instructions     Avoid any activities that bring on chest pain.  If heartburn is the cause for your chest pain, raise (elevate) the head of your bed about 6 inches (15 cm) by putting blocks under the legs. Sleeping with more pillows does not effectively relieve heartburn because it only changes the position of your head.  Keep all follow-up visits as told by your health care provider. This is important. This includes any further testing if your chest pain does not go away.  Contact a health care provider if:  Your chest pain does not go away.  You have a rash with blisters on your chest.  You have a fever.  You have chills.  Get help right away if:  Your chest pain is worse.  You have a cough that gets worse, or you cough up blood.  You have severe pain in your abdomen.  You have severe weakness.  You faint.  You have sudden, unexplained chest discomfort.  You have sudden, unexplained discomfort in your arms, back, neck, or jaw.  You have shortness of breath at any time.  You suddenly start to sweat, or your skin gets clammy.  You feel nauseous or you vomit.  You suddenly feel light-headed or dizzy.  Your heart begins to beat quickly, or it feels like it is skipping beats.  These symptoms may represent a serious problem that is an emergency. Do not wait to see if the symptoms will go away. Get medical help right away. Call your local emergency services (911 in the [place] Do not drive yourself to the hospital.     This information is not intended to replace advice given to you by your health care provider. Make sure you discuss any questions you have with your health care provider.

## 2025-07-27 NOTE — ED PROVIDER NOTE - NSCAREINITIATED _GEN_ER
Critical Care Medicine Attending Note    Unless otherwise indicated below, I saw and evaluated the patient on 09/01/24. I have personally interviewed and examined the patient, reviewed the relevant data in the electronic medical record, participated in the care of the patient, and I concur with the assessment and plan of the separate resident physician except as noted below.    Khang Quinn is a 64 year old male with PMH of HTN, DM, HIV on HAART, GERD, Asthma who presented on 8/26/2024 for Shortness of breath and R groin pain in the setting of Psoas Abscess.    Assessment & Plan:  Pulmonary: Acute Hypoxic Respiratory Failure, Hx of Asthma - Intubated 8/29/24 for increased work of breathing which may be due to sepsis. Extubated to Nasal Cannula on 8/31. Cont Duoneb q4h. Discontinued steroids due to lower suspicion for asthma exacerbation. Wean oxygen for spO2 > 90%.    Cardiovascular: Atrial Fibrillation with RVR, HTN, Elevated BNP - Discontinue amio gtt per Cardiology. TTE with normal LV and RV size and function without pericardial effusion. Hold home antihypertensives. Cont hep gtt due to elevated CHADSVASC. Cardiology following.    Renal/Metabolic: Hypernatremia(resolved), Hypokalemia(resolved), AGMA (resolved), Urinary Retention(resolved) - Gap closed with insulin gtt. Continue to monitor.    GI/Alimentary: Mildly elevated transaminates(resolved), Dilated Loops of large bowel, concern for ileus - Having regular bowel movements. Encourage ambulation.     Heme/Onc:  Normocytic Anemia - Continue to monitor    Endocrine: Euglycemic DKA(resolved), Hx of DM(not insulin dependent), Low TSH - Gap closed with Insulin Gtt, now transitioned to subq insulin. Discontinue Empagliflozin as home medication. Continue scheduled insulin and sliding scale. Endocrinology consult to assist with new insulin requirement.    ID: Recurrent Fevers, Sepsis, Epidural Abscess throughout Lumbar Spine with thecal sac compression,  Leptomeningeal Meningitis, Lumbar Spine Osteomyelitis, Psoas Abscess s/p IR drain growing MSSA, MSSA UTI, MSSA Bacteremia(+8/26, negative since 8/27), HIV on HAART and R foot erythema- Afebrile for 48+ hours. Neurosurgery does not feel surgery is indicated due to lack of neurological symptoms and recommending non-operative management. IR placed drain in Psoas Abscess on 8/27 then 8/30 aspiration of 3 cc purulent fluid from adjacent space. Closely monitor R ankle erythema, low threshold to inspect for joint infection. Discuss with ID regarding utility of LP? Cont IV Oxacillin and Clindamycin. Cont HAART, CD4/CD8 normal. ID following.    Neurologic: No acute issues.     Lines/Drains/Access: External Cisneros   VTE Prophylaxis: Hep gtt  GI Prophylaxis: None  Disposition: MICU    I personally spent a total of 45 minutes in the care of this critically ill patient. Total time excludes any procedures performed.     Patient's condition has high probability for clinically significant or life-threatening deterioration and requires the highest level of physician preparedness to intervene urgently.    Hanna De La Rosa MD  Attending Physician  Pulmonary Disease & Critical Care Medicine   Jeyson Anderson(Attending)

## 2025-07-27 NOTE — ED ADULT NURSE NOTE - OBJECTIVE STATEMENT
Patient presents to the ED complaining of right sided chest pain radiating to his back since last night. Patient states that he was playing poker when the pain started. Reports to drinking and smoking last night. Patient reports history of pulmonary emboli. Patient states that he felt the pain when he bent down to  his razor.

## 2025-07-27 NOTE — ED PROVIDER NOTE - MUSCULOSKELETAL, MLM
Spine appears normal, range of motion is not limited, no muscle or joint tenderness. trace edema legs

## 2025-07-27 NOTE — ED PROVIDER NOTE - CLINICAL SUMMARY MEDICAL DECISION MAKING FREE TEXT BOX
rt cp x 3 d. no fever. increased today. labs/cxr/ekg/cta chest ordered. r/o acs, pe, pneumonia, chf, ptx.  trop 12>11>10, acs unlikely. cta neg for pe, shows symptoms of cirrhosis/ alcohol abuse. labs with hyponatremia, improved after ivf bolus, likely from dehydration/ alcohol use. wbc wnl. no signs infection.  counseled to stop drinking, f/u pmd. prn pain meds. possible pleurisy/ chest wall pain. return precautions discussed

## 2025-07-27 NOTE — ED PROVIDER NOTE - GASTROINTESTINAL, MLM
Abdomen soft, non-tender, no guarding. [0] : 2) Feeling down, depressed, or hopeless: Not at all (0) [No Increased risk of SCA or SCD] : No Increased risk of SCA or SCD    [[id number]] : 0 [[id number]] : 0 [Have you ever fainted, passed out or had an unexplained seizure suddenly and without warning, especially during exercise or in response] : Have you ever fainted, passed out or had an unexplained seizure suddenly and without warning, especially during exercise or in response to sudden loud noises such as doorbells, alarm clocks and ringing telephones? No [Have you ever had exercise-related chest pain or shortness of breath?] : Have you ever had exercise-related chest pain or shortness of breath? No [Has anyone in your immediate family (parents, grandparents, siblings) or other more distant relatives (aunts, uncles, cousins)  of heart] : Has anyone in your immediate family (parents, grandparents, siblings) or other more distant relatives (aunts, uncles, cousins)  of heart problems or had an unexpected sudden death before age 50 (This would include unexpected drownings, unexplained car accidents in which the relative was driving or sudden infant death syndrome.)? No [Are you related to anyone with hypertrophic cardiomyopathy or hypertrophic obstructive cardiomyopathy, Marfan syndrome, arrhythmogenic] : Are you related to anyone with hypertrophic cardiomyopathy or hypertrophic obstructive cardiomyopathy, Marfan syndrome, arrhythmogenic right ventricular cardiomyopathy, long QT syndrome, short QT syndrome, Brugada syndrome or catecholaminergic polymorphic ventricular tachycardia, or anyone younger than 50 years with a pacemaker or implantable defibrillator? No

## 2025-07-27 NOTE — ED PROVIDER NOTE - PATIENT PORTAL LINK FT
You can access the FollowMyHealth Patient Portal offered by [facility] by registering at the following website: [email or web address]. By joining [facility]’s FollowMyHealth portal, you will also be able to view your health information using other applications (apps) compatible with our system.

## 2025-07-27 NOTE — ED ADULT TRIAGE NOTE - CHIEF COMPLAINT QUOTE
Pt arrived to ED c/o chest pain since yesterday. Pt reports this morning he was picking something up from the floor and felt a sharp pain to the R shoulder blade radiating to the R chest. hx of CAD, HTN, HLD, etoh abuse. Pt endorses smoking and alcohol use till 5am today. Pt A&Ox4, ambulatory with steady gait, speaking in clear/full sentences, NAD, EKG in prog. R arm /72, L arm /76

## 2025-07-28 ENCOUNTER — EMERGENCY (EMERGENCY)
Facility: HOSPITAL | Age: 60
LOS: 1 days | End: 2025-07-28
Attending: EMERGENCY MEDICINE | Admitting: EMERGENCY MEDICINE
Payer: MEDICARE

## 2025-07-28 VITALS
SYSTOLIC BLOOD PRESSURE: 157 MMHG | DIASTOLIC BLOOD PRESSURE: 83 MMHG | RESPIRATION RATE: 16 BRPM | TEMPERATURE: 98 F | OXYGEN SATURATION: 96 % | HEART RATE: 81 BPM

## 2025-07-28 VITALS
SYSTOLIC BLOOD PRESSURE: 152 MMHG | OXYGEN SATURATION: 98 % | TEMPERATURE: 100 F | DIASTOLIC BLOOD PRESSURE: 82 MMHG | WEIGHT: 199.96 LBS | HEART RATE: 90 BPM | RESPIRATION RATE: 18 BRPM

## 2025-07-28 DIAGNOSIS — F17.210 NICOTINE DEPENDENCE, CIGARETTES, UNCOMPLICATED: ICD-10-CM

## 2025-07-28 DIAGNOSIS — I10 ESSENTIAL (PRIMARY) HYPERTENSION: ICD-10-CM

## 2025-07-28 DIAGNOSIS — K21.9 GASTRO-ESOPHAGEAL REFLUX DISEASE WITHOUT ESOPHAGITIS: ICD-10-CM

## 2025-07-28 DIAGNOSIS — R06.02 SHORTNESS OF BREATH: ICD-10-CM

## 2025-07-28 DIAGNOSIS — M54.9 DORSALGIA, UNSPECIFIED: ICD-10-CM

## 2025-07-28 DIAGNOSIS — I25.10 ATHEROSCLEROTIC HEART DISEASE OF NATIVE CORONARY ARTERY WITHOUT ANGINA PECTORIS: ICD-10-CM

## 2025-07-28 DIAGNOSIS — Z86.711 PERSONAL HISTORY OF PULMONARY EMBOLISM: ICD-10-CM

## 2025-07-28 DIAGNOSIS — E78.5 HYPERLIPIDEMIA, UNSPECIFIED: ICD-10-CM

## 2025-07-28 DIAGNOSIS — R07.89 OTHER CHEST PAIN: ICD-10-CM

## 2025-07-28 PROCEDURE — 99284 EMERGENCY DEPT VISIT MOD MDM: CPT

## 2025-07-28 PROCEDURE — 99283 EMERGENCY DEPT VISIT LOW MDM: CPT

## 2025-07-28 RX ORDER — CYCLOBENZAPRINE HYDROCHLORIDE 15 MG/1
1 CAPSULE, EXTENDED RELEASE ORAL
Qty: 20 | Refills: 0
Start: 2025-07-28

## 2025-07-28 RX ORDER — CYCLOBENZAPRINE HYDROCHLORIDE 15 MG/1
10 CAPSULE, EXTENDED RELEASE ORAL ONCE
Refills: 0 | Status: COMPLETED | OUTPATIENT
Start: 2025-07-28 | End: 2025-07-28

## 2025-07-28 RX ORDER — IBUPROFEN 200 MG
600 TABLET ORAL ONCE
Refills: 0 | Status: COMPLETED | OUTPATIENT
Start: 2025-07-28 | End: 2025-07-28

## 2025-07-28 RX ADMIN — Medication 600 MILLIGRAM(S): at 16:39

## 2025-07-28 RX ADMIN — CYCLOBENZAPRINE HYDROCHLORIDE 10 MILLIGRAM(S): 15 CAPSULE, EXTENDED RELEASE ORAL at 16:39

## 2025-07-28 NOTE — ED PROVIDER NOTE - NSFOLLOWUPINSTRUCTIONS_ED_ALL_ED_FT
Please follow-up with your primary care doctor in 2 to 3 days.  You can take over-the-counter ibuprofen as needed and as prescribed for pain.  You were also prescribed a muscle relaxer called Flexeril.  Please take it as prescribed.  Return to the emergency department if you develop any concerning symptoms.    Back Pain    Back pain is very common in adults. The cause of back pain is rarely dangerous and the pain often gets better over time. The cause of your back pain may not be known and may include strain of muscles or ligaments, degeneration of the spinal disks, or arthritis. Occasionally the pain may radiate down your leg(s). Over-the-counter medicines to reduce pain and inflammation are often the most helpful. Stretching and remaining active frequently helps the healing process.     SEEK IMMEDIATE MEDICAL CARE IF YOU HAVE ANY OF THE FOLLOWING SYMPTOMS: bowel or bladder control problems, unusual weakness or numbness in your arms or legs, nausea or vomiting, abdominal pain, fever, dizziness/lightheadedness.    Chest Pain    Chest pain can be caused by many different conditions which may or may not be dangerous. Causes include heartburn, lung infections, heart attack, blood clot in lungs, skin infections, strain or damage to muscle, cartilage, or bones, etc. In addition to a history and physical examination, an electrocardiogram (ECG) or other lab tests may have been performed to determine the cause of your chest pain. Follow up with your primary care provider or with a cardiologist as instructed.     SEEK IMMEDIATE MEDICAL CARE IF YOU HAVE ANY OF THE FOLLOWING SYMPTOMS: worsening chest pain, coughing up blood, unexplained back/neck/jaw pain, severe abdominal pain, dizziness or lightheadedness, fainting, shortness of breath, sweaty or clammy skin, vomiting, or racing heart beat. These symptoms may represent a serious problem that is an emergency. Do not wait to see if the symptoms will go away. Get medical help right away. Call 911 and do not drive yourself to the hospital.

## 2025-07-28 NOTE — ED PROVIDER NOTE - CPE EDP CARDIAC NORM

## 2025-07-28 NOTE — ED PROVIDER NOTE - PATIENT PORTAL LINK FT
You can access the FollowMyHealth Patient Portal offered by [facility] by registering at the following website: [email or web address]. By joining [facility]’s FollowMyHealth portal, you will also be able to view your health information using other applications (apps) compatible with our system.

## 2025-07-28 NOTE — ED ADULT NURSE NOTE - OBJECTIVE STATEMENT
59 y.o male A&Ox4 ambulatory w/ steady gait presents to ED w/ c/o chest and upper back pain x2 days. Pt seen in [facility] ED yesterday for similar complaint, reports worsening pain today. Pt denies f/c, SOB. Pt NAD, speaking in clear, full sentences, respirations even and unlabored.

## 2025-07-28 NOTE — ED ADULT TRIAGE NOTE - MEANS OF ARRIVAL
CC: follow up Retinal detachment right eye ; discharge from right eye    HPI: right eye: status post 23g Pars plana vitrectomy (PPV)/ endolaser/ intraocular lens removal and capsular tension ring removal right eye 8.15.17. Patient with chronic Retinal detachment  And dislocated intraocular lens   Intra-op findings: vitreous hemorrhage,total Retinal detachment with the inferior retina dragged anteriorly and superiorly over the superior retina where the anterior retina was attached to each other. The choroid was visualized inferiorly and the inferior retina was avulsed from the optic nerve head and only a small part of the superior nasal retina was attached. At the optic nerve head appears to be an old vitreoretinal stalk and funnel Retinal detachment (old PHPV?). The retinal detachment was inoperable.     Interim: mother states Jerry's eyes have been bother him and are red; discharge from right eye over last 2 weeks. Not using any drops.     optos image 08/07/19  Left eye with posterior staphyloma and temporal peripheral Retinal pigment epithelium changes - possible flat choroidal lesion and peripheral areas of white without pressure     Ultrasound:   Right eye: old Retinal detachment   Left eye: posterior staphyloma and few vitreous debri; no Retinal detachment      Assessment     1. History of old Retinal detachment right eye   -The retinal detachment was inoperable.   - cornea decompensation  - good intraocular pressure  - per mom, eye occasional with redness and pain  Recommend artificial tears as needed     2- monocular patient. Refractive error left eye   The patient was told to wear polycarbonte glasses at all times to protect the good eye.  he expressed understanding.    Mother states jerry did not tolerate prescription. Currently not using glasses. Occasional goggles    3. 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 conjuntivitis right eye   maxitrol oint twice a day x  5 days    Plan:    The patient's mom was told to wear polycarbonte glasses at all times to protect the good eye. She expressed that he is unable to do so and that he would remove the glasses and not allow them to be put on.     Recheck in 8 months with prescription with optos    ~~~~~~~~~~~~~~~~~~~~~~~~~~~~~~~~~~   Complete documentation of historical and exam elements from today's encounter can be found in the full encounter summary report (not reduplicated in this progress note).  I personally obtained the chief complaint(s) and history of present illness.  I confirmed and edited as necessary the review of systems, past medical/surgical history, family history, social history, and examination findings as documented by others; and I examined the patient myself.  I personally reviewed the relevant tests, images, and reports as documented above.  I formulated and edited as necessary the assessment and plan and discussed the findings and management plan with the patient and family    Laney Bardales MD   of Ophthalmology.  Retina Service   Department of Ophthalmology and Visual Neurosciences   [facility]  Phone: [phone]   Fax: [phone]          ambulatory

## 2025-07-28 NOTE — ED ADULT TRIAGE NOTE - WEIGHT IN LBS
Angel Haynes ()  Internal Medicine  [address] [place] [address], [facility] [address]  [place] [address]  Phone: [phone]  Fax: [phone]  Follow Up Time:     Praful Coronado)  Critical Care Medicine; Internal Medicine; Pulmonary Disease  [address], [address]  [place], [place] [id number]  Phone: [phone]  Fax: [phone]  Follow Up Time: 199.9

## 2025-07-28 NOTE — ED PROVIDER NOTE - OBJECTIVE STATEMENT
58 yo M with PMH of past medical history of hypertension on lisinopril and diltiazem, HLD on atorvastatin, history of CAD with balloon angioplasty, GERD on omeprazole, history of PE and was taken off anticoagulation secondary to splenic bleed and is currently not on any AC including aspirin, daily smoker half pack per day, and heavy alcohol use, here with right sided chest pain for past 3 days. Reports sharp stabbing intermittent pain that became more constant after bending down about 3 hours ago. Aggravated by movement, deep breath. Mild associated sob. No fever/chills, cough. Was up all night smoking/ drinking. Denies other drug use. 60 yo M with PMH of past medical history of hypertension [lisinopril and diltiazem], HLD on atorvastatin, history of CAD with balloon angioplasty, GERD on omeprazole, history of PE and was taken off anticoagulation secondary to splenic bleed [currently not on any AC including aspirin], daily smoker [half pack per day], heavy alcohol use presents with back pain and spasms with right sided chest pain for the past 3 days. Reports sharp stabbing intermittent pain that became more constant after bending down. Pain is aggravated with movement and deep breaths and is associated with mild shortness of breath. No fever/chills, cough. Patient was seen in the ED yesterday for the same with full w/up including bloodwork, ECG and CTA chest which were all unremarkable for acute process, Denies any other drug use. 60 yo M with PMH of past medical history of hypertension [lisinopril and diltiazem], HLD on atorvastatin, history of CAD with balloon angioplasty, GERD on omeprazole, history of PE and was taken off anticoagulation secondary to splenic bleed [currently not on any AC including aspirin], daily smoker [half pack per day], heavy alcohol use presents with back pain and spasms with right sided chest pain for the past 3 days. Reports sharp stabbing intermittent pain that became more constant after bending down. Pain is aggravated with movement and deep breaths and is associated with mild shortness of breath. No fever/chills, cough, trauma or injury. Denies any other drug use. Patient was seen in the ED yesterday for the same complaints with full w/up including bloodwork, ECG and CTA chest which were all unremarkable for acute process, CTA chest was negative for PE and patient had 3 troponins done (13>12>10). Patient states that he is having continued pain especially in his back. 58 yo M with PMH of past medical history of hypertension [lisinopril and diltiazem], HLD on atorvastatin, history of CAD with balloon angioplasty, GERD on omeprazole, history of PE and was taken off anticoagulation secondary to splenic bleed [currently not on any AC including aspirin], daily smoker [half pack per day], heavy alcohol use presents with back pain and spasms with right sided chest pain for the past 3 days. Reports sharp stabbing intermittent pain that became more constant after bending down. Pain is aggravated with movement and deep breaths and is associated with mild shortness of breath. No fever/chills, cough, abd pain, trauma or injury. Denies any other drug use. Patient was seen in the ED yesterday for the same complaints with full w/up including bloodwork, ECG and CTA chest which were all unremarkable for acute process, CTA chest was negative for PE and patient had 3 troponins done (13>12>10). Patient states that he is having continued pain especially in his back.

## 2025-07-28 NOTE — ED PROVIDER NOTE - CLINICAL SUMMARY MEDICAL DECISION MAKING FREE TEXT BOX
58 yo M with PMH of past medical history of hypertension [lisinopril and diltiazem], HLD on atorvastatin, history of CAD with balloon angioplasty, GERD on omeprazole, history of PE and was taken off anticoagulation secondary to splenic bleed [currently not on any AC including aspirin], daily smoker [half pack per day], heavy alcohol use presents with back pain and spasms with right sided chest pain for the past 3 days. Reports sharp stabbing intermittent pain that became more constant after bending down. Pain is aggravated with movement and deep breaths and is associated with mild shortness of breath. No fever/chills, cough, trauma or injury. Denies any other drug use. Patient was seen in the ED yesterday for the same complaints with full w/up including bloodwork, ECG and CTA chest which were all unremarkable for acute process, CTA chest was negative for PE and patient had 3 troponins done (13>12>10). Patient states that he is having continued pain especially in his back. 58 yo M with PMH of past medical history of hypertension [lisinopril and diltiazem], HLD on atorvastatin, history of CAD with balloon angioplasty, GERD on omeprazole, history of PE and was taken off anticoagulation secondary to splenic bleed [currently not on any AC including aspirin], daily smoker [half pack per day], heavy alcohol use presents with back pain and spasms with right sided chest pain for the past 3 days. Reports sharp stabbing intermittent pain that became more constant after bending down. Pain is aggravated with movement and deep breaths and is associated with mild shortness of breath. No fever/chills, cough, trauma or injury. Denies any other drug use. Patient was seen in the ED yesterday for the same complaints with full w/up including bloodwork, ECG and CTA chest which were all unremarkable for acute process, CTA chest was negative for PE and patient had 3 troponins done (12>11>10). Patient states that he is having continued pain especially in his back.    ED course: VS noted. Pt mildly hypertensive and rest of VS unremarkable. Labs, studies and ECG noted from yesterday. Patient given 60 yo M with PMH of past medical history of hypertension [lisinopril and diltiazem], HLD on atorvastatin, history of CAD with balloon angioplasty, GERD on omeprazole, history of PE and was taken off anticoagulation secondary to splenic bleed [currently not on any AC including aspirin], daily smoker [half pack per day], heavy alcohol use presents with back pain and spasms with right sided chest pain for the past 3 days. Reports sharp stabbing intermittent pain that became more constant after bending down. Pain is aggravated with movement and deep breaths and is associated with mild shortness of breath. No fever/chills, cough, abd pain, trauma or injury. Denies any other drug use. Patient was seen in the ED yesterday for the same complaints with full w/up including bloodwork, ECG and CTA chest which were all unremarkable for acute process, CTA chest was negative for PE and patient had 3 troponins done (12>11>10). Patient states that he is having continued pain especially in his back.    ED course: VS noted. Pt mildly hypertensive and rest of VS unremarkable. Patient non-toxic appearing. Exam is non-focal. Labs, studies and ECG noted from yesterday and unremarkable for acute process. Patient given Ibuprofen and Flexeril and feeling better. Symptoms likely secondary to MSK etiology. Non-toxic appearing and stable for discharge. Rx given for Flexeril and advised OTC Ibuprofen prn pain. To follow up outpatient. Strict return precautions given.

## 2025-07-29 DIAGNOSIS — E78.5 HYPERLIPIDEMIA, UNSPECIFIED: ICD-10-CM

## 2025-07-29 DIAGNOSIS — R07.89 OTHER CHEST PAIN: ICD-10-CM

## 2025-07-29 DIAGNOSIS — R06.02 SHORTNESS OF BREATH: ICD-10-CM

## 2025-07-29 DIAGNOSIS — K21.9 GASTRO-ESOPHAGEAL REFLUX DISEASE WITHOUT ESOPHAGITIS: ICD-10-CM

## 2025-07-29 DIAGNOSIS — F17.200 NICOTINE DEPENDENCE, UNSPECIFIED, UNCOMPLICATED: ICD-10-CM

## 2025-07-29 DIAGNOSIS — Z86.711 PERSONAL HISTORY OF PULMONARY EMBOLISM: ICD-10-CM

## 2025-07-29 DIAGNOSIS — I10 ESSENTIAL (PRIMARY) HYPERTENSION: ICD-10-CM

## 2025-07-29 DIAGNOSIS — I25.10 ATHEROSCLEROTIC HEART DISEASE OF NATIVE CORONARY ARTERY WITHOUT ANGINA PECTORIS: ICD-10-CM

## 2025-07-29 DIAGNOSIS — Z95.5 PRESENCE OF CORONARY ANGIOPLASTY IMPLANT AND GRAFT: ICD-10-CM

## 2025-08-13 ENCOUNTER — EMERGENCY (EMERGENCY)
Facility: HOSPITAL | Age: 60
LOS: 1 days | End: 2025-08-13
Admitting: STUDENT IN AN ORGANIZED HEALTH CARE EDUCATION/TRAINING PROGRAM
Payer: MEDICARE

## 2025-08-13 VITALS
SYSTOLIC BLOOD PRESSURE: 148 MMHG | HEART RATE: 90 BPM | OXYGEN SATURATION: 99 % | DIASTOLIC BLOOD PRESSURE: 79 MMHG | TEMPERATURE: 99 F | RESPIRATION RATE: 18 BRPM

## 2025-08-13 PROCEDURE — 99283 EMERGENCY DEPT VISIT LOW MDM: CPT

## 2025-08-13 RX ORDER — AMOXICILLIN AND CLAVULANATE POTASSIUM 500; 125 MG/1; MG/1
1 TABLET, FILM COATED ORAL
Qty: 14 | Refills: 0
Start: 2025-08-13 | End: 2025-08-19

## 2025-08-15 DIAGNOSIS — R22.0 LOCALIZED SWELLING, MASS AND LUMP, HEAD: ICD-10-CM

## 2025-08-15 DIAGNOSIS — E78.5 HYPERLIPIDEMIA, UNSPECIFIED: ICD-10-CM

## 2025-08-15 DIAGNOSIS — I10 ESSENTIAL (PRIMARY) HYPERTENSION: ICD-10-CM

## 2025-08-15 DIAGNOSIS — I25.10 ATHEROSCLEROTIC HEART DISEASE OF NATIVE CORONARY ARTERY WITHOUT ANGINA PECTORIS: ICD-10-CM
